# Patient Record
Sex: FEMALE | Race: BLACK OR AFRICAN AMERICAN | Employment: OTHER | RURAL
[De-identification: names, ages, dates, MRNs, and addresses within clinical notes are randomized per-mention and may not be internally consistent; named-entity substitution may affect disease eponyms.]

---

## 2017-02-02 ENCOUNTER — OFFICE VISIT (OUTPATIENT)
Dept: FAMILY MEDICINE CLINIC | Age: 67
End: 2017-02-02

## 2017-02-02 VITALS
OXYGEN SATURATION: 96 % | DIASTOLIC BLOOD PRESSURE: 68 MMHG | SYSTOLIC BLOOD PRESSURE: 151 MMHG | HEART RATE: 95 BPM | TEMPERATURE: 102.9 F | HEIGHT: 61 IN | RESPIRATION RATE: 16 BRPM | WEIGHT: 143 LBS | BODY MASS INDEX: 27 KG/M2

## 2017-02-02 DIAGNOSIS — R50.9 ACUTE FEBRILE ILLNESS: Primary | ICD-10-CM

## 2017-02-02 DIAGNOSIS — R52 BODY ACHES: ICD-10-CM

## 2017-02-02 DIAGNOSIS — I10 ESSENTIAL HYPERTENSION: ICD-10-CM

## 2017-02-02 DIAGNOSIS — G40.909 SEIZURE DISORDER (HCC): ICD-10-CM

## 2017-02-02 LAB
QUICKVUE INFLUENZA TEST: NEGATIVE
VALID INTERNAL CONTROL?: YES

## 2017-02-02 RX ORDER — OSELTAMIVIR PHOSPHATE 75 MG/1
75 CAPSULE ORAL 2 TIMES DAILY
Qty: 10 CAP | Refills: 0 | Status: SHIPPED | OUTPATIENT
Start: 2017-02-02 | End: 2017-02-07

## 2017-02-02 NOTE — PATIENT INSTRUCTIONS
Learning About Fever  What is a fever? A fever is a high body temperature. It's one way your body fights being sick. A fever shows that the body is responding to infection or other illnesses, both minor and severe. A fever is a symptom, not an illness by itself. A fever can be a sign that you are ill, but most fevers are not caused by a serious problem. You may have a fever with a minor illness, such as a cold. But sometimes a very serious infection may cause little or no fever. It is important to look at other symptoms, other conditions you have, and how you feel in general. In children, notice how they act and see what symptoms they complain of. What is a normal body temperature? A normal body temperature is about 98. 6ºF. Some people have a normal temperature that is a little higher or a little lower than this. Your temperature may be a little lower in the morning than it is later in the day. It may go up during hot weather or when you exercise, wear heavy clothes, or take a hot bath. Your temperature may also be different depending on how you take it. A temperature taken in the mouth (oral) or under the arm may be a little lower than your core temperature (rectal). What is a fever temperature? A core temperature of 100.4°F or above is considered a fever. What can cause a fever? A fever may be caused by:  · Infections. This is the most common cause of a fever. Examples of infections that can cause a fever include the flu, a kidney infection, or pneumonia. · Some medicines. · Severe trauma or injury, such as a heart attack, stroke, heatstroke, or burns. · Other medical conditions, such as arthritis and some cancers. How can you treat a fever at home? · Ask your doctor if you can take an over-the-counter pain medicine, such as acetaminophen (Tylenol), ibuprofen (Advil, Motrin), or naproxen (Aleve). Be safe with medicines. Read and follow all instructions on the label.   · To prevent dehydration, drink plenty of fluids. Choose water and other caffeine-free clear liquids until you feel better. If you have kidney, heart, or liver disease and have to limit fluids, talk with your doctor before you increase the amount of fluids you drink. Follow-up care is a key part of your treatment and safety. Be sure to make and go to all appointments, and call your doctor if you are having problems. It's also a good idea to know your test results and keep a list of the medicines you take. Where can you learn more? Go to http://caprice-carlos.info/. Enter P297 in the search box to learn more about \"Learning About Fever. \"  Current as of: May 27, 2016  Content Version: 11.1  © 7461-1704 [employer]. Care instructions adapted under license by [employer] (which disclaims liability or warranty for this information). If you have questions about a medical condition or this instruction, always ask your healthcare professional. [address] any warranty or liability for your use of this information.

## 2017-02-02 NOTE — PROGRESS NOTES
Patient: Nadia Gillette MRN: [id number]  SSN: [id number]    YOB: 1950  Age: 74 y.o. Sex: female      Chief Complaint   Patient presents with    Generalized Body Aches     started 1/31/2017, worse 2/1/2017    Fever     Nadia Gillette is a 74 y.o. female presents with complaints of myalgias, headache and fever for 2 days. There has been no nausea and no vomiting . she has not had  congestion and swollen glands. Symptoms are moderate. Patient is drinking plenty of fluids. There is not a hx of asthma. There is not a hx of allergic rhinitis. There is not a hx of tobacco use. There have not been contacts with similar infections. Patient with hx of HTN and seizure disorder    BP Readings from Last 3 Encounters:   02/02/17 151/68   11/02/16 174/80   05/26/16 139/63     Wt Readings from Last 3 Encounters:   02/02/17 143 lb (64.9 kg)   11/02/16 141 lb 12.8 oz (64.3 kg)   05/26/16 143 lb 9.6 oz (65.1 kg)     Body mass index is 27.02 kg/(m^2). Medications:     Current Outpatient Prescriptions   Medication Sig    oseltamivir (TAMIFLU) 75 mg capsule Take 1 Cap by mouth two (2) times a day for 5 days.  hydroCHLOROthiazide (HYDRODIURIL) 12.5 mg tablet TAKE ONE TABLET BY MOUTH ONCE DAILY    /HYPROMELLOSE/GLYCERIN (EYE DROP TEARS OP) Apply  to eye.  aspirin (ASPIRIN) 325 mg tablet Take 1 Tab by mouth daily.  phenytoin ER (DILANTIN ER) 100 mg ER capsule Take 100 mg by mouth daily. Take 3 capsules by mouth at bedtime     No current facility-administered medications for this visit. Problem List:     Patient Active Problem List    Diagnosis Date Noted    Essential hypertension 11/02/2016    Anemia 11/02/2016    Seizure disorder ([facility] [address] 06/21/2013       Medical History:     Past Medical History   Diagnosis Date    Essential hypertension 11/2/2016    Seizure ([facility] [address]     Seizures ([facility] [address]        Allergies:   No Known Allergies    Surgical History:   History reviewed.  No pertinent past surgical history. Social History:     Social History     Social History    Marital status:      Spouse name: N/A    Number of children: N/A    Years of education: N/A     Social History Main Topics    Smoking status: Never Smoker    Smokeless tobacco: Never Used    Alcohol use No    Drug use: No    Sexual activity: Yes     Partners: Male     Other Topics Concern    None     Social History Narrative       Review of Symptoms:  Constitutional: c/o malaise, fever   Skin: Negative for rash or lesion  Head: Negative for facial swelling or tenderness  Eyes: Negative for redness or discharge  Ears: Negative for otalgia or decreased hearing  Nose: c/o nasal congestion, sinus pressure  Neck: c/o sore throat, denies lymphadenopathy   Cardiovascular: Negative for chest pain or palpitations  Respiratory: c/o non-productive cough, denies wheezing or SOB  Gastrointestinal: Negative for nausea or abdominal pain  MS: c/o body aches  Neurologic: c/o headache, Negative for dizziness, paresthesia      Visit Vitals    /68    Pulse 95    Temp (!) 102.9 °F (39.4 °C) (Oral)    Resp 16    Ht 5' 1\" (1.549 m)    Wt 143 lb (64.9 kg)    SpO2 96%    BMI 27.02 kg/m2       Physical Examination:  General: Well developed, well nourished, appears fatigued  Skin: Warm and dry sans rash or lesion  Head: Normocephalic, atraumatic  Eyes: Sclera clear, EOMI  Nose: mucosal edema with rhinorrhea  Oropharynx: posterior erythema, no exudate   Neck: Normal range of motion, no lymphadenopathy  Cardiovascular: normal S1, S2, regular rate and rhythm  Respiratory: Clear to auscultation bilaterally with symmetrical, unlabored effort  Abdomen: Soft, Normal BS  Extremities: Full range of motion  Neurologic: Active, alert and oriented      Jonelle Webb was seen today for generalized body aches and fever.     Diagnoses and all orders for this visit:    Acute febrile illness  -     AMB POC RAPID INFLUENZA TEST  -     oseltamivir (TAMIFLU) 75 mg capsule; Take 1 Cap by mouth two (2) times a day for 5 days. -     CBC WITH AUTOMATED DIFF  -     INFLUENZA VIRUS A+B IGM AB    Body aches  -     AMB POC RAPID INFLUENZA TEST  -     CBC WITH AUTOMATED DIFF    Essential hypertension    Seizure disorder (HCC)      Alternate ibuprofen and acetaminophen to control fever  Symptomatic therapy suggested: rest, increase fluids, gargle prn for sore throat and call prn if symptoms persist or worsen. I have discussed the diagnosis with the patient and the intended plan as seen in the above orders. The patient expresses understanding and agreement with our plan of care. All of the patient's questions were answered to apparent satisfaction. The patient has received an after-visit summary. The patient knows to call our office if there are any questions or concerns regarding diagnosis and treatment plans. I have discussed medication side effects and warnings with the patient as well. Follow-up Disposition:  Return if symptoms worsen or fail to improve.

## 2017-02-02 NOTE — MR AVS SNAPSHOT
Visit Information Date & Time Provider Department Dept. Phone Encounter #  
 2/2/2017  1:40 PM Jorge Luis Haynes MD [address] [place] [address] [id number] Upcoming Health Maintenance Date Due DTaP/Tdap/Td series (1 - Tdap) 5/27/1969 GLAUCOMA SCREENING Q2Y 5/27/2013 INFLUENZA AGE 9 TO ADULT 8/1/2016 BREAST CANCER SCRN MAMMOGRAM 4/1/2017 Pneumococcal 65+ Low/Medium Risk (2 of 2 - PPSV23) 5/26/2017 MEDICARE YEARLY EXAM 5/27/2017 COLONOSCOPY 3/25/2025 Allergies as of 2/2/2017  Review Complete On: 2/2/2017 By: Kely Portillo LPN No Known Allergies Current Immunizations  Never Reviewed No immunizations on file. Not reviewed this visit You Were Diagnosed With   
  
 Codes Comments Acute febrile illness    -  Primary ICD-10-CM: R50.9 ICD-9-CM: 780.60 Body aches     ICD-10-CM: R52 ICD-9-CM: 780.96 Vitals BP Pulse Temp Resp Height(growth percentile) Weight(growth percentile) 151/68 95 (!) 102.9 °F (39.4 °C) (Oral) 16 5' 1\" (1.549 m) 143 lb (64.9 kg) SpO2 BMI OB Status Smoking Status 96% 27.02 kg/m2 Postmenopausal Never Smoker Vitals History BMI and BSA Data Body Mass Index Body Surface Area  
 27.02 kg/m 2 1.67 m 2 Preferred Pharmacy Pharmacy Name Phone [facility] [address] [address] [phone] Your Updated Medication List  
  
   
This list is accurate as of: 2/2/17  1:59 PM.  Always use your most recent med list.  
  
  
  
  
 aspirin 325 mg tablet Commonly known as:  ASPIRIN Take 1 Tab by mouth daily. EYE DROP TEARS OP Apply  to eye.  
  
 hydroCHLOROthiazide 12.5 mg tablet Commonly known as:  HYDRODIURIL  
TAKE ONE TABLET BY MOUTH ONCE DAILY  
  
 oseltamivir 75 mg capsule Commonly known as:  TAMIFLU Take 1 Cap by mouth two (2) times a day for 5 days. phenytoin  mg ER capsule Commonly known as:  DILANTIN ER Take 100 mg by mouth daily. Take 3 capsules by mouth at bedtime Prescriptions Sent to Pharmacy Refills  
 oseltamivir (TAMIFLU) 75 mg capsule 0 Sig: Take 1 Cap by mouth two (2) times a day for 5 days. Class: Normal  
 Pharmacy: [id number] #: [phone] Route: Oral  
  
We Performed the Following AMB POC RAPID INFLUENZA TEST [53956 CPT(R)] CBC WITH AUTOMATED DIFF [03068 CPT(R)] INFLUENZA VIRUS A+B IGM AB [51608 CPT(R)] Patient Instructions Learning About Fever What is a fever? A fever is a high body temperature. It's one way your body fights being sick. A fever shows that the body is responding to infection or other illnesses, both minor and severe. A fever is a symptom, not an illness by itself. A fever can be a sign that you are ill, but most fevers are not caused by a serious problem. You may have a fever with a minor illness, such as a cold. But sometimes a very serious infection may cause little or no fever. It is important to look at other symptoms, other conditions you have, and how you feel in general. In children, notice how they act and see what symptoms they complain of. What is a normal body temperature? A normal body temperature is about 98. 6ºF. Some people have a normal temperature that is a little higher or a little lower than this. Your temperature may be a little lower in the morning than it is later in the day. It may go up during hot weather or when you exercise, wear heavy clothes, or take a hot bath. Your temperature may also be different depending on how you take it. A temperature taken in the mouth (oral) or under the arm may be a little lower than your core temperature (rectal). What is a fever temperature? A core temperature of 100.4°F or above is considered a fever. What can cause a fever? A fever may be caused by: · Infections. This is the most common cause of a fever. Examples of infections that can cause a fever include the flu, a kidney infection, or pneumonia. · Some medicines. · Severe trauma or injury, such as a heart attack, stroke, heatstroke, or burns. · Other medical conditions, such as arthritis and some cancers. How can you treat a fever at home? · Ask your doctor if you can take an over-the-counter pain medicine, such as acetaminophen (Tylenol), ibuprofen (Advil, Motrin), or naproxen (Aleve). Be safe with medicines. Read and follow all instructions on the label. · To prevent dehydration, drink plenty of fluids. Choose water and other caffeine-free clear liquids until you feel better. If you have kidney, heart, or liver disease and have to limit fluids, talk with your doctor before you increase the amount of fluids you drink. Follow-up care is a key part of your treatment and safety. Be sure to make and go to all appointments, and call your doctor if you are having problems. It's also a good idea to know your test results and keep a list of the medicines you take. Where can you learn more? Go to http://caprice-carlos.info/. Enter R143 in the search box to learn more about \"Learning About Fever. \" Current as of: May 27, 2016 Content Version: 11.1 © 4997-9756 [employer]. Care instructions adapted under license by [employer] (which disclaims liability or warranty for this information). If you have questions about a medical condition or this instruction, always ask your healthcare professional. [place] [address] any warranty or liability for your use of this information. Introducing [facility]! Romayne Duster introduces [employer] patient portal. Now you can access parts of your medical record, email your doctor's office, and request medication refills online. 1. In your internet browser, go to [email or web address] 2. Click on the First Time User? Click Here link in the Sign In box. You will see the New Member Sign Up page. 3. Enter your Embrace+ Access Code exactly as it appears below. You will not need to use this code after youve completed the sign-up process. If you do not sign up before the expiration date, you must request a new code. · Embrace+ Access Code: [email or web address] Expires: 5/3/2017 12:32 PM 
 
4. Enter the last four digits of your Social Security Number (xxxx) and Date of Birth (mm/dd/yyyy) as indicated and click Submit. You will be taken to the next sign-up page. 5. Create a Embrace+ ID. This will be your Embrace+ login ID and cannot be changed, so think of one that is secure and easy to remember. 6. Create a Embrace+ password. You can change your password at any time. 7. Enter your Password Reset Question and Answer. This can be used at a later time if you forget your password. 8. Enter your e-mail address. You will receive e-mail notification when new information is available in [address] 9. Click Sign Up. You can now view and download portions of your medical record. 10. Click the Download Summary menu link to download a portable copy of your medical information. If you have questions, please visit the Frequently Asked Questions section of the Embrace+ website. Remember, Embrace+ is NOT to be used for urgent needs. For medical emergencies, dial 911. Now available from your iPhone and Android! Please provide this summary of care documentation to your next provider. Your primary care clinician is listed as Dai Montoya. If you have any questions after today's visit, please call [phone].

## 2017-02-02 NOTE — PROGRESS NOTES
Reviewed record in preparation for visit and have necessary documentation  Pt did not bring medication to office visit for review  Opportunity was given for questions  Goals that were addressed and/or need to be completed after this appointment include   Health Maintenance Due   Topic Date Due    DTaP/Tdap/Td series (1 - Tdap) 05/27/1969    GLAUCOMA SCREENING Q2Y  05/27/2013    INFLUENZA AGE 9 TO ADULT  08/01/2016    BREAST CANCER SCRN MAMMOGRAM  04/01/2017

## 2017-05-06 LAB
BASOPHILS # BLD AUTO: 0 X10E3/UL (ref 0–0.2)
BASOPHILS NFR BLD AUTO: 1 %
EOSINOPHIL # BLD AUTO: 0.1 X10E3/UL (ref 0–0.4)
EOSINOPHIL NFR BLD AUTO: 4 %
ERYTHROCYTE [DISTWIDTH] IN BLOOD BY AUTOMATED COUNT: 15.2 % (ref 12.3–15.4)
HCT VFR BLD AUTO: 35.7 % (ref 34–46.6)
HGB BLD-MCNC: 11.6 G/DL (ref 11.1–15.9)
IMM GRANULOCYTES # BLD: 0 X10E3/UL (ref 0–0.1)
IMM GRANULOCYTES NFR BLD: 0 %
LYMPHOCYTES # BLD AUTO: 1.1 X10E3/UL (ref 0.7–3.1)
LYMPHOCYTES NFR BLD AUTO: 33 %
MCH RBC QN AUTO: 29.4 PG (ref 26.6–33)
MCHC RBC AUTO-ENTMCNC: 32.5 G/DL (ref 31.5–35.7)
MCV RBC AUTO: 91 FL (ref 79–97)
MONOCYTES # BLD AUTO: 0.3 X10E3/UL (ref 0.1–0.9)
MONOCYTES NFR BLD AUTO: 10 %
NEUTROPHILS # BLD AUTO: 1.8 X10E3/UL (ref 1.4–7)
NEUTROPHILS NFR BLD AUTO: 52 %
PLATELET # BLD AUTO: 328 X10E3/UL (ref 150–379)
RBC # BLD AUTO: 3.94 X10E6/UL (ref 3.77–5.28)
WBC # BLD AUTO: 3.4 X10E3/UL (ref 3.4–10.8)

## 2017-06-09 DIAGNOSIS — I10 ESSENTIAL HYPERTENSION WITH GOAL BLOOD PRESSURE LESS THAN 130/80: ICD-10-CM

## 2017-06-11 RX ORDER — HYDROCHLOROTHIAZIDE 12.5 MG/1
TABLET ORAL
Qty: 90 TAB | Refills: 0 | Status: SHIPPED | OUTPATIENT
Start: 2017-06-11 | End: 2017-06-27 | Stop reason: SDUPTHER

## 2017-06-26 ENCOUNTER — TELEPHONE (OUTPATIENT)
Dept: FAMILY MEDICINE CLINIC | Age: 67
End: 2017-06-26

## 2017-06-26 NOTE — LETTER
NOTIFICATION RETURN TO WORK / SCHOOL 
 
6/26/2017 9:09 AM 
 
Ms. Ami Rdz [address] [id number] Here are your last lab results. Lab Results Component Value Date/Time WBC 3.4 05/05/2017 09:43 AM  
HGB 11.6 05/05/2017 09:43 AM  
HCT 35.7 05/05/2017 09:43 AM  
PLATELET 635 15/98/7760 09:43 AM  
MCV 91 05/05/2017 09:43 AM  
 
 
 
If there are questions or concerns please have the patient contact our office. Sincerely, Claudette Bellis, MD

## 2017-06-27 ENCOUNTER — OFFICE VISIT (OUTPATIENT)
Dept: FAMILY MEDICINE CLINIC | Age: 67
End: 2017-06-27

## 2017-06-27 VITALS
DIASTOLIC BLOOD PRESSURE: 67 MMHG | HEIGHT: 61 IN | OXYGEN SATURATION: 97 % | BODY MASS INDEX: 26.58 KG/M2 | SYSTOLIC BLOOD PRESSURE: 155 MMHG | HEART RATE: 69 BPM | RESPIRATION RATE: 20 BRPM | WEIGHT: 140.8 LBS | TEMPERATURE: 97.2 F

## 2017-06-27 DIAGNOSIS — I10 ESSENTIAL HYPERTENSION WITH GOAL BLOOD PRESSURE LESS THAN 130/80: Primary | ICD-10-CM

## 2017-06-27 DIAGNOSIS — Z00.00 ROUTINE GENERAL MEDICAL EXAMINATION AT A HEALTH CARE FACILITY: ICD-10-CM

## 2017-06-27 DIAGNOSIS — G40.909 SEIZURE DISORDER (HCC): ICD-10-CM

## 2017-06-27 DIAGNOSIS — Z13.39 SCREENING FOR ALCOHOLISM: ICD-10-CM

## 2017-06-27 DIAGNOSIS — Z13.31 SCREENING FOR DEPRESSION: ICD-10-CM

## 2017-06-27 DIAGNOSIS — Z12.31 ENCOUNTER FOR SCREENING MAMMOGRAM FOR BREAST CANCER: ICD-10-CM

## 2017-06-27 RX ORDER — PHENYTOIN SODIUM 100 MG/1
CAPSULE, EXTENDED RELEASE ORAL
Qty: 90 CAP | Refills: 5 | Status: SHIPPED | OUTPATIENT
Start: 2017-06-27 | End: 2018-04-23 | Stop reason: SDUPTHER

## 2017-06-27 RX ORDER — HYDROCHLOROTHIAZIDE 12.5 MG/1
TABLET ORAL
Qty: 90 TAB | Refills: 1 | Status: SHIPPED | OUTPATIENT
Start: 2017-06-27 | End: 2018-03-05 | Stop reason: SDUPTHER

## 2017-06-27 NOTE — MR AVS SNAPSHOT
Visit Information Date & Time Provider Department Dept. Phone Encounter #  
 6/27/2017  2:40 PM Bryan Kelsey MD Sonya Navas [id number] Upcoming Health Maintenance Date Due DTaP/Tdap/Td series (1 - Tdap) 5/27/1969 GLAUCOMA SCREENING Q2Y 5/27/2013 BREAST CANCER SCRN MAMMOGRAM 4/1/2017 Pneumococcal 65+ Low/Medium Risk (2 of 2 - PPSV23) 5/26/2017 MEDICARE YEARLY EXAM 5/27/2017 INFLUENZA AGE 9 TO ADULT 8/1/2017 COLONOSCOPY 3/25/2025 Allergies as of 6/27/2017  Review Complete On: 6/27/2017 By: Bryan Kelsey MD  
 No Known Allergies Current Immunizations  Never Reviewed No immunizations on file. Not reviewed this visit You Were Diagnosed With   
  
 Codes Comments Essential hypertension with goal blood pressure less than 130/80    -  Primary ICD-10-CM: I10 
ICD-9-CM: 401.9 [facility]     ICD-10-CM: G40.909 ICD-9-CM: 345.90 Encounter for screening mammogram for breast cancer     ICD-10-CM: Z12.31 
ICD-9-CM: V76.12 Vitals BP Pulse Temp Resp Height(growth percentile) Weight(growth percentile) 159/66 69 97.2 °F (36.2 °C) (Oral) 20 5' 1\" (1.549 m) 140 lb 12.8 oz (63.9 kg) SpO2 BMI OB Status Smoking Status 97% 26.6 kg/m2 Postmenopausal Never Smoker Vitals History BMI and BSA Data Body Mass Index Body Surface Area  
 26.6 kg/m 2 1.66 m 2 Preferred Pharmacy Pharmacy Name Phone [facility] [address] [address] [phone] Your Updated Medication List  
  
   
This list is accurate as of: 6/27/17  3:33 PM.  Always use your most recent med list.  
  
  
  
  
 aspirin 325 mg tablet Commonly known as:  ASPIRIN Take 1 Tab by mouth daily. EYE DROP TEARS OP Apply  to eye.  
  
 hydroCHLOROthiazide 12.5 mg tablet Commonly known as:  HYDRODIURIL  
TAKE ONE TABLET BY MOUTH ONCE DAILY phenytoin  mg ER capsule Commonly known as:  DILANTIN ER Take 3 capsules by mouth at bedtime Prescriptions Sent to Pharmacy Refills  
 hydroCHLOROthiazide (HYDRODIURIL) 12.5 mg tablet 1 Sig: TAKE ONE TABLET BY MOUTH ONCE DAILY Class: Normal  
 Pharmacy: [id number] [place] [address] Ph #: [phone]  
 phenytoin ER (DILANTIN ER) 100 mg ER capsule 5 Sig: Take 3 capsules by mouth at bedtime Class: Normal  
 Pharmacy: [id number] [place] [address] Ph #: [phone] To-Do List   
 06/27/2017 Imaging:  [facility] MAMMO BI SCREENING INCL CAD Patient Instructions DASH Diet: Care Instructions Your Care Instructions The DASH diet is an eating plan that can help lower your blood pressure. DASH stands for Dietary Approaches to Stop Hypertension. Hypertension is high blood pressure. The DASH diet focuses on eating foods that are high in calcium, potassium, and magnesium. These nutrients can lower blood pressure. The foods that are highest in these nutrients are fruits, vegetables, low-fat dairy products, nuts, seeds, and legumes. But taking calcium, potassium, and magnesium supplements instead of eating foods that are high in those nutrients does not have the same effect. The DASH diet also includes whole grains, fish, and poultry. The DASH diet is one of several lifestyle changes your doctor may recommend to lower your high blood pressure. Your doctor may also want you to decrease the amount of sodium in your diet. Lowering sodium while following the DASH diet can lower blood pressure even further than just the DASH diet alone. Follow-up care is a key part of your treatment and safety. Be sure to make and go to all appointments, and call your doctor if you are having problems. It's also a good idea to know your test results and keep a list of the medicines you take. How can you care for yourself at home? Following the DASH diet · Eat 4 to 5 servings of fruit each day. A serving is 1 medium-sized piece of fruit, ½ cup chopped or canned fruit, 1/4 cup dried fruit, or 4 ounces (½ cup) of fruit juice. Choose fruit more often than fruit juice. · Eat 4 to 5 servings of vegetables each day. A serving is 1 cup of lettuce or raw leafy vegetables, ½ cup of chopped or cooked vegetables, or 4 ounces (½ cup) of vegetable juice. Choose vegetables more often than vegetable juice. · Get 2 to 3 servings of low-fat and fat-free dairy each day. A serving is 8 ounces of milk, 1 cup of yogurt, or 1 ½ ounces of cheese. · Eat 6 to 8 servings of grains each day. A serving is 1 slice of bread, 1 ounce of dry cereal, or ½ cup of cooked rice, pasta, or cooked cereal. Try to choose whole-grain products as much as possible. · Limit lean meat, poultry, and fish to 2 servings each day. A serving is 3 ounces, about the size of a deck of cards. · Eat 4 to 5 servings of nuts, seeds, and legumes (cooked dried beans, lentils, and split peas) each week. A serving is 1/3 cup of nuts, 2 tablespoons of seeds, or ½ cup of cooked beans or peas. · Limit fats and oils to 2 to 3 servings each day. A serving is 1 teaspoon of vegetable oil or 2 tablespoons of salad dressing. · Limit sweets and added sugars to 5 servings or less a week. A serving is 1 tablespoon jelly or jam, ½ cup sorbet, or 1 cup of lemonade. · Eat less than 2,300 milligrams (mg) of sodium a day. If you limit your sodium to 1,500 mg a day, you can lower your blood pressure even more. Tips for success · Start small. Do not try to make dramatic changes to your diet all at once. You might feel that you are missing out on your favorite foods and then be more likely to not follow the plan. Make small changes, and stick with them. Once those changes become habit, add a few more changes. · Try some of the following: ¨ Make it a goal to eat a fruit or vegetable at every meal and at snacks. This will make it easy to get the recommended amount of fruits and vegetables each day. ¨ Try yogurt topped with fruit and nuts for a snack or healthy dessert. ¨ Add lettuce, tomato, cucumber, and onion to sandwiches. ¨ Combine a ready-made pizza crust with low-fat mozzarella cheese and lots of vegetable toppings. Try using tomatoes, squash, spinach, broccoli, carrots, cauliflower, and onions. ¨ Have a variety of cut-up vegetables with a low-fat dip as an appetizer instead of chips and dip. ¨ Sprinkle sunflower seeds or chopped almonds over salads. Or try adding chopped walnuts or almonds to cooked vegetables. ¨ Try some vegetarian meals using beans and peas. Add garbanzo or kidney beans to salads. Make burritos and tacos with mashed hatfield beans or black beans. Where can you learn more? Go to http://caprice-carlos.info/. Enter O467 in the search box to learn more about \"DASH Diet: Care Instructions. \" Current as of: April 3, 2017 Content Version: 11.3 © 0721-4576 [place], [place]. Care instructions adapted under license by [employer] (which disclaims liability or warranty for this information). If you have questions about a medical condition or this instruction, always ask your healthcare professional. [place] [address] any warranty or liability for your use of this information. Introducing [facility]! Kate Hernandez introduces [employer] patient portal. Now you can access parts of your medical record, email your doctor's office, and request medication refills online. 1. In your internet browser, go to [email or web address] 2. Click on the First Time User? Click Here link in the Sign In box. You will see the New Member Sign Up page. 3. Enter your [employer] Access Code exactly as it appears below.  You will not need to use this code after youve completed the sign-up process. If you do not sign up before the expiration date, you must request a new code. · [employer] Access Code: [email or web address] Expires: 9/25/2017  3:22 PM 
 
4. Enter the last four digits of your Social Security Number (xxxx) and Date of Birth (mm/dd/yyyy) as indicated and click Submit. You will be taken to the next sign-up page. 5. Create a [employer]ue[employer] ID. This will be your [employer] login ID and cannot be changed, so think of one that is secure and easy to remember. 6. Create a [employer] password. You can change your password at any time. 7. Enter your Password Reset Question and Answer. This can be used at a later time if you forget your password. 8. Enter your e-mail address. You will receive e-mail notification when new information is available in [address] 9. Click Sign Up. You can now view and download portions of your medical record. 10. Click the Download Summary menu link to download a portable copy of your medical information. If you have questions, please visit the Frequently Asked Questions section of the [employer] website. Remember, [employer] is NOT to be used for urgent needs. For medical emergencies, dial 911. Now available from your iPhone and Android! Please provide this summary of care documentation to your next provider. Your primary care clinician is listed as Tere Soto. If you have any questions after today's visit, please call [phone].

## 2017-06-27 NOTE — PROGRESS NOTES
Health Maintenance Due   Topic Date Due    DTaP/Tdap/Td series (1 - Tdap) 05/27/1969    GLAUCOMA SCREENING Q2Y  05/27/2013    BREAST CANCER SCRN MAMMOGRAM  04/01/2017    Pneumococcal 65+ Low/Medium Risk (2 of 2 - PPSV23) 05/26/2017    MEDICARE YEARLY EXAM  05/27/2017

## 2017-06-27 NOTE — PATIENT INSTRUCTIONS

## 2017-06-30 NOTE — PROGRESS NOTES
Chief Complaint   Patient presents with    Annual Wellness Visit    Results     she is a 69y.o. year old female who presents for follow up. Patient with hx of HTN, anemia and seizure disorder. Hypertension:  The patient reports:  taking medications as instructed, no medication side effects noted, no TIA's, no chest pain on exertion, no dyspnea on exertion, no swelling of ankles. BP Readings from Last 3 Encounters:   06/27/17 155/67   02/02/17 151/68   11/02/16 174/80     Patient advised to log blood pressures at home 3-5 times monthly and bring to next visit. Call office as soon as possible if BP's over 140/90 on multiple occasions or with symptoms of dizziness, chest pain, shortness of breath, headache or ankle swelling. Our goal is to normalize the blood pressure to decrease the risks of strokes and heart attacks. The patient is in agreement with the plan. Patient Active Problem List   Diagnosis Code    [facility] ([facility] G40.909    Essential hypertension I10    Anemia D64.9     History reviewed. No pertinent surgical history. Social History     Social History    Marital status:      Spouse name: N/A    Number of children: N/A    Years of education: N/A     Occupational History    Not on file. Social History Main Topics    Smoking status: Never Smoker    Smokeless tobacco: Never Used    Alcohol use No    Drug use: No    Sexual activity: Yes     Partners: Male     Other Topics Concern    Not on file     Social History Narrative     Family History   Problem Relation Age of Onset    Stroke Father     Heart Disease Sister      Current Outpatient Prescriptions   Medication Sig    hydroCHLOROthiazide (HYDRODIURIL) 12.5 mg tablet TAKE ONE TABLET BY MOUTH ONCE DAILY    phenytoin ER (DILANTIN ER) 100 mg ER capsule Take 3 capsules by mouth at bedtime    /HYPROMELLOSE/GLYCERIN (EYE DROP TEARS OP) Apply  to eye.     aspirin (ASPIRIN) 325 mg tablet Take 1 Tab by mouth daily. No current facility-administered medications for this visit. No Known Allergies    Review of Systems:  Constitutional: Negative for fatigue or malaise  Derm: Negative for rash or lesion  HEENT: Negative for acute hearing or vision changes  Cardiovascular: Negative for dizziness, chest pain or palpitations  Respiratory: Negative for cough, wheezing or SOB  Gastreintestinal: Negative for nausea or abdominal pain  Genital/urinary: Negative for dysuria or voiding dysfunction  Muscoloskeletal: Negative for acute myalgias or arthralgias   Neurological: Negative for headache, weakness or paresthesia  Psychological: Negative for depression or anxiety      Vitals:    06/27/17 1437 06/27/17 1536   BP: 159/66 155/67   Pulse: 69    Resp: 20    Temp: 97.2 °F (36.2 °C)    TempSrc: Oral    SpO2: 97%    Weight: 140 lb 12.8 oz (63.9 kg)    Height: 5' 1\" (1.549 m)      Physical Exam:  Constitutional: well developed, well nourished, in no acute distress  Skin: warm and dry, normal tone and turgor  Head: normocephalic, atraumatic  Eyes: sclera clear, EOMI, PERRL  Neck: normal range of motion  Cardiovascular: normal S1, S2, regular rate and rhythm  Respiratory: clear to auscultation bilaterally with symmetrical effort  Abdomen: soft, BS normal  Extremities: full range of motion  Neurology: normal strength and sensation  Psych: active, alert and oriented, affect appropriate     Kelvin Larios was seen today for annual wellness visit and results. Diagnoses and all orders for this visit:    Essential hypertension with goal blood pressure less than 130/80  -     hydroCHLOROthiazide (HYDRODIURIL) 12.5 mg tablet; TAKE ONE TABLET BY MOUTH ONCE DAILY    Seizure disorder (HCC)  -     phenytoin ER (DILANTIN ER) 100 mg ER capsule; Take 3 capsules by mouth at bedtime    Encounter for screening mammogram for breast cancer  -     [facility] MAMMO BI SCREENING INCL CAD;  Future      I have discussed the diagnosis with the patient and the intended plan as seen in the above orders. The patient expresses understanding and agreement with our plan of care. The patient has received an after-visit summary. The patient knows to call our office if there are any questions or concerns regarding diagnosis and treatment plans. I have discussed medication side effects and warnings with the patient as well. Follow-up Disposition:  Return in about 6 months (around 12/27/2017), or if symptoms worsen or fail to improve. The following Annual Medicare Wellness Exam is distinct and separate from the medical evaluation and decision making. This is a Subsequent Medicare Annual Wellness Visit providing Personalized Prevention Plan Services (PPPS) (Performed 12 months after initial AWV and PPPS )    I have reviewed the patient's medical history in detail and updated the computerized patient record. History     Past Medical History:   Diagnosis Date    Essential hypertension 11/2/2016    [facility]     Seizures ([facility]       History reviewed. No pertinent surgical history. Current Outpatient Prescriptions   Medication Sig Dispense Refill    hydroCHLOROthiazide (HYDRODIURIL) 12.5 mg tablet TAKE ONE TABLET BY MOUTH ONCE DAILY 90 Tab 1    phenytoin ER (DILANTIN ER) 100 mg ER capsule Take 3 capsules by mouth at bedtime 90 Cap 5    /HYPROMELLOSE/GLYCERIN (EYE DROP TEARS OP) Apply  to eye.  aspirin (ASPIRIN) 325 mg tablet Take 1 Tab by mouth daily.        No Known Allergies  Family History   Problem Relation Age of Onset    Stroke Father     Heart Disease Sister      Social History   Substance Use Topics    Smoking status: Never Smoker    Smokeless tobacco: Never Used    Alcohol use No     Patient Active Problem List   Diagnosis Code    [facility] G40.909    Essential hypertension I10    Anemia D64.9       Depression Risk Factor Screening:     PHQ over the last two weeks 5/26/2016   Little interest or pleasure in doing things Not at all   Feeling down, depressed or hopeless Not at all   Total Score PHQ 2 0     Alcohol Risk Factor Screening: On any occasion during the past 3 months, have you had more than 3 drinks containing alcohol? No    Do you average more than 7 drinks per week? No        Functional Ability and Level of Safety:     Hearing Loss   mild    Activities of Daily Living   Self-care. Requires assistance with: no ADLs    Fall Risk   Fall Risk Assessment, last 12 mths 5/26/2016   Able to walk? Yes   Fall in past 12 months? No     Abuse Screen   Patient is not abused      Evaluation of Cognitive Function:  Mood/affect:  neutral  Appearance: age appropriate and casually dressed  Family member/caregiver input: none    Patient Care Team:  Mamta Payne MD as PCP - General (Family Practice)  Isa Howell OD (Optometry)    Advice/Referrals/Counseling   Education and counseling provided:  Are appropriate based on today's review and evaluation      Assessment/Plan       ICD-10-CM ICD-9-CM    1. Routine general medical examination at a health care facility Z00.00 V70.0    2. Screening for alcoholism Z13.89 V79.1 [facility] ANNUAL ALCOHOL SCREEN 15 MIN   3. Screening for depression Z13.89 V79.0 [facility] DEPRESSION SCREEN ANNUAL   .

## 2017-07-25 ENCOUNTER — HOSPITAL ENCOUNTER (OUTPATIENT)
Dept: MAMMOGRAPHY | Age: 67
Discharge: HOME | End: 2017-07-25
Attending: FAMILY MEDICINE
Payer: MEDICARE

## 2017-07-25 DIAGNOSIS — Z12.31 ENCOUNTER FOR SCREENING MAMMOGRAM FOR BREAST CANCER: ICD-10-CM

## 2017-07-25 PROCEDURE — 77067 SCR MAMMO BI INCL CAD: CPT

## 2017-10-16 ENCOUNTER — OFFICE VISIT (OUTPATIENT)
Dept: FAMILY MEDICINE CLINIC | Age: 67
End: 2017-10-16

## 2017-10-16 VITALS
SYSTOLIC BLOOD PRESSURE: 178 MMHG | OXYGEN SATURATION: 97 % | TEMPERATURE: 97.9 F | RESPIRATION RATE: 18 BRPM | DIASTOLIC BLOOD PRESSURE: 70 MMHG | HEIGHT: 61 IN | HEART RATE: 80 BPM | BODY MASS INDEX: 25.68 KG/M2 | WEIGHT: 136 LBS

## 2017-10-16 DIAGNOSIS — G40.909 SEIZURE DISORDER (HCC): ICD-10-CM

## 2017-10-16 DIAGNOSIS — I10 ESSENTIAL HYPERTENSION: ICD-10-CM

## 2017-10-16 DIAGNOSIS — M25.552 LEFT HIP PAIN: Primary | ICD-10-CM

## 2017-10-16 RX ORDER — GABAPENTIN 100 MG/1
100 CAPSULE ORAL 3 TIMES DAILY
Qty: 90 CAP | Refills: 0 | Status: SHIPPED | OUTPATIENT
Start: 2017-10-16 | End: 2018-08-23

## 2017-10-16 RX ORDER — KETOROLAC TROMETHAMINE 30 MG/ML
30 INJECTION, SOLUTION INTRAMUSCULAR; INTRAVENOUS ONCE
Qty: 1 VIAL | Refills: 0
Start: 2017-10-16 | End: 2017-10-16

## 2017-10-16 NOTE — PATIENT INSTRUCTIONS

## 2017-10-16 NOTE — PROGRESS NOTES
Reviewed record in preparation for visit and have necessary documentation  Pt did not bring medication to office visit for review    Goals that were addressed and/or need to be completed during or after this appointment include   Health Maintenance Due   Topic Date Due    DTaP/Tdap/Td series (1 - Tdap) 05/27/1969    GLAUCOMA SCREENING Q2Y  05/27/2013    Pneumococcal 65+ Low/Medium Risk (2 of 2 - PPSV23) 05/26/2017    INFLUENZA AGE 9 TO ADULT  08/01/2017

## 2017-10-17 NOTE — PROGRESS NOTES
Patient: Erin Eldridge MRN: [id number]  SSN: [id number]    YOB: 1950  Age: 69 y.o. Sex: female      Chief Complaint   Patient presents with    Hip Pain     left    Back Pain     Erin Eldridge is a 69 y.o. female who presents with her sons with complains of left sided pain of hip/lower back for several  day(s), The pain is positional with movement, without radiation . Pain is improved with rest. Severity of pain is 8 out of 10.  numbness, tingling, weakness is not present  Precipitating factors: none recalled by the patient. Prior history of right sided hip/lower back problems several years ago. Patient with hx of HTN and HLD. She is due for lab work. BP Readings from Last 3 Encounters:   10/16/17 178/70   06/27/17 155/67   02/02/17 151/68     Wt Readings from Last 3 Encounters:   10/16/17 136 lb (61.7 kg)   06/27/17 140 lb 12.8 oz (63.9 kg)   02/02/17 143 lb (64.9 kg)     Body mass index is 25.7 kg/(m^2). Patient Active Problem List   Diagnosis Code    [facility] G40.909    Essential hypertension I10    Anemia D64.9     History reviewed. No pertinent surgical history. Social History     Social History    Marital status:      Spouse name: N/A    Number of children: N/A    Years of education: N/A     Occupational History    Not on file. Social History Main Topics    Smoking status: Never Smoker    Smokeless tobacco: Never Used    Alcohol use No    Drug use: No    Sexual activity: Yes     Partners: Male     Other Topics Concern    Not on file     Social History Narrative     Family History   Problem Relation Age of Onset    Stroke Father     Heart Disease Sister      Current Outpatient Prescriptions   Medication Sig    gabapentin (NEURONTIN) 100 mg capsule Take 1 Cap by mouth three (3) times daily.  ketorolac (TORADOL) 30 mg/mL (1 mL) injection 1 mL by IntraMUSCular route once for 1 dose.     hydroCHLOROthiazide (HYDRODIURIL) 12.5 mg tablet TAKE ONE TABLET BY MOUTH ONCE DAILY    phenytoin ER (DILANTIN ER) 100 mg ER capsule Take 3 capsules by mouth at bedtime    /HYPROMELLOSE/GLYCERIN (EYE DROP TEARS OP) Apply  to eye.  aspirin (ASPIRIN) 325 mg tablet Take 1 Tab by mouth daily. No current facility-administered medications for this visit. No Known Allergies    Review of Symptoms:  Constitutional: Negative for fever, chills, fatigue, malaise  Skin: Negative for rash or lesion  CV: Negative for chest pain or palpitations  Resp: Negative for cough, wheezing or SOB  Gastrointestinal: Negative for nausea or abdominal pain  Musculoskeletal: see HPI  Neurological: Negative for weakness or paresthesia         Vitals:    10/16/17 1505   BP: 178/70   Pulse: 80   Resp: 18   Temp: 97.9 °F (36.6 °C)   TempSrc: Oral   SpO2: 97%   Weight: 136 lb (61.7 kg)   Height: 5' 1\" (1.549 m)        Physical Examination:  General: Well developed, well nourished, appears uncomfortable  Skin: Warm and dry, no rash or lesion appreciated  Head: Normocephalic, atraumatic  Eyes: Sclera clear, EOMI  Neck: Normal range of motion  Respiratory: symmetrical, unlabored effort  Cardiovascular:  Regular rate and rhythm  Extremities: positive left straight leg raise  Neurological: Normal strength and sensation. No focal deficits  Psychological: Active, alert and oriented. Affect appropriate     X-Ray: deferred until follow up    Diagnoses and all orders for this visit:    1. Left hip pain  -     gabapentin (NEURONTIN) 100 mg capsule; Take 1 Cap by mouth three (3) times daily. -     ketorolac (TORADOL) 30 mg/mL (1 mL) injection; 1 mL by IntraMUSCular route once for 1 dose. -     KETOROLAC TROMETHAMINE INJ  -     WA THER/PROPH/DIAG INJECTION, SUBCUT/IM    2. Essential hypertension    3. Seizure disorder ([facility]         PLAN:  rest the injured area as much as practical, apply ice packs  Continue current prescribed medications for HTN and seizure disorder as written.    Discussed expected course, resolution and possible complications of diagnosis in detail with patient. Goals of treatment  were discussed. All of the patient's questions were addressed. The patient expresses understanding and agreement with our plan of care. The patient has received an after-visit summary and questions were answered concerning future plans. I have discussed medication side effects and warnings with the patient as well. Follow-up Disposition:  Return in about 2 weeks (around 10/30/2017), or if symptoms worsen or fail to improve.

## 2017-11-01 ENCOUNTER — OFFICE VISIT (OUTPATIENT)
Dept: FAMILY MEDICINE CLINIC | Age: 67
End: 2017-11-01

## 2017-11-01 VITALS
HEIGHT: 61 IN | HEART RATE: 82 BPM | WEIGHT: 138.6 LBS | RESPIRATION RATE: 20 BRPM | OXYGEN SATURATION: 98 % | DIASTOLIC BLOOD PRESSURE: 78 MMHG | SYSTOLIC BLOOD PRESSURE: 177 MMHG | TEMPERATURE: 99 F | BODY MASS INDEX: 26.17 KG/M2

## 2017-11-01 DIAGNOSIS — I10 ESSENTIAL HYPERTENSION: ICD-10-CM

## 2017-11-01 DIAGNOSIS — G89.29 CHRONIC LEFT-SIDED LOW BACK PAIN WITHOUT SCIATICA: Primary | ICD-10-CM

## 2017-11-01 DIAGNOSIS — M25.552 LEFT HIP PAIN: ICD-10-CM

## 2017-11-01 DIAGNOSIS — M54.50 CHRONIC LEFT-SIDED LOW BACK PAIN WITHOUT SCIATICA: Primary | ICD-10-CM

## 2017-11-01 DIAGNOSIS — E78.00 PURE HYPERCHOLESTEROLEMIA: ICD-10-CM

## 2017-11-01 PROBLEM — H25.13 AGE-RELATED NUCLEAR CATARACT OF BOTH EYES: Status: ACTIVE | Noted: 2017-06-26

## 2017-11-01 PROBLEM — H40.10X3: Status: ACTIVE | Noted: 2017-06-26

## 2017-11-01 RX ORDER — KETOROLAC TROMETHAMINE 30 MG/ML
30 INJECTION, SOLUTION INTRAMUSCULAR; INTRAVENOUS ONCE
Qty: 1 VIAL | Refills: 0
Start: 2017-11-01 | End: 2017-11-01

## 2017-11-01 NOTE — MR AVS SNAPSHOT
Visit Information Date & Time Provider Department Dept. Phone Encounter #  
 11/1/2017  9:20 AM Heather Coles MD [address] [place] [address] [id number] Upcoming Health Maintenance Date Due DTaP/Tdap/Td series (1 - Tdap) 5/27/1969 GLAUCOMA SCREENING Q2Y 5/27/2013 Pneumococcal 65+ Low/Medium Risk (2 of 2 - PPSV23) 5/26/2017 INFLUENZA AGE 9 TO ADULT 8/1/2017 MEDICARE YEARLY EXAM 6/28/2018 BREAST CANCER SCRN MAMMOGRAM 7/25/2019 COLONOSCOPY 3/25/2025 Allergies as of 11/1/2017  Review Complete On: 11/1/2017 By: Anitha Conde No Known Allergies Current Immunizations  Never Reviewed No immunizations on file. Not reviewed this visit You Were Diagnosed With   
  
 Codes Comments Left hip pain    -  Primary ICD-10-CM: X06.639 ICD-9-CM: 719.45 Acute bilateral low back pain without sciatica     ICD-10-CM: M54.5 ICD-9-CM: 724.2, 338.19 Essential hypertension     ICD-10-CM: I10 
ICD-9-CM: 401.9 Pure hypercholesterolemia     ICD-10-CM: E78.00 ICD-9-CM: 272.0 Vitals BP Pulse Temp Resp Height(growth percentile) Weight(growth percentile) 177/78 82 99 °F (37.2 °C) (Oral) 20 5' 1\" (1.549 m) 138 lb 9.6 oz (62.9 kg) SpO2 BMI OB Status Smoking Status 98% 26.19 kg/m2 Postmenopausal Never Smoker Vitals History BMI and BSA Data Body Mass Index Body Surface Area  
 26.19 kg/m 2 1.65 m 2 Preferred Pharmacy Pharmacy Name Phone [facility] [address] [facility] [address] [phone] Your Updated Medication List  
  
   
This list is accurate as of: 11/1/17 10:39 AM.  Always use your most recent med list.  
  
  
  
  
 aspirin 325 mg tablet Commonly known as:  ASPIRIN Take 1 Tab by mouth daily. Diphth, Pertus(Acell), Tetanus 2.5-8-5 Lf-mcg-Lf/0.5mL Syrg Commonly known as:  Tia Kin  
 0.5 mL by IntraMUSCular route once for 1 dose. EYE DROP TEARS OP Apply  to eye.  
  
 gabapentin 100 mg capsule Commonly known as:  NEURONTIN Take 1 Cap by mouth three (3) times daily. hydroCHLOROthiazide 12.5 mg tablet Commonly known as:  HYDRODIURIL  
TAKE ONE TABLET BY MOUTH ONCE DAILY  
  
 ketorolac 30 mg/mL (1 mL) injection Commonly known as:  TORADOL  
1 mL by IntraMUSCular route once for 1 dose. phenytoin  mg ER capsule Commonly known as:  DILANTIN ER Take 3 capsules by mouth at bedtime Prescriptions Printed Refills Agatha Velazquez,, Tetanus (BOOSTRIX TDAP) 2.5-8-5 Lf-mcg-Lf/0.5mL syrg 0 Si.5 mL by IntraMUSCular route once for 1 dose. Class: Print Route: IntraMUSCular We Performed the Following HGB & HCT [98597 CPT(R)] KETOROLAC TROMETHAMINE INJ [ [facility]] LIPID PANEL [91849 CPT(R)] METABOLIC PANEL, COMPREHENSIVE [62621 CPT(R)] KS THER/PROPH/DIAG INJECTION, SUBCUT/IM V864937 CPT(R)] TSH 3RD GENERATION [19172 CPT(R)] To-Do List   
 2017 Imaging:  XR HIP LT W OR WO PELV 2-3 VWS   
  
 2017 Imaging:  XR SPINE LUMB 2 OR 3 V Patient Instructions DASH Diet: Care Instructions Your Care Instructions The DASH diet is an eating plan that can help lower your blood pressure. DASH stands for Dietary Approaches to Stop Hypertension. Hypertension is high blood pressure. The DASH diet focuses on eating foods that are high in calcium, potassium, and magnesium. These nutrients can lower blood pressure. The foods that are highest in these nutrients are fruits, vegetables, low-fat dairy products, nuts, seeds, and legumes. But taking calcium, potassium, and magnesium supplements instead of eating foods that are high in those nutrients does not have the same effect. The DASH diet also includes whole grains, fish, and poultry. The DASH diet is one of several lifestyle changes your doctor may recommend to lower your high blood pressure. Your doctor may also want you to decrease the amount of sodium in your diet. Lowering sodium while following the DASH diet can lower blood pressure even further than just the DASH diet alone. Follow-up care is a key part of your treatment and safety. Be sure to make and go to all appointments, and call your doctor if you are having problems. It's also a good idea to know your test results and keep a list of the medicines you take. How can you care for yourself at home? Following the DASH diet · Eat 4 to 5 servings of fruit each day. A serving is 1 medium-sized piece of fruit, ½ cup chopped or canned fruit, 1/4 cup dried fruit, or 4 ounces (½ cup) of fruit juice. Choose fruit more often than fruit juice. · Eat 4 to 5 servings of vegetables each day. A serving is 1 cup of lettuce or raw leafy vegetables, ½ cup of chopped or cooked vegetables, or 4 ounces (½ cup) of vegetable juice. Choose vegetables more often than vegetable juice. · Get 2 to 3 servings of low-fat and fat-free dairy each day. A serving is 8 ounces of milk, 1 cup of yogurt, or 1 ½ ounces of cheese. · Eat 6 to 8 servings of grains each day. A serving is 1 slice of bread, 1 ounce of dry cereal, or ½ cup of cooked rice, pasta, or cooked cereal. Try to choose whole-grain products as much as possible. · Limit lean meat, poultry, and fish to 2 servings each day. A serving is 3 ounces, about the size of a deck of cards. · Eat 4 to 5 servings of nuts, seeds, and legumes (cooked dried beans, lentils, and split peas) each week. A serving is 1/3 cup of nuts, 2 tablespoons of seeds, or ½ cup of cooked beans or peas. · Limit fats and oils to 2 to 3 servings each day. A serving is 1 teaspoon of vegetable oil or 2 tablespoons of salad dressing. · Limit sweets and added sugars to 5 servings or less a week.  A serving is 1 tablespoon jelly or jam, ½ cup sorbet, or 1 cup of lemonade. · Eat less than 2,300 milligrams (mg) of sodium a day. If you limit your sodium to 1,500 mg a day, you can lower your blood pressure even more. Tips for success · Start small. Do not try to make dramatic changes to your diet all at once. You might feel that you are missing out on your favorite foods and then be more likely to not follow the plan. Make small changes, and stick with them. Once those changes become habit, add a few more changes. · Try some of the following: ¨ Make it a goal to eat a fruit or vegetable at every meal and at snacks. This will make it easy to get the recommended amount of fruits and vegetables each day. ¨ Try yogurt topped with fruit and nuts for a snack or healthy dessert. ¨ Add lettuce, tomato, cucumber, and onion to sandwiches. ¨ Combine a ready-made pizza crust with low-fat mozzarella cheese and lots of vegetable toppings. Try using tomatoes, squash, spinach, broccoli, carrots, cauliflower, and onions. ¨ Have a variety of cut-up vegetables with a low-fat dip as an appetizer instead of chips and dip. ¨ Sprinkle sunflower seeds or chopped almonds over salads. Or try adding chopped walnuts or almonds to cooked vegetables. ¨ Try some vegetarian meals using beans and peas. Add garbanzo or kidney beans to salads. Make burritos and tacos with mashed hatfield beans or black beans. Where can you learn more? Go to http://caprice-carlos.info/. Enter S256 in the search box to learn more about \"DASH Diet: Care Instructions. \" Current as of: September 21, 2016 Content Version: 11.4 © 7491-4266 [employer]. Care instructions adapted under license by [employer] (which disclaims liability or warranty for this information).  If you have questions about a medical condition or this instruction, always ask your healthcare professional. [employer] disclaims any warranty or liability for your use of this information. Introducing [facility] HEALTH SERVICES! Yeison Phipps introduces [facility] patient portal. Now you can access parts of your medical record, email your doctor's office, and request medication refills online. 1. In your internet browser, go to [email or web address] 2. Click on the First Time User? Click Here link in the Sign In box. You will see the New Member Sign Up page. 3. Enter your [facility] Access Code exactly as it appears below. You will not need to use this code after youve completed the sign-up process. If you do not sign up before the expiration date, you must request a new code. · [facility] Access Code: [email or web address] Expires: 1/14/2018  3:57 PM 
 
4. Enter the last four digits of your Social Security Number (xxxx) and Date of Birth (mm/dd/yyyy) as indicated and click Submit. You will be taken to the next sign-up page. 5. Create a [facility] ID. This will be your [facility] login ID and cannot be changed, so think of one that is secure and easy to remember. 6. Create a [facility] password. You can change your password at any time. 7. Enter your Password Reset Question and Answer. This can be used at a later time if you forget your password. 8. Enter your e-mail address. You will receive e-mail notification when new information is available in [address] 9. Click Sign Up. You can now view and download portions of your medical record. 10. Click the Download Summary menu link to download a portable copy of your medical information. If you have questions, please visit the Frequently Asked Questions section of the [facility] website. Remember, [facility] is NOT to be used for urgent needs. For medical emergencies, dial 911. Now available from your iPhone and Android! Please provide this summary of care documentation to your next provider. Your primary care clinician is listed as Leo Donaldson. If you have any questions after today's visit, please call [phone].

## 2017-11-01 NOTE — PROGRESS NOTES
Health Maintenance Due   Topic Date Due    DTaP/Tdap/Td series (1 - Tdap) 05/27/1969    GLAUCOMA SCREENING Q2Y  05/27/2013    Pneumococcal 65+ Low/Medium Risk (2 of 2 - PPSV23) 05/26/2017    INFLUENZA AGE 9 TO ADULT  08/01/2017

## 2017-11-01 NOTE — PATIENT INSTRUCTIONS

## 2017-11-01 NOTE — PROGRESS NOTES
Patient: Sindhu Santana MRN: [id number]  SSN: [id number]    YOB: 1950  Age: 69 y.o. Sex: female      Chief Complaint   Patient presents with    Hip Pain    Leg Pain     Sindhu Santana is a 69 y.o. female who presents with her  and daughter for follow up left sided pain of hip/lower back. Seen in office for same 2 weeks ago. She asks for another ketorolac injection. Says she had some short term relief from prior injection. The pain is positional with movement, without radiation . Pain is improved with rest. Severity of pain is 8 out of 10 at its worse. Numbness, tingling, weakness is not present  Precipitating factors: none recalled by the patient. Prior history of right sided hip/lower back problems several years ago which resolved with medical management. Lumbar xray shows significant degenerative changes. Informed patient of need for ortho consult. Patient with hx of HTN and HLD. BP uncontrolled since onset of LBP. Patient denies HA, dizziness, SOB, CP, abdominal pain, dysuria. BP Readings from Last 3 Encounters:   11/01/17 177/78   10/16/17 178/70   06/27/17 155/67     Wt Readings from Last 3 Encounters:   11/01/17 138 lb 9.6 oz (62.9 kg)   10/16/17 136 lb (61.7 kg)   06/27/17 140 lb 12.8 oz (63.9 kg)     Body mass index is 26.19 kg/(m^2). Patient Active Problem List   Diagnosis Code    [facility] G[facility]    Essential hypertension I10    Anemia D64.9    Primary open angle glaucoma H40.1190    Glaucoma, open angle, severe stage H40.10X3    Age-related nuclear cataract of both eyes H25.13     History reviewed. No pertinent surgical history. Social History     Social History    Marital status:      Spouse name: N/A    Number of children: N/A    Years of education: N/A     Occupational History    Not on file.      Social History Main Topics    Smoking status: Never Smoker    Smokeless tobacco: Never Used    Alcohol use No    Drug use: No    Sexual activity: Yes     Partners: Male     Other Topics Concern    Not on file     Social History Narrative     Family History   Problem Relation Age of Onset    Stroke Father     Heart Disease Sister      Current Outpatient Prescriptions   Medication Sig    Diphth, Pertus,Acell,, Tetanus (BOOSTRIX TDAP) 2.5-8-5 Lf-mcg-Lf/0.5mL syrg 0.5 mL by IntraMUSCular route once for 1 dose.  ketorolac (TORADOL) 30 mg/mL (1 mL) injection 1 mL by IntraMUSCular route once for 1 dose.  gabapentin (NEURONTIN) 100 mg capsule Take 1 Cap by mouth three (3) times daily.  hydroCHLOROthiazide (HYDRODIURIL) 12.5 mg tablet TAKE ONE TABLET BY MOUTH ONCE DAILY    phenytoin ER (DILANTIN ER) 100 mg ER capsule Take 3 capsules by mouth at bedtime    /HYPROMELLOSE/GLYCERIN (EYE DROP TEARS OP) Apply  to eye.  aspirin (ASPIRIN) 325 mg tablet Take 1 Tab by mouth daily. No current facility-administered medications for this visit. No Known Allergies    Review of Symptoms:  Constitutional: Negative for fever, chills, fatigue, malaise  Skin: Negative for rash or lesion  CV: Negative for chest pain or palpitations  Resp: Negative for cough, wheezing or SOB  Gastrointestinal: Negative for nausea or abdominal pain  Musculoskeletal: see HPI  Neurological: Negative for weakness or paresthesia         Vitals:    11/01/17 0943   BP: 177/78   Pulse: 82   Resp: 20   Temp: 99 °F (37.2 °C)   TempSrc: Oral   SpO2: 98%   Weight: 138 lb 9.6 oz (62.9 kg)   Height: 5' 1\" (1.549 m)        Physical Examination:  General: Well developed, well nourished, appears uncomfortable  Skin: Warm and dry, no rash or lesion appreciated  Head: Normocephalic, atraumatic  Eyes: Sclera clear, EOMI  Neck: Normal range of motion  Respiratory: symmetrical, unlabored effort  Cardiovascular:  Regular rate and rhythm  Extremities: positive left straight leg raise, left lumbar TTP  Neurological: Normal strength and sensation.  No focal deficits  Psychological: Active, alert and oriented. Affect appropriate     X-Ray: There is a significant dextroscoliosis of the thoracolumbar spine with  disc height loss and spurring at all lumbar disc levels, severe at L1-2 and  L2-3. Bone mineral density is decreased. Diagnoses and all orders for this visit:    1. Chronic left-sided low back pain without sciatica  -     XR SPINE LUMB 2 OR 3 V; Future  -     ketorolac (TORADOL) 30 mg/mL (1 mL) injection; 1 mL by IntraMUSCular route once for 1 dose. -     KETOROLAC TROMETHAMINE INJ  -     CA THER/PROPH/DIAG INJECTION, SUBCUT/IM  -     REFERRAL TO ORTHOPEDICS    2. Left hip pain  -     XR HIP LT W OR WO PELV 2-3 VWS; Future  -     ketorolac (TORADOL) 30 mg/mL (1 mL) injection; 1 mL by IntraMUSCular route once for 1 dose. -     KETOROLAC TROMETHAMINE INJ  -     CA THER/PROPH/DIAG INJECTION, SUBCUT/IM    3. Essential hypertension  -     METABOLIC PANEL, COMPREHENSIVE  -     TSH 3RD GENERATION  -     HGB & HCT    4. Pure hypercholesterolemia  -     METABOLIC PANEL, COMPREHENSIVE  -     LIPID PANEL    Other orders  -     Diphth, Pertus,Acell,, Tetanus (BOOSTRIX TDAP) 2.5-8-5 Lf-mcg-Lf/0.5mL syrg; 0.5 mL by IntraMUSCular route once for 1 dose. PLAN:  rest the injured area as much as practical, apply ice packs  Continue current prescribed medications for HTN and seizure disorder as written. Discussed expected course, resolution and possible complications of diagnosis in detail with patient. Goals of treatment  were discussed. All of the patient's questions were addressed. The patient expresses understanding and agreement with our plan of care. The patient has received an after-visit summary and questions were answered concerning future plans. I have discussed medication side effects and warnings with the patient as well. Follow-up Disposition:  Return in about 4 weeks (around 11/29/2017), or if symptoms worsen or fail to improve.

## 2017-11-02 LAB
ALBUMIN SERPL-MCNC: 3.9 G/DL (ref 3.6–4.8)
ALBUMIN/GLOB SERPL: 1.1 {RATIO} (ref 1.2–2.2)
ALP SERPL-CCNC: 80 IU/L (ref 39–117)
ALT SERPL-CCNC: 17 IU/L (ref 0–32)
AST SERPL-CCNC: 16 IU/L (ref 0–40)
BILIRUB SERPL-MCNC: 0.2 MG/DL (ref 0–1.2)
BUN SERPL-MCNC: 12 MG/DL (ref 8–27)
BUN/CREAT SERPL: 18 (ref 12–28)
CALCIUM SERPL-MCNC: 9.5 MG/DL (ref 8.7–10.3)
CHLORIDE SERPL-SCNC: 96 MMOL/L (ref 96–106)
CHOLEST SERPL-MCNC: 228 MG/DL (ref 100–199)
CO2 SERPL-SCNC: 29 MMOL/L (ref 18–29)
CREAT SERPL-MCNC: 0.66 MG/DL (ref 0.57–1)
GFR SERPLBLD CREATININE-BSD FMLA CKD-EPI: 104 ML/MIN/1.73
GFR SERPLBLD CREATININE-BSD FMLA CKD-EPI: 90 ML/MIN/1.73
GLOBULIN SER CALC-MCNC: 3.4 G/DL (ref 1.5–4.5)
GLUCOSE SERPL-MCNC: 87 MG/DL (ref 65–99)
HCT VFR BLD AUTO: 31.7 % (ref 34–46.6)
HDLC SERPL-MCNC: 124 MG/DL
HGB BLD-MCNC: 10.7 G/DL (ref 11.1–15.9)
LDLC SERPL CALC-MCNC: 93 MG/DL (ref 0–99)
POTASSIUM SERPL-SCNC: 4 MMOL/L (ref 3.5–5.2)
PROT SERPL-MCNC: 7.3 G/DL (ref 6–8.5)
SODIUM SERPL-SCNC: 140 MMOL/L (ref 134–144)
TRIGL SERPL-MCNC: 55 MG/DL (ref 0–149)
TSH SERPL DL<=0.005 MIU/L-ACNC: 0.95 UIU/ML (ref 0.45–4.5)
VLDLC SERPL CALC-MCNC: 11 MG/DL (ref 5–40)

## 2018-04-23 DIAGNOSIS — G40.909 SEIZURE DISORDER (HCC): ICD-10-CM

## 2018-04-25 RX ORDER — PHENYTOIN SODIUM 100 MG/1
CAPSULE, EXTENDED RELEASE ORAL
Qty: 90 CAP | Refills: 1 | Status: SHIPPED | OUTPATIENT
Start: 2018-04-25 | End: 2018-07-22 | Stop reason: SDUPTHER

## 2018-05-04 ENCOUNTER — OFFICE VISIT (OUTPATIENT)
Dept: FAMILY MEDICINE CLINIC | Age: 68
End: 2018-05-04

## 2018-05-04 VITALS
SYSTOLIC BLOOD PRESSURE: 145 MMHG | DIASTOLIC BLOOD PRESSURE: 71 MMHG | RESPIRATION RATE: 20 BRPM | HEART RATE: 63 BPM | TEMPERATURE: 97.4 F | BODY MASS INDEX: 26.24 KG/M2 | WEIGHT: 139 LBS | OXYGEN SATURATION: 97 % | HEIGHT: 61 IN

## 2018-05-04 DIAGNOSIS — D64.9 ANEMIA, UNSPECIFIED TYPE: ICD-10-CM

## 2018-05-04 DIAGNOSIS — G40.909 SEIZURE DISORDER (HCC): ICD-10-CM

## 2018-05-04 DIAGNOSIS — I10 ESSENTIAL HYPERTENSION: Primary | ICD-10-CM

## 2018-05-04 NOTE — MR AVS SNAPSHOT
[address] [address] 
[phone] Patient: Bijal Aly MRN: [id number] MQM:[id number]/[id number] Visit Information Date & Time Provider Department Dept. Phone Encounter #  
 5/4/2018  8:00 AM Pro Prajapatimileyliane Alas [address] [place] [address] [id number] Follow-up Instructions Return in about 6 months (around 11/4/2018). Upcoming Health Maintenance Date Due DTaP/Tdap/Td series (1 - Tdap) 5/27/1969 GLAUCOMA SCREENING Q2Y 5/27/2013 Pneumococcal 65+ Low/Medium Risk (2 of 2 - PPSV23) 5/26/2017 MEDICARE YEARLY EXAM 6/28/2018 Influenza Age 5 to Adult 8/1/2018 BREAST CANCER SCRN MAMMOGRAM 7/25/2019 COLONOSCOPY 3/25/2025 Allergies as of 5/4/2018  Review Complete On: 5/4/2018 By: José Duncan MD  
 No Known Allergies Current Immunizations  Never Reviewed No immunizations on file. Not reviewed this visit You Were Diagnosed With   
  
 Codes Comments Essential hypertension    -  Primary ICD-10-CM: I10 
ICD-9-CM: 401.9 Anemia, unspecified type     ICD-10-CM: D64.9 ICD-9-CM: 285.9 Seizure disorder ([facility]     ICD-10-CM: G40.909 ICD-9-CM: 345.90 Vitals BP Pulse Temp Resp Height(growth percentile) Weight(growth percentile) 145/71 63 97.4 °F (36.3 °C) (Oral) 20 5' 1\" (1.549 m) 139 lb (63 kg) SpO2 BMI OB Status Smoking Status 97% 26.26 kg/m2 Postmenopausal Never Smoker Vitals History BMI and BSA Data Body Mass Index Body Surface Area  
 26.26 kg/m 2 1.65 m 2 Preferred Pharmacy Pharmacy Name Phone [address] [address] [phone] Your Updated Medication List  
  
   
This list is accurate as of 5/4/18  8:43 AM.  Always use your most recent med list.  
  
  
  
  
 aspirin 325 mg tablet Commonly known as:  ASPIRIN Take 1 Tab by mouth daily. EYE DROP TEARS OP Apply  to eye.  
  
 gabapentin 100 mg capsule Commonly known as:  NEURONTIN Take 1 Cap by mouth three (3) times daily. hydroCHLOROthiazide 12.5 mg tablet Commonly known as:  HYDRODIURIL  
TAKE ONE TABLET BY MOUTH ONCE DAILY phenytoin  mg ER capsule Commonly known as:  DILANTIN ER  
TAKE THREE CAPSULES BY MOUTH AT BEDTIME We Performed the Following CBC W/O DIFF [50342 CPT(R)] LIPID PANEL [01625 CPT(R)] MAGNESIUM [id number] CPT(R)] METABOLIC PANEL, COMPREHENSIVE [43716 CPT(R)] PHENYTOIN [id number] CPT(R)] Follow-up Instructions Return in about 6 months (around 11/4/2018). Introducing [facility] & HE[facility]! [facility] introduces [employer] patient portal. Now you can access parts of your medical record, email your doctor's office, and request medication refills online. 1. In your internet browser, go to [email or web address] Access 2. Click on the First Time User? Click Here link in the Sign In box. You will see the New Member Sign Up page. 3. Enter your [employer] Access Code exactly as it appears below. You will not need to use this code after youve completed the sign-up process. If you do not sign up before the expiration date, you must request a new code. · [employer] Access Code: [email or web address] Expires: 8/2/2018  7:56 AM 
 
4. Enter the last four digits of your Social Security Number (xxxx) and Date of Birth (mm/dd/yyyy) as indicated and click Submit. You will be taken to the next sign-up page. 5. Create a [employer] ID. This will be your [employer] login ID and cannot be changed, so think of one that is secure and easy to remember. 6. Create a [employer] password. You can change your password at any time. 7. Enter your Password Reset Question and Answer. This can be used at a later time if you forget your password. 8. Enter your e-mail address.  You will receive e-mail notification when new information is available in [employer]. 9. Click Sign Up. You can now view and download portions of your medical record. 10. Click the Download Summary menu link to download a portable copy of your medical information. If you have questions, please visit the Frequently Asked Questions section of the [employer] website. Remember, [employer] is NOT to be used for urgent needs. For medical emergencies, dial 911. Now available from your iPhone and Android! Please provide this summary of care documentation to your next provider. Your primary care clinician is listed as Tristan Serna. If you have any questions after today's visit, please call [phone].

## 2018-05-04 NOTE — PROGRESS NOTES
Health Maintenance Due   Topic Date Due    DTaP/Tdap/Td series (1 - Tdap) 05/27/1969    GLAUCOMA SCREENING Q2Y  05/27/2013    Pneumococcal 65+ Low/Medium Risk (2 of 2 - PPSV23) 05/26/2017     Body mass index is 26.26 kg/(m^2). 1. Have you been to the ER, urgent care clinic since your last visit? Hospitalized since your last visit? No    2. Have you seen or consulted any other health care providers outside of the [address] [place] [address] since your last visit? Include any pap smears or colon screening.  No  Reviewed record in preparation for visit and have necessary documentation  Pt did not bring medication to office visit for review  Information was given to pt on Advanced Directives, Living Will  Information was given on Shingles Vaccine  opportunity was given for questions  Goals that were addressed and/or need to be completed during or after this appointment include

## 2018-05-14 NOTE — PROGRESS NOTES
Chief Complaint   Patient presents with    Hypertension    Labs     she is a 69y.o. year old female who presents for follow up. Patient with hx of HTN, anemia and seizure disorder. Hypertension:  The patient reports:  taking medications as instructed, no medication side effects noted, no TIA's, no chest pain on exertion, no dyspnea on exertion, no swelling of ankles. BP Readings from Last 3 Encounters:   05/04/18 145/71   11/01/17 177/78   10/16/17 178/70       Patient advised to log blood pressures at home 3-5 times monthly and bring to next visit. Call office as soon as possible if BP's over 140/90 on multiple occasions or with symptoms of dizziness, chest pain, shortness of breath, headache or ankle swelling. Our goal is to normalize the blood pressure to decrease the risks of strokes and heart attacks. The patient is in agreement with the plan. Patient Active Problem List   Diagnosis Code    [facility] G40.909    Essential hypertension I10    Anemia D64.9    Primary open angle glaucoma H40.1190    Glaucoma, open angle, severe stage H40.10X3    Age-related nuclear cataract of both eyes H25.13     History reviewed. No pertinent surgical history. Social History     Social History    Marital status:      Spouse name: N/A    Number of children: N/A    Years of education: N/A     Occupational History    Not on file.      Social History Main Topics    Smoking status: Never Smoker    Smokeless tobacco: Never Used    Alcohol use No    Drug use: No    Sexual activity: Yes     Partners: Male     Other Topics Concern    Not on file     Social History Narrative     Family History   Problem Relation Age of Onset    Stroke Father     Heart Disease Sister      Current Outpatient Prescriptions   Medication Sig    phenytoin ER (DILANTIN ER) 100 mg ER capsule TAKE THREE CAPSULES BY MOUTH AT BEDTIME    hydroCHLOROthiazide (HYDRODIURIL) 12.5 mg tablet TAKE ONE TABLET BY MOUTH ONCE DAILY    gabapentin (NEURONTIN) 100 mg capsule Take 1 Cap by mouth three (3) times daily.  /HYPROMELLOSE/GLYCERIN (EYE DROP TEARS OP) Apply  to eye.  aspirin (ASPIRIN) 325 mg tablet Take 1 Tab by mouth daily. No current facility-administered medications for this visit. No Known Allergies    Review of Systems:  Constitutional: Negative for fatigue or malaise  Derm: Negative for rash or lesion  HEENT: Negative for acute hearing or vision changes  Cardiovascular: Negative for dizziness, chest pain or palpitations  Respiratory: Negative for cough, wheezing or SOB  Gastreintestinal: Negative for nausea or abdominal pain  Genital/urinary: Negative for dysuria or voiding dysfunction  Muscoloskeletal: Negative for acute myalgias or arthralgias   Neurological: see HPI, Negative for headache, weakness or paresthesia  Psychological: Negative for depression or anxiety      Vitals:    05/04/18 0806 05/04/18 0818   BP: 167/70 145/71   Pulse: 66 63   Resp: 20    Temp: 97.4 °F (36.3 °C)    TempSrc: Oral    SpO2: 97%    Weight: 139 lb (63 kg)    Height: 5' 1\" (1.549 m)      Physical Exam:  Constitutional: well developed, well nourished, in no acute distress  Skin: warm and dry, normal tone and turgor  Head: normocephalic, atraumatic  Eyes: sclera clear, EOMI, PERRL  Neck: normal range of motion  Cardiovascular: normal S1, S2, regular rate and rhythm  Respiratory: clear to auscultation bilaterally with symmetrical effort  Abdomen: soft, BS normal  Extremities: full range of motion  Neurology: normal strength and sensation  Psych: active, alert and oriented, affect appropriate     Clarence Boudreaux was seen today for annual wellness visit and results. Diagnoses and all orders for this visit:    1. Essential hypertension  -     LIPID PANEL  -     METABOLIC PANEL, COMPREHENSIVE  -     MAGNESIUM    2.  Anemia, unspecified type  -     CBC W/O DIFF    3. Seizure disorder ([facility]  -     PHENYTOIN          I have discussed the diagnosis with the patient and the intended plan as seen in the above orders. The patient expresses understanding and agreement with our plan of care. The patient has received an after-visit summary. The patient knows to call our office if there are any questions or concerns regarding diagnosis and treatment plans. I have discussed medication side effects and warnings with the patient as well. Follow-up Disposition:  Return in about 6 months (around 11/4/2018).

## 2018-05-14 NOTE — PATIENT INSTRUCTIONS
Anemia: Care Instructions  Your Care Instructions    Anemia is a low level of red blood cells, which carry oxygen throughout your body. Many things can cause anemia. Lack of iron is one of the most common causes. Your body needs iron to make hemoglobin, a substance in red blood cells that carries oxygen from the lungs to your body's cells. Without enough iron, the body produces fewer and smaller red blood cells. As a result, your body's cells do not get enough oxygen, and you feel tired and weak. And you may have trouble concentrating. Bleeding is the most common cause of a lack of iron. You may have heavy menstrual bleeding or bleeding caused by conditions such as ulcers, hemorrhoids, or cancer. Regular use of aspirin or other anti-inflammatory medicines (such as ibuprofen) also can cause bleeding in some people. A lack of iron in your diet also can cause anemia, especially at times when the body needs more iron, such as during pregnancy, infancy, and the teen years. Your doctor may have prescribed iron pills. It may take several months of treatment for your iron levels to return to normal. Your doctor also may suggest that you eat foods that are rich in iron, such as meat and beans. There are many other causes of anemia. It is not always due to a lack of iron. Finding the specific cause of your anemia will help your doctor find the right treatment for you. Follow-up care is a key part of your treatment and safety. Be sure to make and go to all appointments, and call your doctor if you are having problems. It's also a good idea to know your test results and keep a list of the medicines you take. How can you care for yourself at home? · Take your medicines exactly as prescribed. Call your doctor if you think you are having a problem with your medicine. · If your doctor recommends iron pills, take them as directed:  ¨ Try to take the pills on an empty stomach about 1 hour before or 2 hours after meals. But you may need to take iron with food to avoid an upset stomach. ¨ Do not take antacids or drink milk or caffeine drinks (such as coffee, tea, or cola) at the same time or within 2 hours of the time that you take your iron. They can make it hard for your body to absorb the iron. ¨ Vitamin C (from food or supplements) helps your body absorb iron. Try taking iron pills with a glass of orange juice or some other food that is high in vitamin C, such as citrus fruits. ¨ Iron pills may cause stomach problems, such as heartburn, nausea, diarrhea, constipation, and cramps. Be sure to drink plenty of fluids, and include fruits, vegetables, and fiber in your diet each day. Iron pills often make your bowel movements dark or green. ¨ If you forget to take an iron pill, do not take a double dose of iron the next time you take a pill. ¨ Keep iron pills out of the reach of small children. An overdose of iron can be very dangerous. · Follow your doctor's advice about eating iron-rich foods. These include red meat, shellfish, poultry, eggs, beans, raisins, whole-grain bread, and leafy green vegetables. · Steam vegetables to help them keep their iron content. When should you call for help? Call 911 anytime you think you may need emergency care. For example, call if:  ? · You have symptoms of a heart attack. These may include:  ¨ Chest pain or pressure, or a strange feeling in the chest.  ¨ Sweating. ¨ Shortness of breath. ¨ Nausea or vomiting. ¨ Pain, pressure, or a strange feeling in the back, neck, jaw, or upper belly or in one or both shoulders or arms. ¨ Lightheadedness or sudden weakness. ¨ A fast or irregular heartbeat. After you call 911, the  may tell you to chew 1 adult-strength or 2 to 4 low-dose aspirin. Wait for an ambulance. Do not try to drive yourself. ? · You passed out (lost consciousness).    ?Call your doctor now or seek immediate medical care if:  ? · You have new or increased shortness of breath. ? · You are dizzy or lightheaded, or you feel like you may faint. ? · Your fatigue and weakness continue or get worse. ? · You have any abnormal bleeding, such as:  ¨ Nosebleeds. ¨ Vaginal bleeding that is different (heavier, more frequent, at a different time of the month) than what you are used to. ¨ Bloody or black stools, or rectal bleeding. ¨ Bloody or pink urine. ? Watch closely for changes in your health, and be sure to contact your doctor if:  ? · You do not get better as expected. Where can you learn more? Go to http://caprice-carlos.info/. Enter R301 in the search box to learn more about \"Anemia: Care Instructions. \"  Current as of: October 13, 2016  Content Version: 11.4  © 2757-3022 [employer]. Care instructions adapted under license by [employer] (which disclaims liability or warranty for this information). If you have questions about a medical condition or this instruction, always ask your healthcare professional. [place] [address] any warranty or liability for your use of this information.

## 2018-08-20 ENCOUNTER — PATIENT OUTREACH (OUTPATIENT)
Dept: FAMILY MEDICINE CLINIC | Age: 68
End: 2018-08-20

## 2018-08-20 RX ORDER — DICLOFENAC SODIUM 25 MG/1
TABLET, DELAYED RELEASE ORAL 2 TIMES DAILY
COMMUNITY
End: 2019-01-14

## 2018-08-20 RX ORDER — CYCLOBENZAPRINE HCL 10 MG
TABLET ORAL
COMMUNITY
End: 2019-01-14

## 2018-08-20 NOTE — PROGRESS NOTES
Hospital Discharge Follow-Up      Date/Time:  2018 4:35 PM    Patient was admitted to [facility] on 2018 and discharged on 2018 for TMJ pain. The physician discharge summary was available at the time of outreach. Patient was contacted within 1 business days of discharge. Top Challenges reviewed with the provider   none         Method of communication with provider :none    Inpatient RRAT score: ED visit only  Was this a readmission? no   Patient stated reason for the readmission: n/a    Nurse Navigator (NN) contacted the patient by telephone to perform post hospital discharge assessment. Verified name and  with patient as identifiers. Provided introduction to self, and explanation of the Nurse Navigator role. Reviewed discharge instructions and red flags with patient who verbalized understanding. Patient given an opportunity to ask questions and does not have any further questions or concerns at this time. The patient agrees to contact the PCP office for questions related to their healthcare. NN provided contact information for future reference. Disease Specific:   N/A    Summary of patient's top problems:  1. TMJ pain    Home Health orders at discharge: [address]: n/a  Date of initial visit: n/a    Durable Medical Equipment ordered/company: n/a  Durable Medical Equipment received: n/a    Barriers to care? none identified    Advance Care Planning:   Does patient have an Advance Directive:  reviewed and current     Medication(s):   New Medications at Discharge: Voltaren Flexeril  Changed Medications at Discharge: none  Discontinued Medications at Discharge: none    Medication reconciliation was performed with patient, who verbalizes understanding of administration of home medications. There were no barriers to obtaining medications identified at this time.     Referral to Pharm D needed: no     Current Outpatient Prescriptions   Medication Sig    diclofenac EC (VOLTAREN) 25 mg EC tablet Take  by mouth two (2) times a day.  cyclobenzaprine (FLEXERIL) 10 mg tablet Take  by mouth three (3) times daily as needed for Muscle Spasm(s).  phenytoin ER (DILANTIN ER) 100 mg ER capsule TAKE 3 CAPSULES BY MOUTH AT BEDTIME    hydroCHLOROthiazide (HYDRODIURIL) 12.5 mg tablet TAKE 1 TABLET BY MOUTH ONCE DAILY    /HYPROMELLOSE/GLYCERIN (EYE DROP TEARS OP) Apply  to eye.  aspirin (ASPIRIN) 325 mg tablet Take 1 Tab by mouth daily.  gabapentin (NEURONTIN) 100 mg capsule Take 1 Cap by mouth three (3) times daily. No current facility-administered medications for this visit. There are no discontinued medications. [facility] follow up appointment(s): Future Appointments  Date Time Provider Ryan Andrea   8/23/2018 8:00 AM [facility]      Non-[facility] follow up appointment(s): n/a  [facility]:  n/a       Goals      Attends follow-up appointments as directed.             8/20/2018:  PCP 8/23/2018

## 2018-08-23 ENCOUNTER — OFFICE VISIT (OUTPATIENT)
Dept: FAMILY MEDICINE CLINIC | Age: 68
End: 2018-08-23

## 2018-08-23 VITALS
TEMPERATURE: 98.7 F | WEIGHT: 134 LBS | OXYGEN SATURATION: 95 % | HEART RATE: 77 BPM | RESPIRATION RATE: 18 BRPM | HEIGHT: 61 IN | SYSTOLIC BLOOD PRESSURE: 139 MMHG | BODY MASS INDEX: 25.3 KG/M2 | DIASTOLIC BLOOD PRESSURE: 70 MMHG

## 2018-08-23 DIAGNOSIS — G40.909 SEIZURE DISORDER (HCC): ICD-10-CM

## 2018-08-23 DIAGNOSIS — I10 ESSENTIAL HYPERTENSION: Primary | ICD-10-CM

## 2018-08-23 DIAGNOSIS — Z00.00 MEDICARE ANNUAL WELLNESS VISIT, SUBSEQUENT: ICD-10-CM

## 2018-08-23 DIAGNOSIS — Z13.39 SCREENING FOR ALCOHOLISM: ICD-10-CM

## 2018-08-23 DIAGNOSIS — Z13.31 SCREENING FOR DEPRESSION: ICD-10-CM

## 2018-08-23 DIAGNOSIS — M26.609 TMJ (TEMPOROMANDIBULAR JOINT DISORDER): ICD-10-CM

## 2018-08-23 RX ORDER — ASPIRIN 81 MG/1
81 TABLET ORAL DAILY
Qty: 100 TAB | Refills: 3 | Status: SHIPPED | OUTPATIENT
Start: 2018-08-23

## 2018-08-23 NOTE — MR AVS SNAPSHOT
[facility] 
 
 
 [address] [address] 
[phone] Patient: Alexey Mckenzie MRN: [id number] RMM:0/50/3714 Visit Information Date & Time Provider Department Dept. Phone Encounter #  
 8/23/2018  8:00 AM [facility] [place] [id number] Follow-up Instructions Return in about 6 months (around 2/23/2019), or if symptoms worsen or fail to improve. Upcoming Health Maintenance Date Due DTaP/Tdap/Td series (1 - Tdap) 5/27/1969 GLAUCOMA SCREENING Q2Y 5/27/2013 Pneumococcal 65+ Low/Medium Risk (2 of 2 - PPSV23) 5/26/2017 MEDICARE YEARLY EXAM 6/28/2018 Influenza Age 5 to Adult 8/1/2018 BREAST CANCER SCRN MAMMOGRAM 7/25/2019 COLONOSCOPY 3/25/2025 Allergies as of 8/23/2018  Review Complete On: 5/4/2018 By: Dianna Washburn MD  
 No Known Allergies Current Immunizations  Never Reviewed No immunizations on file. Not reviewed this visit You Were Diagnosed With   
  
 Codes Comments Essential hypertension    -  Primary ICD-10-CM: I10 
ICD-9-CM: 401.9 Seizure disorder ([facility]     ICD-10-CM: G40.909 ICD-9-CM: 345.90 Vitals BP Pulse Temp Resp Height(growth percentile) Weight(growth percentile) 155/70 77 98.7 °F (37.1 °C) (Oral) 18 5' 1\" (1.549 m) 134 lb (60.8 kg) SpO2 BMI OB Status Smoking Status 95% 25.32 kg/m2 Postmenopausal Never Smoker Vitals History BMI and BSA Data Body Mass Index Body Surface Area  
 25.32 kg/m 2 1.62 m 2 Preferred Pharmacy Pharmacy Name Phone [address] [place] [address] [phone] Your Updated Medication List  
  
   
This list is accurate as of 8/23/18  8:43 AM.  Always use your most recent med list.  
  
  
  
  
 * aspirin 325 mg tablet Commonly known as:  ASPIRIN Take 1 Tab by mouth daily. * aspirin delayed-release 81 mg tablet Take 1 Tab by mouth daily. cyclobenzaprine 10 mg tablet Commonly known as:  FLEXERIL Take  by mouth three (3) times daily as needed for Muscle Spasm(s). diclofenac EC 25 mg EC tablet Commonly known as:  VOLTAREN Take  by mouth two (2) times a day. hydroCHLOROthiazide 12.5 mg tablet Commonly known as:  HYDRODIURIL  
TAKE 1 TABLET BY MOUTH ONCE DAILY phenytoin  mg ER capsule Commonly known as:  DILANTIN ER  
TAKE 3 CAPSULES BY MOUTH AT BEDTIME  
  
 * Notice: This list has 2 medication(s) that are the same as other medications prescribed for you. Read the directions carefully, and ask your doctor or other care provider to review them with you. Prescriptions Sent to Pharmacy Refills  
 aspirin delayed-release 81 mg tablet 3 Sig: Take 1 Tab by mouth daily. Class: Normal  
 Pharmacy: [facility] [address] SCOOTER DIALLO [address] [place] [address] #: [phone] Route: Oral  
  
We Performed the Following CBC W/O DIFF [79987 CPT(R)] LIPID PANEL [57186 CPT(R)] METABOLIC PANEL, COMPREHENSIVE [77557 CPT(R)] PHENYTOIN E253591 CPT(R)] TSH 3RD GENERATION [48734 CPT(R)] Follow-up Instructions Return in about 6 months (around 2/23/2019), or if symptoms worsen or fail to improve. Introducing [facility]! [facility] introduces [employer] patient portal. Now you can access parts of your medical record, email your doctor's office, and request medication refills online. 1. In your internet browser, go to [email or web address] 2. Click on the First Time User? Click Here link in the Sign In box. You will see the New Member Sign Up page. 3. Enter your [employer] Access Code exactly as it appears below. You will not need to use this code after youve completed the sign-up process. If you do not sign up before the expiration date, you must request a new code. · [employer] Access Code: [email or web address] Expires: 11/21/2018  8:43 AM 
 
4. Enter the last four digits of your Social Security Number (xxxx) and Date of Birth (mm/dd/yyyy) as indicated and click Submit. You will be taken to the next sign-up page. 5. Create a [employer] ID. This will be your [employer] login ID and cannot be changed, so think of one that is secure and easy to remember. 6. Create a [employer] password. You can change your password at any time. 7. Enter your Password Reset Question and Answer. This can be used at a later time if you forget your password. 8. Enter your e-mail address. You will receive e-mail notification when new information is available in [address] 9. Click Sign Up. You can now view and download portions of your medical record. 10. Click the Download Summary menu link to download a portable copy of your medical information. If you have questions, please visit the Frequently Asked Questions section of the [employer] website. Remember, [employer] is NOT to be used for urgent needs. For medical emergencies, dial 911. Now available from your iPhone and Android! Please provide this summary of care documentation to your next provider. Your primary care clinician is listed as Jorge Fuller. If you have any questions after today's visit, please call [phone].

## 2018-08-23 NOTE — PATIENT INSTRUCTIONS
Medicare Wellness Visit, Female     The best way to live healthy is to have a lifestyle where you eat a well-balanced diet, exercise regularly, limit alcohol use, and quit all forms of tobacco/nicotine, if applicable. Regular preventive services are another way to keep healthy. Preventive services (vaccines, screening tests, monitoring & exams) can help personalize your care plan, which helps you manage your own care. Screening tests can find health problems at the earliest stages, when they are easiest to treat. [facility] follows the current, evidence-based guidelines published by the [facility] ([facility]) when recommending preventive services for our patients. Because we follow these guidelines, sometimes recommendations change over time as research supports it. (For example, mammograms used to be recommended annually. Even though Medicare will still pay for an annual mammogram, the newer guidelines recommend a mammogram every two years for women of average risk.)  Of course, you and your doctor may decide to screen more often for some diseases, based on your risk and your health status. Preventive services for you include:  - Medicare offers their members a free annual wellness visit, which is time for you and your primary care provider to discuss and plan for your preventive service needs. Take advantage of this benefit every year!  -All adults over the age of 72 should receive the recommended pneumonia vaccines. Current USPSTF guidelines recommend a series of two vaccines for the best pneumonia protection.   -All adults should have a flu vaccine yearly and a tetanus vaccine every 10 years. All adults age 61 and older should receive a shingles vaccine once in their lifetime.    -A bone mass density test is recommended when a woman turns 65 to screen for osteoporosis. This test is only recommended one time, as a screening.  Some providers will use this same test as a disease monitoring tool if you already have osteoporosis. -All adults age 38-68 who are overweight should have a diabetes screening test once every three years.   -Other screening tests and preventive services for persons with diabetes include: an eye exam to screen for diabetic retinopathy, a kidney function test, a foot exam, and stricter control over your cholesterol.   -Cardiovascular screening for adults with routine risk involves an electrocardiogram (ECG) at intervals determined by your doctor.   -Colorectal cancer screenings should be done for adults age 54-65 with no increased risk factors for colorectal cancer. There are a number of acceptable methods of screening for this type of cancer. Each test has its own benefits and drawbacks. Discuss with your doctor what is most appropriate for you during your annual wellness visit. The different tests include: colonoscopy (considered the best screening method), a fecal occult blood test, a fecal DNA test, and sigmoidoscopy. -Breast cancer screenings are recommended every other year for women of normal risk, age 54-69.  -Cervical cancer screenings for women over age 72 are only recommended with certain risk factors.   -All adults born between [facility] should be screened once for Hepatitis C. Here is a list of your current Health Maintenance items (your personalized list of preventive services) with a due date:  Health Maintenance Due   Topic Date Due    DTaP/Tdap/Td  (1 - Tdap) 05/27/1969    Glaucoma Screening   05/27/2013    Pneumococcal Vaccine (2 of 2 - PPSV23) 05/26/2017    Annual Well Visit  06/28/2018    Flu Vaccine  08/01/2018            Advance Directives: Care Instructions  Your Care Instructions  An advance directive is a legal way to state your wishes at the end of your life. It tells your family and your doctor what to do if you can no longer say what you want. There are two main types of advance directives.  You can change them any time that your wishes change. · A living will tells your family and your doctor your wishes about life support and other treatment. · A durable power of  for health care lets you name a person to make treatment decisions for you when you can't speak for yourself. This person is called a health care agent. If you do not have an advance directive, decisions about your medical care may be made by a doctor or a  who doesn't know you. It may help to think of an advance directive as a gift to the people who care for you. If you have one, they won't have to make tough decisions by themselves. Follow-up care is a key part of your treatment and safety. Be sure to make and go to all appointments, and call your doctor if you are having problems. It's also a good idea to know your test results and keep a list of the medicines you take. How can you care for yourself at home? · Discuss your wishes with your loved ones and your doctor. This way, there are no surprises. · Many states have a unique form. Or you might use a universal form that has been approved by many states. This kind of form can sometimes be completed and stored online. Your electronic copy will then be available wherever you have a connection to the Internet. In most cases, doctors will respect your wishes even if you have a form from a different state. · You don't need a  to do an advance directive. But you may want to get legal advice. · Think about these questions when you prepare an advance directive:  ¨ Who do you want to make decisions about your medical care if you are not able to? Many people choose a family member or close friend. ¨ Do you know enough about life support methods that might be used? If not, talk to your doctor so you understand. ¨ What are you most afraid of that might happen? You might be afraid of having pain, losing your independence, or being kept alive by machines. ¨ Where would you prefer to die? Choices include your home, a hospital, or a nursing home. ¨ Would you like to have information about hospice care to support you and your family? ¨ Do you want to donate organs when you die? ¨ Do you want certain Worship practices performed before you die? If so, put your wishes in the advance directive. · Read your advance directive every year, and make changes as needed. When should you call for help? Be sure to contact your doctor if you have any questions. Where can you learn more? Go to http://caprice-carlos.info/. Enter R264 in the search box to learn more about \"Advance Directives: Care Instructions. \"  Current as of: October 6, 2017  Content Version: 11.7  © 2946-1419 [employer]. Care instructions adapted under license by [employer] (which disclaims liability or warranty for this information). If you have questions about a medical condition or this instruction, always ask your healthcare professional. [address] any warranty or liability for your use of this information.

## 2018-08-23 NOTE — PROGRESS NOTES
Chief Complaint   Patient presents with    TMJ     she is a 77y.o. year old female who presents post ED encounter for left jaw pain. Diagnosed with TMJ. Says symptoms significantly improved with treatment. Patient with hx of HTN, anemia and seizure disorder. She is due for lab work. Hypertension:  The patient reports:  taking medications as instructed, no medication side effects noted, no TIA's, no chest pain on exertion, no dyspnea on exertion, no swelling of ankles. BP Readings from Last 3 Encounters:   08/23/18 139/70   05/04/18 145/71   11/01/17 177/78       Patient advised to log blood pressures at home 3-5 times monthly and bring to next visit. Call office as soon as possible if BP's over 140/90 on multiple occasions or with symptoms of dizziness, chest pain, shortness of breath, headache or ankle swelling. Our goal is to normalize the blood pressure to decrease the risks of strokes and heart attacks. The patient is in agreement with the plan. Patient Active Problem List   Diagnosis Code    [facility] G40.909    Essential hypertension I10    Anemia D64.9    Primary open angle glaucoma H40.1190    Glaucoma, open angle, severe stage H40.10X3    Age-related nuclear cataract of both eyes H25.13     History reviewed. No pertinent surgical history. Social History     Social History    Marital status:      Spouse name: N/A    Number of children: N/A    Years of education: N/A     Occupational History    Not on file.      Social History Main Topics    Smoking status: Never Smoker    Smokeless tobacco: Never Used    Alcohol use No    Drug use: No    Sexual activity: Yes     Partners: Male     Other Topics Concern    Not on file     Social History Narrative     Family History   Problem Relation Age of Onset    Stroke Father     Heart Disease Sister      Current Outpatient Prescriptions   Medication Sig    aspirin delayed-release 81 mg tablet Take 1 Tab by mouth daily.    diclofenac EC (VOLTAREN) 25 mg EC tablet Take  by mouth two (2) times a day.  cyclobenzaprine (FLEXERIL) 10 mg tablet Take  by mouth three (3) times daily as needed for Muscle Spasm(s).  phenytoin ER (DILANTIN ER) 100 mg ER capsule TAKE 3 CAPSULES BY MOUTH AT BEDTIME    hydroCHLOROthiazide (HYDRODIURIL) 12.5 mg tablet TAKE 1 TABLET BY MOUTH ONCE DAILY    aspirin (ASPIRIN) 325 mg tablet Take 1 Tab by mouth daily. No current facility-administered medications for this visit. No Known Allergies    Review of Systems:  Constitutional: Negative for fatigue or malaise  Derm: Negative for rash or lesion  HEENT: Negative for acute hearing or vision changes  Cardiovascular: Negative for dizziness, chest pain or palpitations  Respiratory: Negative for cough, wheezing or SOB  Gastreintestinal: Negative for nausea or abdominal pain  Genital/urinary: Negative for dysuria or voiding dysfunction  Muscoloskeletal: Negative for acute myalgias or arthralgias   Neurological: see HPI, Negative for headache, weakness or paresthesia  Psychological: Negative for depression or anxiety      Vitals:    08/23/18 0813 08/23/18 0849   BP: 155/70 139/70   Pulse: 77    Resp: 18    Temp: 98.7 °F (37.1 °C)    TempSrc: Oral    SpO2: 95%    Weight: 134 lb (60.8 kg)    Height: 5' 1\" (1.549 m)      Physical Exam:  Constitutional: well developed, well nourished, in no acute distress  Skin: warm and dry, normal tone and turgor  Head: normocephalic, atraumatic. Mild left TMJ TTP  Eyes: sclera clear, EOMI  Neck: normal range of motion  Cardiovascular: normal S1, S2, regular rate and rhythm  Respiratory: clear to auscultation bilaterally with symmetrical effort  Extremities: full range of motion  Neurology: normal strength and sensation  Psych: active, alert and oriented, affect appropriate       Diagnoses and all orders for this visit:    1. Essential hypertension  -     aspirin delayed-release 81 mg tablet;  Take 1 Tab by mouth daily.  -     LIPID PANEL  -     METABOLIC PANEL, COMPREHENSIVE  -     TSH 3RD GENERATION  -     CBC W/O DIFF    2. Seizure disorder (HCC)  -     PHENYTOIN    3. TMJ (temporomandibular joint disorder)      I have discussed the diagnosis with the patient and the intended plan as seen in the above orders. The patient expresses understanding and agreement with our plan of care. The patient has received an after-visit summary. The patient knows to call our office if there are any questions or concerns regarding diagnosis and treatment plans. I have discussed medication side effects and warnings with the patient as well. Follow-up Disposition:  Return in about 6 months (around 2/23/2019), or if symptoms worsen or fail to improve. The following Annual Medicare Wellness Exam is distinct and separate from the medical evaluation and decision making. This is the Subsequent Medicare Annual Wellness Exam, performed 12 months or more after the Initial AWV or the last Subsequent AWV    I have reviewed the patient's medical history in detail and updated the computerized patient record. History     Past Medical History:   Diagnosis Date    Essential hypertension 11/2/2016    [facility] ([facility]     Seizures ([facility]       History reviewed. No pertinent surgical history. Current Outpatient Prescriptions   Medication Sig Dispense Refill    aspirin delayed-release 81 mg tablet Take 1 Tab by mouth daily. 100 Tab 3    diclofenac EC (VOLTAREN) 25 mg EC tablet Take  by mouth two (2) times a day.  cyclobenzaprine (FLEXERIL) 10 mg tablet Take  by mouth three (3) times daily as needed for Muscle Spasm(s).  phenytoin ER (DILANTIN ER) 100 mg ER capsule TAKE 3 CAPSULES BY MOUTH AT BEDTIME 90 Cap 1    hydroCHLOROthiazide (HYDRODIURIL) 12.5 mg tablet TAKE 1 TABLET BY MOUTH ONCE DAILY 90 Tab 0    aspirin (ASPIRIN) 325 mg tablet Take 1 Tab by mouth daily.        No Known Allergies  Family History   Problem Relation Age of Onset    Stroke Father     Heart Disease Sister      Social History   Substance Use Topics    Smoking status: Never Smoker    Smokeless tobacco: Never Used    Alcohol use No     Patient Active Problem List   Diagnosis Code    Seizure disorder ([facility] G40.909    Essential hypertension I10    Anemia D64.9    Primary open angle glaucoma H40.1190    Glaucoma, open angle, severe stage H40.10X3    Age-related nuclear cataract of both eyes H25.13       Depression Risk Factor Screening:     PHQ over the last two weeks 8/24/2018   Little interest or pleasure in doing things Not at all   Feeling down, depressed, irritable, or hopeless Not at all   Total Score PHQ 2 0     Alcohol Risk Factor Screening: You do not drink alcohol or very rarely. Functional Ability and Level of Safety:   Hearing Loss  Hearing is good. Activities of Daily Living  The home contains: no safety equipment. Patient does total self care    Fall Risk  Fall Risk Assessment, last 12 mths 8/24/2018   Able to walk? Yes   Fall in past 12 months? No       Abuse Screen  Patient is not abused    Cognitive Screening   Evaluation of Cognitive Function:  Has your family/caregiver stated any concerns about your memory: no  Normal    Patient Care Team   Patient Care Team:  Jorge Fuller MD as PCP - General (Family Practice)  Ignacio Arciniega OD (Optometry)    Assessment/Plan   Education and counseling provided:  Are appropriate based on today's review and evaluation  End-of-Life planning (with patient's consent)    Diagnoses and all orders for this visit:    1. Medicare annual wellness visit, subsequent    2. Screening for alcoholism  -     Annual  Alcohol Screen 15 min ()    3.  Screening for depression  -     Depression Screen Annual        Health Maintenance Due   Topic Date Due    DTaP/Tdap/Td series (1 - Tdap) 05/27/1969    GLAUCOMA SCREENING Q2Y  05/27/2013    Pneumococcal 65+ Low/Medium Risk (2 of 2 - PPSV23) 05/26/2017  MEDICARE YEARLY EXAM  06/28/2018    Influenza Age 5 to Adult  08/01/2018     Risk, benefits and potential costs of recommended health maintenance discussed. Patient expressed understanding and declined at this time except as seen in orders.

## 2018-08-23 NOTE — PROGRESS NOTES
1. Have you been to the ER, urgent care clinic, or been hospitalized since your last visit? No     2. Have you seen or consulted any other health care providers outside of the [address] [place] [address] since your last visit?    No     Reviewed record in preparation for visit and have necessary documentation  opportunity was given for questions  Goals that were addressed and/or need to be completed during or after this appointment include     Health Maintenance Due   Topic Date Due    DTaP/Tdap/Td series (1 - Tdap) 05/27/1969    GLAUCOMA SCREENING Q2Y  05/27/2013    Pneumococcal 65+ Low/Medium Risk (2 of 2 - PPSV23) 05/26/2017    MEDICARE YEARLY EXAM  06/28/2018    Influenza Age 9 to Adult  08/01/2018

## 2018-08-24 LAB
ALBUMIN SERPL-MCNC: 4.2 G/DL (ref 3.5–4.8)
ALBUMIN/GLOB SERPL: 1.4 {RATIO} (ref 1.2–2.2)
ALP SERPL-CCNC: 84 IU/L (ref 39–117)
ALT SERPL-CCNC: 16 IU/L (ref 0–32)
AST SERPL-CCNC: 25 IU/L (ref 0–40)
BILIRUB SERPL-MCNC: <0.2 MG/DL (ref 0–1.2)
BUN SERPL-MCNC: 14 MG/DL (ref 8–27)
BUN/CREAT SERPL: 19 (ref 12–28)
CALCIUM SERPL-MCNC: 9.5 MG/DL (ref 8.7–10.3)
CHLORIDE SERPL-SCNC: 101 MMOL/L (ref 96–106)
CHOLEST SERPL-MCNC: 255 MG/DL (ref 100–199)
CO2 SERPL-SCNC: 28 MMOL/L (ref 20–29)
CREAT SERPL-MCNC: 0.75 MG/DL (ref 0.57–1)
ERYTHROCYTE [DISTWIDTH] IN BLOOD BY AUTOMATED COUNT: 15.9 % (ref 12.3–15.4)
GLOBULIN SER CALC-MCNC: 3 G/DL (ref 1.5–4.5)
GLUCOSE SERPL-MCNC: 91 MG/DL (ref 65–99)
HCT VFR BLD AUTO: 33.9 % (ref 34–46.6)
HDLC SERPL-MCNC: 111 MG/DL
HGB BLD-MCNC: 11 G/DL (ref 11.1–15.9)
LDLC SERPL CALC-MCNC: 126 MG/DL (ref 0–99)
MCH RBC QN AUTO: 29.6 PG (ref 26.6–33)
MCHC RBC AUTO-ENTMCNC: 32.4 G/DL (ref 31.5–35.7)
MCV RBC AUTO: 91 FL (ref 79–97)
PHENYTOIN SERPL-MCNC: 4.3 UG/ML (ref 10–20)
PLATELET # BLD AUTO: 298 X10E3/UL (ref 150–379)
POTASSIUM SERPL-SCNC: 3.7 MMOL/L (ref 3.5–5.2)
PROT SERPL-MCNC: 7.2 G/DL (ref 6–8.5)
RBC # BLD AUTO: 3.72 X10E6/UL (ref 3.77–5.28)
SODIUM SERPL-SCNC: 144 MMOL/L (ref 134–144)
TRIGL SERPL-MCNC: 89 MG/DL (ref 0–149)
TSH SERPL DL<=0.005 MIU/L-ACNC: 1.03 UIU/ML (ref 0.45–4.5)
VLDLC SERPL CALC-MCNC: 18 MG/DL (ref 5–40)
WBC # BLD AUTO: 3.5 X10E3/UL (ref 3.4–10.8)

## 2018-08-24 NOTE — ACP (ADVANCE CARE PLANNING)
Advance directives discussed with patient who expressed understanding and declined taking any action today.

## 2018-11-05 ENCOUNTER — OFFICE VISIT (OUTPATIENT)
Dept: FAMILY MEDICINE CLINIC | Age: 68
End: 2018-11-05

## 2018-11-05 VITALS
BODY MASS INDEX: 26.58 KG/M2 | HEART RATE: 74 BPM | TEMPERATURE: 98.1 F | WEIGHT: 140.8 LBS | RESPIRATION RATE: 20 BRPM | SYSTOLIC BLOOD PRESSURE: 143 MMHG | HEIGHT: 61 IN | DIASTOLIC BLOOD PRESSURE: 71 MMHG | OXYGEN SATURATION: 100 %

## 2018-11-05 DIAGNOSIS — I10 ESSENTIAL HYPERTENSION: Primary | ICD-10-CM

## 2018-11-05 DIAGNOSIS — G40.909 SEIZURE DISORDER (HCC): ICD-10-CM

## 2018-11-05 DIAGNOSIS — R29.898 TEMPORARY WEAKNESS OF EXTREMITY: ICD-10-CM

## 2018-11-05 LAB
ALBUMIN UR QL STRIP: 80 MG/L
BILIRUB UR QL STRIP: NORMAL
CREATININE, URINE POC: 300 MG/DL
GLUCOSE UR-MCNC: NEGATIVE MG/DL
KETONES P FAST UR STRIP-MCNC: NORMAL MG/DL
MICROALBUMIN/CREAT RATIO POC: NORMAL MG/G
PH UR STRIP: 5.5 [PH] (ref 4.6–8)
PROT UR QL STRIP: NORMAL
SP GR UR STRIP: 1.02 (ref 1–1.03)
UA UROBILINOGEN AMB POC: NORMAL (ref 0.2–1)
URINALYSIS CLARITY POC: CLEAR
URINALYSIS COLOR POC: YELLOW
URINE BLOOD POC: NORMAL
URINE LEUKOCYTES POC: NEGATIVE
URINE NITRITES POC: NEGATIVE

## 2018-11-05 RX ORDER — TIMOLOL MALEATE 5 MG/ML
1 SOLUTION/ DROPS OPHTHALMIC 2 TIMES DAILY
COMMUNITY

## 2018-11-05 RX ORDER — CHLORTHALIDONE 25 MG/1
25 TABLET ORAL DAILY
Qty: 30 TAB | Refills: 0 | Status: SHIPPED | OUTPATIENT
Start: 2018-11-05 | End: 2018-12-20 | Stop reason: ALTCHOICE

## 2018-11-05 NOTE — PROGRESS NOTES
Chief Complaint Patient presents with  Elevated Blood Pressure  Extremity Weakness  
 
she is a 77y.o. year old female who presents for elevated BP. Patient with hx of HTN, anemia and seizure disorder. She complains of episodic b/l extremity weakness. Hypertension: The patient reports taking medications as instructed, no medication side effects noted, no TIA's, no chest pain on exertion, no dyspnea on exertion, no swelling of ankles. BP Readings from Last 3 Encounters:  
11/05/18 143/71  
08/23/18 139/70  
05/04/18 145/71 Patient advised to log blood pressures at home daily and bring to next visit. Call office as soon as possible if BP's over 140/90 on multiple occasions or with symptoms of dizziness, chest pain, shortness of breath, headache or ankle swelling. Our goal is to normalize the blood pressure to decrease the risks of strokes and heart attacks. The patient is in agreement with the plan. Patient Active Problem List  
Diagnosis Code  [facility] G[facility].909  Essential hypertension I10  
 Anemia D64.9  
 Primary open angle glaucoma H40.1190  Glaucoma, open angle, severe stage H40.10X3  Age-related nuclear cataract of both eyes H25.13 History reviewed. No pertinent surgical history. Social History Socioeconomic History  Marital status:  Spouse name: Not on file  Number of children: Not on file  Years of education: Not on file  Highest education level: Not on file Social Needs  Financial resource strain: Not on file  Food insecurity - worry: Not on file  Food insecurity - inability: Not on file  Transportation needs - medical: Not on file  Transportation needs - non-medical: Not on file Occupational History  Not on file Tobacco Use  Smoking status: Never Smoker  Smokeless tobacco: Never Used Substance and Sexual Activity  Alcohol use: No  
 Drug use: No  
 Sexual activity: Yes  
 Partners: Male Other Topics Concern  Not on file Social History Narrative  Not on file Family History Problem Relation Age of Onset  Stroke Father  Heart Disease Sister Current Outpatient Medications Medication Sig  timolol (TIMOPTIC) 0.5 % ophthalmic solution 1 Drop two (2) times a day.  chlorthalidone (HYGROTEN) 25 mg tablet Take 1 Tab by mouth daily.  phenytoin ER (DILANTIN ER) 100 mg ER capsule TAKE 3 CAPSULES BY MOUTH AT BEDTIME  aspirin delayed-release 81 mg tablet Take 1 Tab by mouth daily.  diclofenac EC (VOLTAREN) 25 mg EC tablet Take  by mouth two (2) times a day.  cyclobenzaprine (FLEXERIL) 10 mg tablet Take  by mouth three (3) times daily as needed for Muscle Spasm(s).  aspirin (ASPIRIN) 325 mg tablet Take 1 Tab by mouth daily. No current facility-administered medications for this visit. No Known Allergies Review of Systems: 
Constitutional: Negative for fatigue or malaise Derm: Negative for rash or lesion HEENT: Negative for acute hearing or vision changes Cardiovascular: Negative for dizziness, chest pain or palpitations Respiratory: Negative for cough, wheezing or SOB Gastreintestinal: Negative for nausea or abdominal pain Genital/urinary: Negative for dysuria or voiding dysfunction Muscoloskeletal: Negative for acute myalgias or arthralgias Neurological: see HPI, Negative for headache, weakness or paresthesia Psychological: Negative for depression or anxiety Vitals:  
 11/05/18 1310 11/05/18 1325 BP: 176/77 143/71 Pulse: 74 Resp: 20 Temp: 98.1 °F (36.7 °C) TempSrc: Oral   
SpO2: 100% Weight: 140 lb 12.8 oz (63.9 kg) Height: 5' 1\" (1.549 m) Physical Exam: 
Constitutional: well developed, well nourished, in no acute distress Skin: warm and dry, normal tone and turgor Head: normocephalic, atraumatic. Eyes: sclera clear, EOMI Neck: normal range of motion Cardiovascular: normal S1, S2, regular rate and rhythm Respiratory: clear to auscultation bilaterally with symmetrical effort Extremities: full range of motion, normal gait Neurology: normal strength and sensation Psych: active, alert and oriented, affect appropriate Diagnoses and all orders for this visit: 1. Essential hypertension 
-     chlorthalidone (HYGROTEN) 25 mg tablet; Take 1 Tab by mouth daily. 
-     AMB POC URINALYSIS DIP STICK AUTO W/O MICRO 
-     AMB POC URINE, MICROALBUMIN, SEMIQUANT (3 RESULTS) 2. Temporary weakness of extremity 3. Seizure disorder ([facility] [facility] I have discussed the diagnosis with the patient and the intended plan as seen in the above orders. The patient expresses understanding and agreement with our plan of care. The patient has received an after-visit summary. The patient knows to call our office if there are any questions or concerns regarding diagnosis and treatment plans. I have discussed medication side effects and warnings with the patient as well. Follow-up Disposition: 
Return in about 2 weeks (around 11/19/2018), or if symptoms worsen or fail to improve.

## 2018-11-05 NOTE — PATIENT INSTRUCTIONS
November 5, 2018 Shilpi Bartholomew [address] [address] Dear Claudeen Gaudy: 
 
Thank you for requesting access to Myntra. Please follow the instructions below to view your test results, access and download parts of your medical record, view details of your past and upcoming appointments, and view your medications online. How Do I Sign Up? 1. In your internet browser, go to [email or web address] 2. Click on the First Time User? Click Here link in the Sign In box. You will see the New Member Sign Up page. 3. Enter your Myntra Access Code exactly as it appears below. You will not need to use this code after youve completed the sign-up process. If you do not sign up before the expiration date, you must request a new code. · Myntra Access Code: [email or web address] · Expires: 11/21/2018  7:43 AM 
 
4. Enter the last four digits of your Social Security Number (xxxx) and Date of Birth (mm/dd/yyyy) as indicated and click Submit. You will be taken to the next sign-up page. 5. Create a Myntra ID. This will be your Myntra login ID and cannot be changed, so think of one that is secure and easy to remember. 6. Create a Myntra password. You can change your password at any time. 7. Enter your Password Reset Question and Answer. This can be used at a later time if you forget your password. 8. Enter your e-mail address. You will receive e-mail notification when new information is available in [address]. 9. Click Sign Up. You can now view and download portions of your medical record. 10. Click the Download Summary menu link to download a portable copy of your medical information. Additional Information: If you require any assistance or have any questions, please contact [address] at [phone], email at [email or web address] or check online in our Frequently Asked Questions. Remember, MyChart is NOT to be used for urgent needs. For medical emergencies, dial 911. Now available from your iPhone and Android! Sincerely, Len Bras Learning About High Blood Pressure What is high blood pressure? Blood pressure is a measure of how hard the blood pushes against the walls of your arteries. It's normal for blood pressure to go up and down throughout the day, but if it stays up, you have high blood pressure. Another name for high blood pressure is hypertension. Two numbers tell you your blood pressure. The first number is the systolic pressure. It shows how hard the blood pushes when your heart is pumping. The second number is the diastolic pressure. It shows how hard the blood pushes between heartbeats, when your heart is relaxed and filling with blood. A blood pressure of less than 120/80 (say \"120 over 80\") is ideal for an adult. High blood pressure is 130/80 or higher. You have high blood pressure if your top number is 130 or higher or your bottom number is 80 or higher, or both. What happens when you have high blood pressure? · Blood flows through your arteries with too much force. Over time, this damages the walls of your arteries. But you can't feel it. High blood pressure usually doesn't cause symptoms. · Fat and calcium start to build up in your arteries. This buildup is called plaque. Plaque makes your arteries narrower and stiffer. Blood can't flow through them as easily. · This lack of good blood flow starts to damage some of the organs in your body. This can lead to problems such as coronary artery disease and heart attack, heart failure, stroke, kidney failure, and eye damage. How can you prevent high blood pressure? · Stay at a healthy weight. · Try to limit how much sodium you eat to less than 2,300 milligrams (mg) a day. If you limit your sodium to 1,500 mg a day, you can lower your blood pressure even more. ? Buy foods that are labeled \"unsalted,\" \"sodium-free,\" or \"low-sodium. \" Foods labeled \"reduced-sodium\" and \"light sodium\" may still have too much sodium. ? Flavor your food with garlic, lemon juice, onion, vinegar, herbs, and spices instead of salt. Do not use soy sauce, steak sauce, onion salt, garlic salt, mustard, or ketchup on your food. ? Use less salt (or none) when recipes call for it. You can often use half the salt a recipe calls for without losing flavor. · Be physically active. Get at least 30 minutes of exercise on most days of the week. Walking is a good choice. You also may want to do other activities, such as running, swimming, cycling, or playing tennis or team sports. · Limit alcohol to 2 drinks a day for men and 1 drink a day for women. · Eat plenty of fruits, vegetables, and low-fat dairy products. Eat less saturated and total fats. How is high blood pressure treated? · Your doctor will suggest making lifestyle changes. For example, your doctor may ask you to eat healthy foods, quit smoking, lose extra weight, and be more active. · If lifestyle changes don't help enough, your doctor may recommend that you take medicine. · When blood pressure is very high, medicines are needed to lower it. Follow-up care is a key part of your treatment and safety. Be sure to make and go to all appointments, and call your doctor if you are having problems. It's also a good idea to know your test results and keep a list of the medicines you take. Where can you learn more? Go to http://caprice-carlos.info/. Enter P501 in the search box to learn more about \"Learning About High Blood Pressure. \" Current as of: December 6, 2017 Content Version: 11.8 © 9844-9916 Healthwise, Incorporated. Care instructions adapted under license by [employer] (which disclaims liability or warranty for this information).  If you have questions about a medical condition or this instruction, always ask your healthcare professional. [place] [address] any warranty or liability for your use of this information.

## 2018-11-05 NOTE — PROGRESS NOTES
1. Have you been to the ER, urgent care clinic since your last visit? Hospitalized since your last visit? No 
 
2. Have you seen or consulted any other health care providers outside of the [address] [place] [address] since your last visit? Include any pap smears or colon screening. No 
Reviewed record in preparation for visit and have necessary documentation Pt did not bring medication to office visit for review Information was given to pt on Advanced Directives, Living Will Information was given on Shingles Vaccine 
opportunity was given for questions Goals that were addressed and/or need to be completed during or after this appointment include Health Maintenance Due Topic Date Due  
 DTaP/Tdap/Td series (1 - Tdap) 05/27/1969  Shingrix Vaccine Age 50> (1 of 2) 05/27/1998  GLAUCOMA SCREENING Q2Y  05/27/2013  Pneumococcal 65+ Low/Medium Risk (2 of 2 - PPSV23) 05/26/2017  Influenza Age 5 to Adult  08/01/2018

## 2018-11-19 ENCOUNTER — OFFICE VISIT (OUTPATIENT)
Dept: FAMILY MEDICINE CLINIC | Age: 68
End: 2018-11-19

## 2018-11-19 VITALS
WEIGHT: 139.4 LBS | HEART RATE: 71 BPM | TEMPERATURE: 98.9 F | SYSTOLIC BLOOD PRESSURE: 160 MMHG | BODY MASS INDEX: 26.32 KG/M2 | RESPIRATION RATE: 20 BRPM | OXYGEN SATURATION: 100 % | HEIGHT: 61 IN | DIASTOLIC BLOOD PRESSURE: 74 MMHG

## 2018-11-19 DIAGNOSIS — D64.9 ANEMIA, UNSPECIFIED TYPE: ICD-10-CM

## 2018-11-19 DIAGNOSIS — I10 ESSENTIAL HYPERTENSION: Primary | ICD-10-CM

## 2018-11-19 DIAGNOSIS — G40.909 SEIZURE DISORDER (HCC): ICD-10-CM

## 2018-11-19 DIAGNOSIS — R29.898 COMPLAINTS OF WEAKNESS OF LOWER EXTREMITY: ICD-10-CM

## 2018-11-19 NOTE — PROGRESS NOTES
1. Have you been to the ER, urgent care clinic since your last visit? Hospitalized since your last visit? No    2. Have you seen or consulted any other health care providers outside of the [address] [place] [address] since your last visit? Include any pap smears or colon screening. No  Reviewed record in preparation for visit and have necessary documentation  Pt did not bring medication to office visit for review  Information was given to pt on Advanced Directives, Living Will  Information was given on Shingles Vaccine  opportunity was given for questions  Goals that were addressed and/or need to be completed during or after this appointment include     There are no preventive care reminders to display for this patient.

## 2018-11-22 NOTE — PATIENT INSTRUCTIONS
High Blood Pressure: Care Instructions  Your Care Instructions    If your blood pressure is usually above 130/80, you have high blood pressure, or hypertension. That means the top number is 130 or higher or the bottom number is 80 or higher, or both. Despite what a lot of people think, high blood pressure usually doesn't cause headaches or make you feel dizzy or lightheaded. It usually has no symptoms. But it does increase your risk for heart attack, stroke, and kidney or eye damage. The higher your blood pressure, the more your risk increases. Your doctor will give you a goal for your blood pressure. Your goal will be based on your health and your age. Lifestyle changes, such as eating healthy and being active, are always important to help lower blood pressure. You might also take medicine to reach your blood pressure goal.  Follow-up care is a key part of your treatment and safety. Be sure to make and go to all appointments, and call your doctor if you are having problems. It's also a good idea to know your test results and keep a list of the medicines you take. How can you care for yourself at home? Medical treatment  · If you stop taking your medicine, your blood pressure will go back up. You may take one or more types of medicine to lower your blood pressure. Be safe with medicines. Take your medicine exactly as prescribed. Call your doctor if you think you are having a problem with your medicine. · Talk to your doctor before you start taking aspirin every day. Aspirin can help certain people lower their risk of a heart attack or stroke. But taking aspirin isn't right for everyone, because it can cause serious bleeding. · See your doctor regularly. You may need to see the doctor more often at first or until your blood pressure comes down. · If you are taking blood pressure medicine, talk to your doctor before you take decongestants or anti-inflammatory medicine, such as ibuprofen.  Some of these medicines can raise blood pressure. · Learn how to check your blood pressure at home. Lifestyle changes  · Stay at a healthy weight. This is especially important if you put on weight around the waist. Losing even 10 pounds can help you lower your blood pressure. · If your doctor recommends it, get more exercise. Walking is a good choice. Bit by bit, increase the amount you walk every day. Try for at least 30 minutes on most days of the week. You also may want to swim, bike, or do other activities. · Avoid or limit alcohol. Talk to your doctor about whether you can drink any alcohol. · Try to limit how much sodium you eat to less than 2,300 milligrams (mg) a day. Your doctor may ask you to try to eat less than 1,500 mg a day. · Eat plenty of fruits (such as bananas and oranges), vegetables, legumes, whole grains, and low-fat dairy products. · Lower the amount of saturated fat in your diet. Saturated fat is found in animal products such as milk, cheese, and meat. Limiting these foods may help you lose weight and also lower your risk for heart disease. · Do not smoke. Smoking increases your risk for heart attack and stroke. If you need help quitting, talk to your doctor about stop-smoking programs and medicines. These can increase your chances of quitting for good. When should you call for help? Call 911 anytime you think you may need emergency care. This may mean having symptoms that suggest that your blood pressure is causing a serious heart or blood vessel problem. Your blood pressure may be over 180/120.   For example, call 911 if:    · You have symptoms of a heart attack. These may include:  ? Chest pain or pressure, or a strange feeling in the chest.  ? Sweating. ? Shortness of breath. ? Nausea or vomiting. ? Pain, pressure, or a strange feeling in the back, neck, jaw, or upper belly or in one or both shoulders or arms. ? Lightheadedness or sudden weakness.   ? A fast or irregular heartbeat.     · You have symptoms of a stroke. These may include:  ? Sudden numbness, tingling, weakness, or loss of movement in your face, arm, or leg, especially on only one side of your body. ? Sudden vision changes. ? Sudden trouble speaking. ? Sudden confusion or trouble understanding simple statements. ? Sudden problems with walking or balance. ? A sudden, severe headache that is different from past headaches.     · You have severe back or belly pain.    Do not wait until your blood pressure comes down on its own. Get help right away.   Call your doctor now or seek immediate care if:    · Your blood pressure is much higher than normal (such as 180/120 or higher), but you don't have symptoms.     · You think high blood pressure is causing symptoms, such as:  ? Severe headache.  ? Blurry vision.    Watch closely for changes in your health, and be sure to contact your doctor if:    · Your blood pressure measures higher than your doctor recommends at least 2 times. That means the top number is higher or the bottom number is higher, or both.     · You think you may be having side effects from your blood pressure medicine. Where can you learn more? Go to http://caprice-carlos.info/. Enter G144 in the search box to learn more about \"High Blood Pressure: Care Instructions. \"  Current as of: December 6, 2017  Content Version: 11.8  © 7092-2901 Healthwise, Incorporated. Care instructions adapted under license by [employer] (which disclaims liability or warranty for this information). If you have questions about a medical condition or this instruction, always ask your healthcare professional. [place] [address] any warranty or liability for your use of this information.

## 2018-11-22 NOTE — PROGRESS NOTES
Chief Complaint   Patient presents with    Hypertension     follow up      she is a 77y.o. year old female who presents for elevated BP. Patient with hx of HTN, anemia and seizure disorder. She complains of episodic b/l extremity weakness. She has a pending MRI. Patient says she did not take BP medication today. She is unwilling to increase or add medication to control her BP. Hypertension:  The patient reports no TIA's, no chest pain on exertion, no dyspnea on exertion, no swelling of ankles. BP Readings from Last 3 Encounters:   11/19/18 160/74   11/05/18 143/71   08/23/18 139/70       Patient advised to log blood pressures at home daily and bring to next visit. Call office as soon as possible if BP's over 140/90 on multiple occasions or with symptoms of dizziness, chest pain, shortness of breath, headache or ankle swelling. Our goal is to normalize the blood pressure to decrease the risks of strokes and heart attacks. The patient is in agreement with the plan. Patient Active Problem List   Diagnosis Code    [facility] G40.909    Essential hypertension I10    Anemia D64.9    Primary open angle glaucoma H40.1190    Glaucoma, open angle, severe stage H40.10X3    Age-related nuclear cataract of both eyes H25.13     History reviewed. No pertinent surgical history. Social History     Socioeconomic History    Marital status:      Spouse name: Not on file    Number of children: Not on file    Years of education: Not on file    Highest education level: Not on file   Social Needs    Financial resource strain: Not on file    Food insecurity - worry: Not on file    Food insecurity - inability: Not on file    Transportation needs - medical: Not on file   [employer] needs - non-medical: Not on file   Occupational History    Not on file   Tobacco Use    Smoking status: Never Smoker    Smokeless tobacco: Never Used   Substance and Sexual Activity    Alcohol use:  No  Drug use: No    Sexual activity: Yes     Partners: Male   Other Topics Concern    Not on file   Social History Narrative    Not on file     Family History   Problem Relation Age of Onset    Stroke Father     Heart Disease Sister      Current Outpatient Medications   Medication Sig    timolol (TIMOPTIC) 0.5 % ophthalmic solution 1 Drop two (2) times a day.  chlorthalidone (HYGROTEN) 25 mg tablet Take 1 Tab by mouth daily.  phenytoin ER (DILANTIN ER) 100 mg ER capsule TAKE 3 CAPSULES BY MOUTH AT BEDTIME    aspirin delayed-release 81 mg tablet Take 1 Tab by mouth daily.  diclofenac EC (VOLTAREN) 25 mg EC tablet Take  by mouth two (2) times a day.  cyclobenzaprine (FLEXERIL) 10 mg tablet Take  by mouth three (3) times daily as needed for Muscle Spasm(s).  aspirin (ASPIRIN) 325 mg tablet Take 1 Tab by mouth daily. No current facility-administered medications for this visit. No Known Allergies    Review of Systems:  Constitutional: Negative for fatigue or malaise  Derm: Negative for rash or lesion  HEENT: Negative for acute hearing or vision changes  Cardiovascular: Negative for dizziness, chest pain or palpitations  Respiratory: Negative for cough, wheezing or SOB  Gastreintestinal: Negative for nausea or abdominal pain  Genital/urinary: Negative for dysuria or voiding dysfunction  Muscoloskeletal: Negative for acute myalgias or arthralgias   Neurological: see HPI, Negative for headache, weakness or paresthesia  Psychological: Negative for depression or anxiety      Vitals:    11/19/18 1521 11/19/18 1618   BP: 172/71 160/74   Pulse: 71    Resp: 20    Temp: 98.9 °F (37.2 °C)    TempSrc: Oral    SpO2: 100%    Weight: 139 lb 6.4 oz (63.2 kg)    Height: 5' 1\" (1.549 m)      Physical Exam:  Constitutional: well developed, well nourished, in no acute distress  Skin: warm and dry, normal tone and turgor  Head: normocephalic, atraumatic.    Eyes: sclera clear, EOMI  Neck: normal range of motion  Cardiovascular: normal S1, S2, regular rate and rhythm  Respiratory: clear to auscultation bilaterally with symmetrical effort  Extremities: full range of motion, normal gait  Neurology: normal strength and sensation  Psych: active, alert and oriented, affect appropriate       Diagnoses and all orders for this visit:    1. Essential hypertension    2. Seizure disorder ([facility]    3. Anemia, unspecified type    4. Complaints of weakness of lower extremity        I have discussed the diagnosis with the patient and the intended plan as seen in the above orders. Risks of uncontrolled HTN discussed at length with patient. These include, but are not limited to heart attack, stroke and sudden death. Patient expressed understanding. The patient has received an after-visit summary. The patient knows to call our office if there are any questions or concerns regarding diagnosis and treatment plans. I have discussed medication side effects and warnings with the patient as well. Over half of the 25 minutes face to face with Eden Garcia consisted of counseling and discussing treatment plans in reference to her uncontrolled HTN. Follow-up Disposition:  Return in about 4 months (around 3/19/2019), or if symptoms worsen or fail to improve.

## 2018-11-24 ENCOUNTER — HOSPITAL ENCOUNTER (EMERGENCY)
Age: 68
Discharge: HOME | End: 2018-11-24
Attending: EMERGENCY MEDICINE
Payer: MEDICARE

## 2018-11-24 VITALS
HEART RATE: 70 BPM | BODY MASS INDEX: 23.04 KG/M2 | DIASTOLIC BLOOD PRESSURE: 66 MMHG | OXYGEN SATURATION: 97 % | TEMPERATURE: 98.6 F | RESPIRATION RATE: 8 BRPM | SYSTOLIC BLOOD PRESSURE: 151 MMHG | HEIGHT: 63 IN | WEIGHT: 130 LBS

## 2018-11-24 DIAGNOSIS — F41.9 ANXIETY: ICD-10-CM

## 2018-11-24 DIAGNOSIS — R53.1 WEAKNESS: Primary | ICD-10-CM

## 2018-11-24 DIAGNOSIS — R53.83 FATIGUE, UNSPECIFIED TYPE: ICD-10-CM

## 2018-11-24 LAB
ALBUMIN SERPL-MCNC: 3.9 G/DL (ref 3.5–5)
ALBUMIN/GLOB SERPL: 0.9 {RATIO} (ref 1.1–2.2)
ALP SERPL-CCNC: 100 U/L (ref 45–117)
ALT SERPL-CCNC: 27 U/L (ref 12–78)
ANION GAP SERPL CALC-SCNC: 10 MMOL/L (ref 5–15)
APPEARANCE UR: CLEAR
AST SERPL-CCNC: 24 U/L (ref 15–37)
BACTERIA URNS QL MICRO: ABNORMAL /HPF
BASOPHILS # BLD: 0 K/UL (ref 0–0.1)
BASOPHILS NFR BLD: 1 % (ref 0–1)
BILIRUB SERPL-MCNC: 0.2 MG/DL (ref 0.2–1)
BILIRUB UR QL: NEGATIVE
BUN SERPL-MCNC: 13 MG/DL (ref 6–20)
BUN/CREAT SERPL: 13 (ref 12–20)
CALCIUM SERPL-MCNC: 9.7 MG/DL (ref 8.5–10.1)
CHLORIDE SERPL-SCNC: 101 MMOL/L (ref 97–108)
CO2 SERPL-SCNC: 28 MMOL/L (ref 21–32)
COLOR UR: ABNORMAL
COMMENT, HOLDF: NORMAL
CREAT SERPL-MCNC: 0.97 MG/DL (ref 0.55–1.02)
DIFFERENTIAL METHOD BLD: ABNORMAL
EOSINOPHIL # BLD: 0.1 K/UL (ref 0–0.4)
EOSINOPHIL NFR BLD: 2 % (ref 0–7)
EPITH CASTS URNS QL MICRO: ABNORMAL /LPF
ERYTHROCYTE [DISTWIDTH] IN BLOOD BY AUTOMATED COUNT: 15 % (ref 11.5–14.5)
GLOBULIN SER CALC-MCNC: 4.2 G/DL (ref 2–4)
GLUCOSE SERPL-MCNC: 112 MG/DL (ref 65–100)
GLUCOSE UR STRIP.AUTO-MCNC: NEGATIVE MG/DL
HCT VFR BLD AUTO: 38.1 % (ref 35–47)
HGB BLD-MCNC: 12.4 G/DL (ref 11.5–16)
HGB UR QL STRIP: NEGATIVE
HYALINE CASTS URNS QL MICRO: ABNORMAL /LPF (ref 0–5)
IMM GRANULOCYTES # BLD: 0 K/UL (ref 0–0.04)
IMM GRANULOCYTES NFR BLD AUTO: 0 % (ref 0–0.5)
KETONES UR QL STRIP.AUTO: NEGATIVE MG/DL
LEUKOCYTE ESTERASE UR QL STRIP.AUTO: ABNORMAL
LYMPHOCYTES # BLD: 1.3 K/UL (ref 0.8–3.5)
LYMPHOCYTES NFR BLD: 22 % (ref 12–49)
MCH RBC QN AUTO: 30.2 PG (ref 26–34)
MCHC RBC AUTO-ENTMCNC: 32.5 G/DL (ref 30–36.5)
MCV RBC AUTO: 92.7 FL (ref 80–99)
MONOCYTES # BLD: 0.6 K/UL (ref 0–1)
MONOCYTES NFR BLD: 10 % (ref 5–13)
NEUTS SEG # BLD: 3.6 K/UL (ref 1.8–8)
NEUTS SEG NFR BLD: 65 % (ref 32–75)
NITRITE UR QL STRIP.AUTO: NEGATIVE
NRBC # BLD: 0 K/UL (ref 0–0.01)
NRBC BLD-RTO: 0 PER 100 WBC
PH UR STRIP: 7.5 [PH] (ref 5–8)
PLATELET # BLD AUTO: 323 K/UL (ref 150–400)
PMV BLD AUTO: 9.9 FL (ref 8.9–12.9)
POTASSIUM SERPL-SCNC: 3.3 MMOL/L (ref 3.5–5.1)
PROT SERPL-MCNC: 8.1 G/DL (ref 6.4–8.2)
PROT UR STRIP-MCNC: NEGATIVE MG/DL
RBC # BLD AUTO: 4.11 M/UL (ref 3.8–5.2)
RBC #/AREA URNS HPF: ABNORMAL /HPF (ref 0–5)
SAMPLES BEING HELD,HOLD: NORMAL
SODIUM SERPL-SCNC: 139 MMOL/L (ref 136–145)
SP GR UR REFRACTOMETRY: 1.01 (ref 1–1.03)
TROPONIN I SERPL-MCNC: <0.05 NG/ML
UR CULT HOLD, URHOLD: NORMAL
UROBILINOGEN UR QL STRIP.AUTO: 0.2 EU/DL (ref 0.2–1)
WBC # BLD AUTO: 5.6 K/UL (ref 3.6–11)
WBC URNS QL MICRO: ABNORMAL /HPF (ref 0–4)

## 2018-11-24 PROCEDURE — 81001 URINALYSIS AUTO W/SCOPE: CPT

## 2018-11-24 PROCEDURE — 84484 ASSAY OF TROPONIN QUANT: CPT

## 2018-11-24 PROCEDURE — 99285 EMERGENCY DEPT VISIT HI MDM: CPT

## 2018-11-24 PROCEDURE — 74011250637 HC RX REV CODE- 250/637: Performed by: EMERGENCY MEDICINE

## 2018-11-24 PROCEDURE — 80053 COMPREHEN METABOLIC PANEL: CPT

## 2018-11-24 PROCEDURE — 85025 COMPLETE CBC W/AUTO DIFF WBC: CPT

## 2018-11-24 PROCEDURE — 36415 COLL VENOUS BLD VENIPUNCTURE: CPT

## 2018-11-24 RX ORDER — SODIUM CHLORIDE 0.9 % (FLUSH) 0.9 %
5-10 SYRINGE (ML) INJECTION AS NEEDED
Status: DISCONTINUED | OUTPATIENT
Start: 2018-11-24 | End: 2018-11-24 | Stop reason: HOSPADM

## 2018-11-24 RX ORDER — LORAZEPAM 0.5 MG/1
0.5 TABLET ORAL
Status: COMPLETED | OUTPATIENT
Start: 2018-11-24 | End: 2018-11-24

## 2018-11-24 RX ORDER — SODIUM CHLORIDE 0.9 % (FLUSH) 0.9 %
5-10 SYRINGE (ML) INJECTION EVERY 8 HOURS
Status: DISCONTINUED | OUTPATIENT
Start: 2018-11-24 | End: 2018-11-24 | Stop reason: HOSPADM

## 2018-11-24 RX ORDER — LORAZEPAM 0.5 MG/1
0.5 TABLET ORAL
Qty: 5 TAB | Refills: 0 | Status: SHIPPED | OUTPATIENT
Start: 2018-11-24 | End: 2019-01-28

## 2018-11-24 RX ADMIN — LORAZEPAM 0.5 MG: 0.5 TABLET ORAL at 12:18

## 2018-11-24 NOTE — ED NOTES
The patient was given complete discharge instructions by Dr. Contreras Toth, the IV lock ws removed and the patient was discharged home with family.

## 2018-11-24 NOTE — DISCHARGE INSTRUCTIONS
Fatigue: Care Instructions  Your Care Instructions    Fatigue is a feeling of tiredness, exhaustion, or lack of energy. You may feel fatigue because of too much or not enough activity. It can also come from stress, lack of sleep, boredom, and poor diet. Many medical problems, such as viral infections, can cause fatigue. Emotional problems, especially depression, are often the cause of fatigue. Fatigue is most often a symptom of another problem. Treatment for fatigue depends on the cause. For example, if you have fatigue because you have a certain health problem, treating this problem also treats your fatigue. If depression or anxiety is the cause, treatment may help. Follow-up care is a key part of your treatment and safety. Be sure to make and go to all appointments, and call your doctor if you are having problems. It's also a good idea to know your test results and keep a list of the medicines you take. How can you care for yourself at home? · Get regular exercise. But don't overdo it. Go back and forth between rest and exercise. · Get plenty of rest.  · Eat a healthy diet. Do not skip meals, especially breakfast.  · Reduce your use of caffeine, tobacco, and alcohol. Caffeine is most often found in coffee, tea, cola drinks, and chocolate. · Limit medicines that can cause fatigue. This includes tranquilizers and cold and allergy medicines. When should you call for help? Watch closely for changes in your health, and be sure to contact your doctor if:    · You have new symptoms such as fever or a rash.     · Your fatigue gets worse.     · You have been feeling down, depressed, or hopeless. Or you may have lost interest in things that you usually enjoy.     · You are not getting better as expected. Where can you learn more? Go to http://caprice-carlos.info/. Enter H896 in the search box to learn more about \"Fatigue: Care Instructions. \"  Current as of: November 20, 2017  Content Version: 11.8  © 9358-0668 Healthwise, Incorporated. Care instructions adapted under license by [employer] (which disclaims liability or warranty for this information). If you have questions about a medical condition or this instruction, always ask your healthcare professional. [address] any warranty or liability for your use of this information. Weakness: Care Instructions  Your Care Instructions    Weakness is a lack of physical or muscle strength. You may feel that you need to make extra effort to move your arms, legs, or other muscles. Generalized weakness means that you feel weak in most areas of your body. Another type of weakness may affect just one muscle or group of muscles. You may feel weak and tired after you have done too much activity, such as taking an extra-long hike. This is not a serious problem. It often goes away on its own. Feeling weak can also be caused by medical conditions like thyroid problems, depression, or a virus. Sometimes the cause can be serious. Your doctor may want to do more tests to try to find the cause of the weakness. The doctor has checked you carefully, but problems can develop later. If you notice any problems or new symptoms, get medical treatment right away. Follow-up care is a key part of your treatment and safety. Be sure to make and go to all appointments, and call your doctor if you are having problems. It's also a good idea to know your test results and keep a list of the medicines you take. How can you care for yourself at home? · Rest when you feel tired. · Be safe with medicines. If your doctor prescribed medicine, take it exactly as prescribed. Call your doctor if you think you are having a problem with your medicine. You will get more details on the specific medicines your doctor prescribes. · Do not skip meals. Eating a balanced diet may increase your energy level.   · Get some physical activity every day, but do not get too tired. When should you call for help? Call your doctor now or seek immediate medical care if:    · You have new or worse weakness.     · You are dizzy or lightheaded, or you feel like you may faint.    Watch closely for changes in your health, and be sure to contact your doctor if:    · You do not get better as expected. Where can you learn more? Go to http://caprice-carlos.info/. Enter 473 [id number] [id number] in the search box to learn more about \"Weakness: Care Instructions. \"  Current as of: November 20, 2017  Content Version: 11.8  © 4707-5793 [employer]. Care instructions adapted under license by [employer] (which disclaims liability or warranty for this information). If you have questions about a medical condition or this instruction, always ask your healthcare professional. [address] any warranty or liability for your use of this information.

## 2018-11-24 NOTE — ED NOTES
Complains of leg and arm weakness since November 5th. States weakness is short in duration. Also states she has experienced some numbness in her hands.

## 2018-11-24 NOTE — ED TRIAGE NOTES
Patient presents with 3-week history of weakness in all 4 extremities. \"Feels like there's no strength. \" Patient also reports numbness in both hands that has been ongoing -- MRI recommended by PCP.

## 2018-11-24 NOTE — ED PROVIDER NOTES
77 y.o. female with past medical history significant for seizure and essential HTN who presents from home with chief complaint of fatigue. Pt reports at times she has been becoming fatigued and SOB w/ minimal exertion for ~3 weeks. She also c/o urinary frequency with occasional weakness in her bilateral legs and \"sometimes in (her) arms\" which lasts for 1 hour per episode. Last episode of weakness was shortly PTA. Per Pt's son, Pt usually walks 3/4 of a mile daily, so her fatigue w/ exertion is unusual. She has still been able to do this at times since weakness began. Pt denies known exacerbating factors. Per Pt's son, Pt has a neurologist d/t prior spinal problems; her neurologist recently ordered an MRI d/t concern about possible pinched nerve. Pt denies fever, cough, and N/V/D. There are no other acute medical concerns at this time. PCP: Shira Delgado MD 
 
Note written by Lorena Rutherford, as dictated by Parish Cadet MD 10:05 AM 
 
 
 
  
 
Past Medical History:  
Diagnosis Date  Essential hypertension 11/2/2016  Seizure ([facility]  Seizures ([facility] No past surgical history on file. Family History:  
Problem Relation Age of Onset  Stroke Father  Heart Disease Sister Social History Socioeconomic History  Marital status:  Spouse name: Not on file  Number of children: Not on file  Years of education: Not on file  Highest education level: Not on file Social Needs  Financial resource strain: Not on file  Food insecurity - worry: Not on file  Food insecurity - inability: Not on file  Transportation needs - medical: Not on file  Transportation needs - non-medical: Not on file Occupational History  Not on file Tobacco Use  Smoking status: Never Smoker  Smokeless tobacco: Never Used Substance and Sexual Activity  Alcohol use: No  
 Drug use: No  
 Sexual activity: Yes  
  Partners: Male Other Topics Concern  Not on file Social History Narrative  Not on file ALLERGIES: Patient has no known allergies. Review of Systems Constitutional: Positive for fatigue. Negative for fever. Respiratory: Positive for shortness of breath. Negative for cough. Gastrointestinal: Negative for diarrhea, nausea and vomiting. Genitourinary: Positive for frequency. Neurological: Positive for weakness. All other systems reviewed and are negative. Vitals:  
 11/24/18 0951 11/24/18 1015 11/24/18 1100 BP: (!) 131/92 182/86 145/63 Pulse: 96 92 69 Resp: 16 24 14 Temp: 98.6 °F (37 °C) SpO2: 98% 100% 100% Weight: 59 kg (130 lb) Height: 5' 3\" (1.6 m) Physical Exam  
Constitutional: She is oriented to person, place, and time. She appears well-developed and well-nourished. No distress. HENT:  
Head: Normocephalic and atraumatic. Eyes: Conjunctivae are normal. No scleral icterus. Neck: Neck supple. No tracheal deviation present. Cardiovascular: Normal rate, regular rhythm, normal heart sounds and intact distal pulses. Exam reveals no gallop and no friction rub. No murmur heard. Pulmonary/Chest: Effort normal and breath sounds normal. She has no wheezes. She has no rales. Abdominal: Soft. She exhibits no distension. There is no tenderness. There is no rebound and no guarding. Musculoskeletal: She exhibits no edema. Neurological: She is alert and oriented to person, place, and time. Strength and sensation normal in all extremities. Skin: Skin is warm and dry. No rash noted. Psychiatric: She has a normal mood and affect. Nursing note and vitals reviewed. Note written by Lorena Nelson, as dictated by Mary Izquierdo MD 10:05 AM 
 
[facility] Procedures Progress Note: 
Results, treatment, and follow up plan have been discussed with patient/family. Questions were answered.   
Macy Jacobs MD 
 
 A/P: intermittent bilateral extremity weakness for the past few weeks; normal strength in between episodes; has seen neuro and is scheduled for an MRI; family questions whether anxiety could be cause; I think it could; reassuring appearance and exam; VSS; CBC, CMP ok; home with limited Ativan and PCP,neuro f/u.   Shoshana Mcfadden MD

## 2018-11-27 ENCOUNTER — PATIENT OUTREACH (OUTPATIENT)
Dept: FAMILY MEDICINE CLINIC | Age: 68
End: 2018-11-27

## 2018-11-27 NOTE — PROGRESS NOTES
NN unable to reach patient by phone to complete ED follow up. Voicemail left requesting return call.

## 2018-11-30 ENCOUNTER — PATIENT OUTREACH (OUTPATIENT)
Dept: FAMILY MEDICINE CLINIC | Age: 68
End: 2018-11-30

## 2018-11-30 RX ORDER — HYDROCHLOROTHIAZIDE 12.5 MG/1
12.5 TABLET ORAL DAILY
COMMUNITY
End: 2018-12-20 | Stop reason: SDUPTHER

## 2018-11-30 NOTE — PROGRESS NOTES
2nd attempt to complete ED follow up. Will mail letter today. Patient returned call: 
ED Discharge Follow-Up Date/Time:     2018 10:50 AM 
 
Patient presented to [place] [address]  ED on 2018 and was diagnosed with weakness, fatigue, anxiety. Top Challenges reviewed with the provider  
none Method of communication with provider :none Nurse Navigator(NN) contacted the  patient  by telephone to perform post ED discharge assessment. Verified name and  with patient as identifiers. Provided introduction to self, and explanation of the Nurse Navigator role. Patient reported assessment: \"I'm doing okay. It just comes on all of a sudden\". Medication(s):  
New Medications at Discharge: Ativan Changed Medications at Discharge: none Discontinued Medications at Discharge: none There were no barriers to obtaining medications identified at this time. Reviewed discharge instructions and red flags with  patient who voiced understanding. Patient given an opportunity to ask questions and does not have any further questions or concerns at this time. The patient agrees to contact the PCP office for questions related to their healthcare. Patient reminded that there are physicians on call 24 hours a day / 7 days a week (M-F 5pm to 8am and from Friday 5pm until Monday 8am for the weekend) should the patient have questions or concerns. NN provided contact information for future reference. Offered follow up appointment with PCP: yes, patient will call and schedule after MRI on 18 [facility] follow up appointment(s):  
Future Appointments Date Time Provider Ryan Andrea 2018  9:30 AM [facility] follow up appointment(s): Happy Inspector:  n/a  
 www. [facility] 9 AM- 9 PM.   Phone [phone] Goals  Attends follow-up appointments as directed. 2018: PCP 2018

## 2018-11-30 NOTE — LETTER
11/30/2018 10:44 AM 
 
Ms. Loida Corrigan [address] [address] I have been unable to reach you by telephone. I am the  Nurse Navigator working with your physician's office. Part of my job is to follow up with patients who have been in the emergency department  to see how they are feeling, answer any questions they may have about their visit and also make sure they have a follow-up appointment to see their primary care doctor or specialist.   
 
Please call me so we may discuss your emergency room visit and review your medical status. I can be reached Monday thru Friday at the telephone number listed above. Thank you, Tabatha Ponce RN

## 2018-12-13 ENCOUNTER — HOSPITAL ENCOUNTER (OUTPATIENT)
Dept: MRI IMAGING | Age: 68
Discharge: HOME | End: 2018-12-13
Attending: PHYSICAL MEDICINE & REHABILITATION
Payer: MEDICARE

## 2018-12-13 DIAGNOSIS — M47.816 LUMBAR SPONDYLOSIS: ICD-10-CM

## 2018-12-13 DIAGNOSIS — M48.062 PSEUDOCLAUDICATION SYNDROME: ICD-10-CM

## 2018-12-13 PROCEDURE — 72148 MRI LUMBAR SPINE W/O DYE: CPT

## 2018-12-18 ENCOUNTER — PATIENT OUTREACH (OUTPATIENT)
Dept: FAMILY MEDICINE CLINIC | Age: 68
End: 2018-12-18

## 2018-12-18 NOTE — PROGRESS NOTES
NN received return call from patient. Patient states she followed up with ortho today on MRI results. Would now like to schedule PCP appt. Made for 12/20/2018.

## 2018-12-20 ENCOUNTER — OFFICE VISIT (OUTPATIENT)
Dept: FAMILY MEDICINE CLINIC | Age: 68
End: 2018-12-20

## 2018-12-20 VITALS
HEART RATE: 64 BPM | BODY MASS INDEX: 24.27 KG/M2 | SYSTOLIC BLOOD PRESSURE: 160 MMHG | DIASTOLIC BLOOD PRESSURE: 81 MMHG | TEMPERATURE: 98.2 F | OXYGEN SATURATION: 98 % | WEIGHT: 137 LBS | RESPIRATION RATE: 16 BRPM | HEIGHT: 63 IN

## 2018-12-20 DIAGNOSIS — R60.0 PEDAL EDEMA: ICD-10-CM

## 2018-12-20 DIAGNOSIS — R00.2 PALPITATIONS: Primary | ICD-10-CM

## 2018-12-20 DIAGNOSIS — R07.89 CHEST TIGHTNESS OR PRESSURE: ICD-10-CM

## 2018-12-20 DIAGNOSIS — R29.898 COMPLAINTS OF WEAKNESS OF LOWER EXTREMITY: ICD-10-CM

## 2018-12-20 DIAGNOSIS — I51.7 LEFT VENTRICULAR HYPERTROPHY: ICD-10-CM

## 2018-12-20 DIAGNOSIS — I10 ESSENTIAL HYPERTENSION: ICD-10-CM

## 2018-12-20 DIAGNOSIS — E78.00 PURE HYPERCHOLESTEROLEMIA: ICD-10-CM

## 2018-12-20 RX ORDER — METOPROLOL SUCCINATE 25 MG/1
25 TABLET, EXTENDED RELEASE ORAL DAILY
Qty: 30 TAB | Refills: 0 | Status: SHIPPED | OUTPATIENT
Start: 2018-12-20 | End: 2019-01-28

## 2018-12-20 RX ORDER — ATORVASTATIN CALCIUM 20 MG/1
20 TABLET, FILM COATED ORAL DAILY
Qty: 30 TAB | Refills: 2 | Status: SHIPPED | OUTPATIENT
Start: 2018-12-20 | End: 2019-08-19

## 2018-12-20 RX ORDER — HYDROCHLOROTHIAZIDE 12.5 MG/1
25 TABLET ORAL DAILY
Qty: 30 TAB | Refills: 2 | Status: SHIPPED | OUTPATIENT
Start: 2018-12-20 | End: 2019-02-11 | Stop reason: SDUPTHER

## 2018-12-20 NOTE — PROGRESS NOTES
1. Have you been to the ER, urgent care clinic since your last visit? Hospitalized since your last visit? No    2. Have you seen or consulted any other health care providers outside of the [address] [place] [address] since your last visit? Include any pap smears or colon screening. No  Reviewed record in preparation for visit and have necessary documentation  Pt did not bring medication to office visit for review  opportunity was given for questions  Goals that were addressed and/or need to be completed during or after this appointment include  There are no preventive care reminders to display for this patient.

## 2018-12-20 NOTE — PROGRESS NOTES
Chief Complaint   Patient presents with   [address] [facility] Follow Up     weakness     she is a 77y.o. year old female who presents for follow up of episodes of chest tightness, palpitations and LE weakness. Patient with recent ED encounter at [address]. She has had several of these episodes. EKG has shown left ventricular hypertrophy. Patient with hx of uncontrolled HTN, HLD, anemia and seizure disorder. She has been unwilling to increase or add medication to control her BP. After lengthy discussion of her CV risks and need for cardiology evaluation she has agreed to start new medications today. BP Readings from Last 3 Encounters:   02/14/19 175/67   01/28/19 177/73   01/14/19 183/64     Wt Readings from Last 3 Encounters:   02/14/19 133 lb (60.3 kg)   01/28/19 131 lb (59.4 kg)   01/14/19 132 lb (59.9 kg)     Body mass index is 24.27 kg/m². Patient advised to log blood pressures at home daily and bring to next visit. Call office as soon as possible if BP's over 140/90 on multiple occasions or with symptoms of dizziness, chest pain, shortness of breath, headache or ankle swelling. Our goal is to normalize the blood pressure to decrease the risks of strokes and heart attacks. The patient is in agreement with the plan. Patient Active Problem List   Diagnosis Code    [facility] G[facility]    Essential hypertension I10    Primary open angle glaucoma H40.1190    Glaucoma, open angle, severe stage H40.10X3    Age-related nuclear cataract of both eyes H25.13     No past surgical history on file.   Social History     Socioeconomic History    Marital status:      Spouse name: Not on file    Number of children: Not on file    Years of education: Not on file    Highest education level: Not on file   Social Needs    Financial resource strain: Not on file    Food insecurity - worry: Not on file    Food insecurity - inability: Not on file    Transportation needs - medical: Not on file   Rolando Spivey Transportation needs - non-medical: Not on file   Occupational History    Not on file   Tobacco Use    Smoking status: Never Smoker    Smokeless tobacco: Never Used   Substance and Sexual Activity    Alcohol use: Yes    Drug use: No    Sexual activity: Yes     Partners: Male   Other Topics Concern    Not on file   Social History Narrative    Not on file     Family History   Problem Relation Age of Onset    Stroke Father     Heart Disease Sister      Current Outpatient Medications   Medication Sig    atorvastatin (LIPITOR) 20 mg tablet Take 1 Tab by mouth daily.  timolol (TIMOPTIC) 0.5 % ophthalmic solution 1 Drop two (2) times a day.  phenytoin ER (DILANTIN ER) 100 mg ER capsule TAKE 3 CAPSULES BY MOUTH AT BEDTIME    aspirin delayed-release 81 mg tablet Take 1 Tab by mouth daily.  hydroCHLOROthiazide (HYDRODIURIL) 25 mg tablet Take 1 Tab by mouth daily.  lisinopril (PRINIVIL, ZESTRIL) 10 mg tablet Take 1 Tab by mouth daily. No current facility-administered medications for this visit.       No Known Allergies    Review of Systems:  Constitutional: Negative for fatigue or malaise  Derm: Negative for rash or lesion  HEENT: Negative for acute hearing or vision changes  Cardiovascular: Positive for dizziness, chest tightness and palpitations  Respiratory: Negative for cough, wheezing or SOB  Gastreintestinal: Negative for nausea or abdominal pain  Genital/urinary: Negative for dysuria or voiding dysfunction  Muscoloskeletal: Negative for acute myalgias or arthralgias   Neurological: see HPI, Negative for headache or paresthesia  Psychological: Negative for hx of depression or anxiety      Vitals:    12/20/18 0917   BP: 160/81   Pulse: 64   Resp: 16   Temp: 98.2 °F (36.8 °C)   TempSrc: Oral   SpO2: 98%   Weight: 137 lb (62.1 kg)   Height: 5' 3\" (1.6 m)     Physical Exam:  Constitutional: well developed, well nourished, in no acute distress  Skin: warm and dry, normal tone and turgor  Head: normocephalic, atraumatic. Eyes: sclera clear, EOMI  Neck: normal range of motion  Cardiovascular: normal S1, S2, regular rate and rhythm, no murmur, trace pedal edema  Respiratory: clear to auscultation bilaterally with symmetrical effort  Extremities: full range of motion, normal gait  Neurology: normal strength and sensation  Psych: active, alert and oriented, affect appropriate       Diagnoses and all orders for this visit:    1. Palpitations  -     REFERRAL TO CARDIOLOGY  -     MAGNESIUM  -     METABOLIC PANEL, COMPREHENSIVE  -     TSH 3RD GENERATION  -     BNP    2. Chest tightness or pressure  -     BNP    3. Essential hypertension  -     REFERRAL TO CARDIOLOGY  -     MAGNESIUM  -     METABOLIC PANEL, COMPREHENSIVE  -     BNP    4. Pedal edema  -     BNP    5. Complaints of weakness of lower extremity  -     BNP    6. Pure hypercholesterolemia  -     atorvastatin (LIPITOR) 20 mg tablet; Take 1 Tab by mouth daily.  -     REFERRAL TO CARDIOLOGY  -     LIPID PANEL  -     METABOLIC PANEL, COMPREHENSIVE  -     BNP    7. Left ventricular hypertrophy  -     REFERRAL TO CARDIOLOGY  -     BNP        I have discussed the diagnosis with the patient and the intended plan as seen in the above orders. Risks of uncontrolled HTN discussed at length with patient. These include, but are not limited to heart attack, stroke and sudden death. Signs and symptoms of AMI, TIA or CVA discussed with patient with instructions to call 911 if these occur. Patient expressed understanding. The patient has received an after-visit summary. The patient knows to call our office if there are any questions or concerns regarding diagnosis and treatment plans. I have discussed medication side effects and warnings with the patient as well.   Over half of the 40 minutes face to face with Jalyn Most consisted of counseling and discussing treatment plans in reference to her episodes of palpitation and weakness, uncontrolled HTN, HLD, CV risk factors and need for cariology evaluation. .    Follow-up Disposition:  Return in about 2 weeks (around 1/3/2019).

## 2018-12-20 NOTE — PATIENT INSTRUCTIONS
Learning About High Blood Pressure  What is high blood pressure? Blood pressure is a measure of how hard the blood pushes against the walls of your arteries. It's normal for blood pressure to go up and down throughout the day, but if it stays up, you have high blood pressure. Another name for high blood pressure is hypertension. Two numbers tell you your blood pressure. The first number is the systolic pressure. It shows how hard the blood pushes when your heart is pumping. The second number is the diastolic pressure. It shows how hard the blood pushes between heartbeats, when your heart is relaxed and filling with blood. A blood pressure of less than 120/80 (say \"120 over 80\") is ideal for an adult. High blood pressure is 130/80 or higher. You have high blood pressure if your top number is 130 or higher or your bottom number is 80 or higher, or both. What happens when you have high blood pressure? · Blood flows through your arteries with too much force. Over time, this damages the walls of your arteries. But you can't feel it. High blood pressure usually doesn't cause symptoms. · Fat and calcium start to build up in your arteries. This buildup is called plaque. Plaque makes your arteries narrower and stiffer. Blood can't flow through them as easily. · This lack of good blood flow starts to damage some of the organs in your body. This can lead to problems such as coronary artery disease and heart attack, heart failure, stroke, kidney failure, and eye damage. How can you prevent high blood pressure? · Stay at a healthy weight. · Try to limit how much sodium you eat to less than 2,300 milligrams (mg) a day. If you limit your sodium to 1,500 mg a day, you can lower your blood pressure even more. ? Buy foods that are labeled \"unsalted,\" \"sodium-free,\" or \"low-sodium. \" Foods labeled \"reduced-sodium\" and \"light sodium\" may still have too much sodium. ?  Flavor your food with garlic, lemon juice, onion, vinegar, herbs, and spices instead of salt. Do not use soy sauce, steak sauce, onion salt, garlic salt, mustard, or ketchup on your food. ? Use less salt (or none) when recipes call for it. You can often use half the salt a recipe calls for without losing flavor. · Be physically active. Get at least 30 minutes of exercise on most days of the week. Walking is a good choice. You also may want to do other activities, such as running, swimming, cycling, or playing tennis or team sports. · Limit alcohol to 2 drinks a day for men and 1 drink a day for women. · Eat plenty of fruits, vegetables, and low-fat dairy products. Eat less saturated and total fats. How is high blood pressure treated? · Your doctor will suggest making lifestyle changes. For example, your doctor may ask you to eat healthy foods, quit smoking, lose extra weight, and be more active. · If lifestyle changes don't help enough, your doctor may recommend that you take medicine. · When blood pressure is very high, medicines are needed to lower it. Follow-up care is a key part of your treatment and safety. Be sure to make and go to all appointments, and call your doctor if you are having problems. It's also a good idea to know your test results and keep a list of the medicines you take. Where can you learn more? Go to http://caprice-carlos.info/. Enter P501 in the search box to learn more about \"Learning About High Blood Pressure. \"  Current as of: December 6, 2017  Content Version: 11.8  © 5148-9186 [employer]. Care instructions adapted under license by [employer] (which disclaims liability or warranty for this information). If you have questions about a medical condition or this instruction, always ask your healthcare professional. [address] any warranty or liability for your use of this information.

## 2018-12-21 LAB
ALBUMIN SERPL-MCNC: 4.5 G/DL (ref 3.5–4.8)
ALBUMIN/GLOB SERPL: 1.4 {RATIO} (ref 1.2–2.2)
ALP SERPL-CCNC: 90 IU/L (ref 39–117)
ALT SERPL-CCNC: 26 IU/L (ref 0–32)
AST SERPL-CCNC: 26 IU/L (ref 0–40)
BILIRUB SERPL-MCNC: 0.2 MG/DL (ref 0–1.2)
BNP SERPL-MCNC: 78.5 PG/ML (ref 0–100)
BUN SERPL-MCNC: 15 MG/DL (ref 8–27)
BUN/CREAT SERPL: 17 (ref 12–28)
CALCIUM SERPL-MCNC: 10 MG/DL (ref 8.7–10.3)
CHLORIDE SERPL-SCNC: 97 MMOL/L (ref 96–106)
CHOLEST SERPL-MCNC: 298 MG/DL (ref 100–199)
CO2 SERPL-SCNC: 27 MMOL/L (ref 20–29)
CREAT SERPL-MCNC: 0.9 MG/DL (ref 0.57–1)
GLOBULIN SER CALC-MCNC: 3.2 G/DL (ref 1.5–4.5)
GLUCOSE SERPL-MCNC: 85 MG/DL (ref 65–99)
HDLC SERPL-MCNC: 111 MG/DL
LDLC SERPL CALC-MCNC: 162 MG/DL (ref 0–99)
MAGNESIUM SERPL-MCNC: 2.3 MG/DL (ref 1.6–2.3)
POTASSIUM SERPL-SCNC: 3.8 MMOL/L (ref 3.5–5.2)
PROT SERPL-MCNC: 7.7 G/DL (ref 6–8.5)
SODIUM SERPL-SCNC: 142 MMOL/L (ref 134–144)
TRIGL SERPL-MCNC: 124 MG/DL (ref 0–149)
TSH SERPL DL<=0.005 MIU/L-ACNC: 0.94 UIU/ML (ref 0.45–4.5)
VLDLC SERPL CALC-MCNC: 25 MG/DL (ref 5–40)

## 2018-12-26 ENCOUNTER — OFFICE VISIT (OUTPATIENT)
Dept: CARDIOLOGY CLINIC | Age: 68
End: 2018-12-26

## 2018-12-26 ENCOUNTER — CLINICAL SUPPORT (OUTPATIENT)
Dept: CARDIOLOGY CLINIC | Age: 68
End: 2018-12-26

## 2018-12-26 VITALS
DIASTOLIC BLOOD PRESSURE: 80 MMHG | BODY MASS INDEX: 23.85 KG/M2 | SYSTOLIC BLOOD PRESSURE: 152 MMHG | RESPIRATION RATE: 18 BRPM | HEIGHT: 63 IN | HEART RATE: 57 BPM | WEIGHT: 134.6 LBS

## 2018-12-26 DIAGNOSIS — R07.89 CHEST TIGHTNESS: ICD-10-CM

## 2018-12-26 DIAGNOSIS — I10 HTN (HYPERTENSION), MALIGNANT: ICD-10-CM

## 2018-12-26 DIAGNOSIS — I49.9 IRREGULAR HEART RATE: ICD-10-CM

## 2018-12-26 DIAGNOSIS — R06.02 SOB (SHORTNESS OF BREATH): ICD-10-CM

## 2018-12-26 DIAGNOSIS — I10 HTN (HYPERTENSION), MALIGNANT: Primary | ICD-10-CM

## 2018-12-26 DIAGNOSIS — I10 ESSENTIAL HYPERTENSION: ICD-10-CM

## 2018-12-26 NOTE — PROGRESS NOTES
Tesha March     1950       Junior Titus MD, [facility]  Date of Visit-12/26/2018   PCP is Guerline Devi MD   [facility] [place]  [facility]  HPI:  Tesha March is a 77 y.o. female   New pt referred by Dr. Mariely Najera in [place] for Palpitations and HTN. No known cardiac hx but there was an EKG in 2012 that showed LVH. She's had difficulty to control HTN and has been hesitant to change medications. She's currently on thiazide and BB. Went to the ED 11/24/18 in with weakness. Her BP was elevated. Lab work was unrevealing. I see her  as a patient    Overall the pt states she is doing well. She is accompanied by 4 other people ( and 3 children) during today's appointment. Pt experiences SOB when she's walking short-distance but this gets better when she walks longer distance. Pt does not experience any dizziness. Pt is complaining of a chest squeezing and tightness sensation when sitting down and at rest.  Pt adds that the sensation is light pounding-like. Pt also experiences some fatigue that she feels it's a new sx. Pt denies having any headache. Pt is also not sleeping well at night along with some stress. Pt's family also asked about what to do with pt's anxiety. Denies edema, syncope or shortness of breath at rest, has no tachycardia, or sense of arrhythmia. EKG: Sinus bradycardia with occasional PACs and ST depression and LV with strain. Assessment/Plan:     1. Malignant HTN. Many components could be contributing including her back spine issues anxiety difficulty sleeping and baseline HTN. We've educated her the need to treat HTN to avoid stroke, heart attack and kidney failure. She's aware that she has LVH means she has thickening of the heart already. I suspect we will add amlodipine and later ACE. Her family is present and very helpful and they're aware that her BP goal is to be below 135/85.       2. Chest tightness with exertion some typical and other atypical features of CAD. Plan exercise nuclear. 3. Palpitations. PAC's likely based on EKG. 4. ADORNO. Plan echo. 5. Keep BP diary get both tests and follow up 1/9 in [facility]. 6. She has anxiety and difficulty maintaining sleep I'll have her f/u with Dr. Aracelis Elder if no cardiac source is found. Future Appointments   Date Time Provider Ryan Andrea   1/3/2019  9:00 AM Victor Hugo Mendez MD [facility] K[facility] SCHED   1/9/2019 11:00 AM Flory Hobbs MD [facility] SCH[facility]        [place] [facility] CHF Meds             hydroCHLOROthiazide (HYDRODIURIL) 12.5 mg tablet  (Taking) Take 2 Tabs by mouth daily. atorvastatin (LIPITOR) 20 mg tablet  (Taking) Take 1 Tab by mouth daily. metoprolol succinate (TOPROL-XL) 25 mg XL tablet  (Taking) Take 1 Tab by mouth daily. aspirin delayed-release 81 mg tablet  (Taking) Take 1 Tab by mouth daily. aspirin (ASPIRIN) 325 mg tablet Take 1 Tab by mouth daily. Impression:   1. HTN (hypertension), malignant    2. Chest tightness    3. Irregular heart rate    4. SOB (shortness of breath)         No early CAD in family  Non smoker    Review of Systems   Constitutional: Negative for diaphoresis, fever and malaise/fatigue. HENT: Negative for ear pain, hearing loss, nosebleeds and tinnitus. Eyes: Negative for blurred vision, double vision and pain. Respiratory: Positive for shortness of breath. Negative for cough, hemoptysis, sputum production, wheezing and stridor. Cardiovascular: Positive for chest pain and palpitations. Negative for orthopnea, claudication and leg swelling. Gastrointestinal: Positive for constipation and heartburn. Negative for abdominal pain, blood in stool, diarrhea, melena, nausea and vomiting. Genitourinary: Negative for dysuria, frequency and urgency. Musculoskeletal: Positive for joint pain. Negative for back pain, falls, myalgias and neck pain. Skin: Negative for rash. Neurological: Positive for tremors and sensory change. Negative for dizziness, seizures, loss of consciousness, weakness and headaches. Endo/Heme/Allergies: Does not bruise/bleed easily. Psychiatric/Behavioral: Negative for depression, hallucinations and memory loss. The patient is nervous/anxious and has insomnia. see supplement sheet, initialed and to be scanned by staff  Past Medical History:   Diagnosis Date    Essential hypertension 11/2/2016    Seizure ([address]     Seizures ([address]       Social Hx= reports that  has never smoked. she has never used smokeless tobacco. She reports that she drinks alcohol. She reports that she does not use drugs. Exam and Labs:  /80 (BP 1 Location: Left arm)   Pulse (!) 57   Resp 18   Ht 5' 3\" (1.6 m)   Wt 134 lb 9.6 oz (61.1 kg)   BMI 23.84 kg/m² Constitutional:  NAD, comfortable  Head: NC,AT. Eyes: No scleral icterus. Neck:  Neck supple. No JVD present. Throat: moist mucous membranes. Chest: Effort normal & normal respiratory excursion . Neurological: alert, conversant and oriented . Skin: Skin is not cold. No obvious systemic rash noted. Not diaphoretic. No erythema. Psychiatric:  Grossly normal mood and affect. Behavior appears normal. Extremities:  no clubbing or cyanosis. Abdomen: non distended    Lungs:breath sounds normal. No stridor. distress, wheezes or  Rales. Heart: occasional rare ectopy normal rate, regular rhythm, normal S1, S2, no murmurs, rubs, clicks or gallops , PMI non displaced. Edema: Edema is none.   Lab Results   Component Value Date/Time    Cholesterol, total 298 (H) 12/20/2018 03:39 PM    HDL Cholesterol 111 12/20/2018 03:39 PM    LDL, calculated 162 (H) 12/20/2018 03:39 PM    Triglyceride 124 12/20/2018 03:39 PM     Lab Results   Component Value Date/Time    Sodium 142 12/20/2018 03:39 PM    Potassium 3.8 12/20/2018 03:39 PM    Chloride 97 12/20/2018 03:39 PM    CO2 27 12/20/2018 03:39 PM    Anion gap 10 11/24/2018 10:17 AM Glucose 85 12/20/2018 03:39 PM    BUN 15 12/20/2018 03:39 PM    Creatinine 0.90 12/20/2018 03:39 PM    BUN/Creatinine ratio 17 12/20/2018 03:39 PM    GFR est AA 75 12/20/2018 03:39 PM    GFR est non-AA 65 12/20/2018 03:39 PM    Calcium 10.0 12/20/2018 03:39 PM      Wt Readings from Last 3 Encounters:   12/26/18 134 lb 9.6 oz (61.1 kg)   12/20/18 137 lb (62.1 kg)   11/24/18 130 lb (59 kg)      BP Readings from Last 3 Encounters:   12/26/18 152/80   12/20/18 160/81   11/24/18 151/66      Current Outpatient Medications   Medication Sig    hydroCHLOROthiazide (HYDRODIURIL) 12.5 mg tablet Take 2 Tabs by mouth daily.  atorvastatin (LIPITOR) 20 mg tablet Take 1 Tab by mouth daily.  metoprolol succinate (TOPROL-XL) 25 mg XL tablet Take 1 Tab by mouth daily.  timolol (TIMOPTIC) 0.5 % ophthalmic solution 1 Drop two (2) times a day.  phenytoin ER (DILANTIN ER) 100 mg ER capsule TAKE 3 CAPSULES BY MOUTH AT BEDTIME    aspirin delayed-release 81 mg tablet Take 1 Tab by mouth daily.  LORazepam (ATIVAN) 0.5 mg tablet Take 1 Tab by mouth every eight (8) hours as needed for Anxiety. Max Daily Amount: 1.5 mg.    diclofenac EC (VOLTAREN) 25 mg EC tablet Take  by mouth two (2) times a day.  cyclobenzaprine (FLEXERIL) 10 mg tablet Take  by mouth three (3) times daily as needed for Muscle Spasm(s).  aspirin (ASPIRIN) 325 mg tablet Take 1 Tab by mouth daily. No current facility-administered medications for this visit. Impression see above.       Written by Anna Jama, as dictated by Lidia Chan MD.

## 2018-12-26 NOTE — PROGRESS NOTES
Visit Vitals  /80 (BP 1 Location: Left arm)   Pulse (!) 57   Resp 18   Ht 5' 3\" (1.6 m)   Wt 134 lb 9.6 oz (61.1 kg)   BMI 23.84 kg/m²       New patient. Referred for chest tightness, SOB, weakness and palpitations. Recently in [facility] ER for same symptoms.

## 2018-12-28 NOTE — PROGRESS NOTES
Let her know echo with mild thickening of LV due to HTN as suspected  Overall pump fx ok, no valve problem  Keep fu tests and appt  Future Appointments  1/3/2019   9:00 AM    Thomas Chen MD         [facility]         KIAN SCHED  1/9/2019   11:00 AM   Sonia Tavares MD       [address]

## 2018-12-31 ENCOUNTER — TELEPHONE (OUTPATIENT)
Dept: CARDIOLOGY CLINIC | Age: 68
End: 2018-12-31

## 2019-01-02 ENCOUNTER — PATIENT OUTREACH (OUTPATIENT)
Dept: FAMILY MEDICINE CLINIC | Age: 69
End: 2019-01-02

## 2019-01-02 NOTE — TELEPHONE ENCOUNTER
Verified patient with two types of identifiers. Patient called and scheduled for nuclear stress test on 1-3-19. Patient given instructions and verbalized understanding.

## 2019-01-03 ENCOUNTER — CLINICAL SUPPORT (OUTPATIENT)
Dept: CARDIOLOGY CLINIC | Age: 69
End: 2019-01-03

## 2019-01-03 DIAGNOSIS — I49.9 IRREGULAR HEART RATE: ICD-10-CM

## 2019-01-03 DIAGNOSIS — R06.02 SOB (SHORTNESS OF BREATH): ICD-10-CM

## 2019-01-03 DIAGNOSIS — R07.89 CHEST TIGHTNESS: Primary | ICD-10-CM

## 2019-01-03 DIAGNOSIS — I10 HTN (HYPERTENSION), MALIGNANT: ICD-10-CM

## 2019-01-03 NOTE — PROGRESS NOTES
See scanned report. Dr. Ival Nissen ordered study and Dr. Ival Nissen read study. ID verified per protocol. Explained procedure and risks to patient. All concerns and questions ansewered prior to obtaining consent test. Patient developed dyspnea and leg discomfort during test. At 3 minutes in recovery, patient without symptoms or complaints voiced.

## 2019-01-09 ENCOUNTER — TELEPHONE (OUTPATIENT)
Dept: CARDIOLOGY CLINIC | Age: 69
End: 2019-01-09

## 2019-01-09 ENCOUNTER — OFFICE VISIT (OUTPATIENT)
Dept: CARDIOLOGY CLINIC | Age: 69
End: 2019-01-09

## 2019-01-09 VITALS
DIASTOLIC BLOOD PRESSURE: 69 MMHG | OXYGEN SATURATION: 96 % | HEIGHT: 63 IN | SYSTOLIC BLOOD PRESSURE: 145 MMHG | WEIGHT: 132 LBS | TEMPERATURE: 98.4 F | BODY MASS INDEX: 23.39 KG/M2 | HEART RATE: 70 BPM | RESPIRATION RATE: 16 BRPM

## 2019-01-09 DIAGNOSIS — I10 HTN (HYPERTENSION), MALIGNANT: Primary | ICD-10-CM

## 2019-01-09 DIAGNOSIS — R07.89 CHEST TIGHTNESS: ICD-10-CM

## 2019-01-09 DIAGNOSIS — I49.9 IRREGULAR HEART RATE: ICD-10-CM

## 2019-01-09 NOTE — PROGRESS NOTES
1. Have you been to the ER, urgent care clinic since your last visit? Hospitalized since your last visit? No    2. Have you seen or consulted any other health care providers outside of the [address] [place] [address] since your last visit? Include any pap smears or colon screening. No  Reviewed record in preparation for visit and have necessary documentation  Pt did not bring medication to office visit for review  Information was given to pt on Advanced Directives, Living Will  Information was given on Shingles Vaccine  opportunity was given for questions  Goals that were addressed and/or need to be completed during or after this appointment include   There are no preventive care reminders to display for this patient.

## 2019-01-09 NOTE — PATIENT INSTRUCTIONS
BRING ALL MEDICATION TO [facility] APPOINTMENT FOR DOCTOR TO REVIEW      January 9, 2019  Evette Boston   [address] [facility] [address]      Dear Javier Che:    Thank you for requesting access to QuicklyChat. Please follow the instructions below to view your test results, access and download parts of your medical record, view details of your past and upcoming appointments, and view your medications online. How Do I Sign Up? 1. In your internet browser, go to [email or web address]  2. Click on the First Time User? Click Here link in the Sign In box. You will see the New Member Sign Up page. 3. Enter your QuicklyChat Access Code exactly as it appears below. You will not need to use this code after youve completed the sign-up process. If you do not sign up before the expiration date, you must request a new code. · QuicklyChat Access Code: [email or web address]  · Expires: 2/22/2019 11:28 AM    4. Enter the last four digits of your Social Security Number (xxxx) and Date of Birth (mm/dd/yyyy) as indicated and click Submit. You will be taken to the next sign-up page. 5. Create a QuicklyChat ID. This will be your QuicklyChat login ID and cannot be changed, so think of one that is secure and easy to remember. 6. Create a QuicklyChat password. You can change your password at any time. 7. Enter your Password Reset Question and Answer. This can be used at a later time if you forget your password. 8. Enter your e-mail address. You will receive e-mail notification when new information is available in [address] 9. Click Sign Up. You can now view and download portions of your medical record. 10. Click the Download Summary menu link to download a portable copy of your medical information. Additional Information:              If you require any assistance or have any questions, please contact [employer] [address] at [phone], email at [email or web address] or check online in our Frequently Asked Questions. Remember, MyChart is NOT to be used for urgent needs. For medical emergencies, dial 911. Now available from your iPhone and Android! Sincerely,   [address] [facility]  Affiliated with [facility]  [address] [place] [address], [facility] Dayana Hutchinson [address] [facility]  [phone]    Monitor blood pressure outside the office several times weekly at different times during the day and evening. Bring the record to me in 3 weeks for review. Blood Pressure Record     Patient Name:  ______________________ :  ______________________    Date/Time BP Reading Pulse                                                                                                                                                                                                Home Blood Pressure Test: About This Test  What is it? A home blood pressure test allows you to keep track of your blood pressure at home. Blood pressure is a measure of the force of blood against the walls of your arteries. Blood pressure readings include two numbers, such as 130/80 (say \"130 over 80\"). The first number is the systolic pressure. The second number is the diastolic pressure. Why is this test done? You may do this test at home to:  · Find out if you have high blood pressure. · Track your blood pressure if you have high blood pressure. · Track how well medicine is working to reduce high blood pressure. · Check how lifestyle changes, such as weight loss and exercise, are affecting blood pressure. How can you prepare for the test?  · Do not use caffeine, tobacco, or medicines known to raise blood pressure (such as nasal decongestant sprays) for at least 30 minutes before taking your blood pressure. · Do not exercise for at least 30 minutes before taking your blood pressure. What happens before the test?  Take your blood pressure while you feel comfortable and relaxed.  Sit quietly with both feet on the floor for at least 5 minutes before the test.  What happens during the test?  · Sit with your arm slightly bent and resting on a table so that your upper arm is at the same level as your heart. · Roll up your sleeve or take off your shirt to expose your upper arm. · Wrap the blood pressure cuff around your upper arm so that the lower edge of the cuff is about 1 inch above the bend of your elbow. Proceed with the following steps depending on if you are using an automatic or manual pressure monitor. Automatic blood pressure monitors  · Press the on/off button on the automatic monitor and wait until the ready-to-measure \"heart\" symbol appears next to zero in the display window. · Press the start button. The cuff will inflate and deflate by itself. · Your blood pressure numbers will appear on the screen. · Write your numbers in your log book, along with the date and time. Manual blood pressure monitors  · Place the earpieces of a stethoscope in your ears, and place the bell of the stethoscope over the artery, just below the cuff. · Close the valve on the rubber inflating bulb. · Squeeze the bulb rapidly with your opposite hand to inflate the cuff until the dial or column of mercury reads about 30 mm Hg higher than your usual systolic pressure. If you do not know your usual pressure, inflate the cuff to 210 mm Hg or until the pulse at your wrist disappears. · Open the pressure valve just slightly by twisting or pressing the valve on the bulb. · As you watch the pressure slowly fall, note the level on the dial at which you first start to hear a pulsing or tapping sound through the stethoscope. This is your systolic blood pressure. · Continue letting the air out slowly. The sounds will become muffled and will finally disappear. Note the pressure when the sounds completely disappear. This is your diastolic blood pressure. Let out all the remaining air.   · Write your numbers in your log book, along with the date and time. What else should you know about the test?  Here are the categories of blood pressure for adults:  Ideal blood pressure. Systolic is less than 519, and diastolic is less than 80. Elevated blood pressure. Systolic is 686 to 235, and diastolic is less than 80. High blood pressure (hypertension). Systolic is 036 or above. Diastolic is 80 or above. One or both numbers may be high. It is more accurate to take the average of several readings made throughout the day than to rely on a single reading. Follow-up care is a key part of your treatment and safety. Be sure to make and go to all appointments, and call your doctor if you are having problems. It's also a good idea to keep a list of the medicines you take. Where can you learn more? Go to http://caprice-carlos.info/. Enter C427 in the search box to learn more about \"Home Blood Pressure Test: About This Test.\"  Current as of: December 6, 2017  Content Version: 11.8  © 1596-2834 Healthwise, Incorporated. Care instructions adapted under license by [employer] (which disclaims liability or warranty for this information). If you have questions about a medical condition or this instruction, always ask your healthcare professional. [place] [address] any warranty or liability for your use of this information.

## 2019-01-10 NOTE — PROGRESS NOTES
Papo Dario      1950   Clarke Lobo MD  Date of Visit-1/9/2019     [facility],  PCP=Nat West MD     [facility] [place]. HPI:   Mikayla Cancino is a 77 y.o. female   Palpitations and HTN. No known cardiac hx but there was an EKG in 2012 that showed LVH. She's had difficulty to control HTN and has been hesitant to change medications  Went to the ED 11/24/18 in with weakness. Her BP was elevated. Lab work was unrevealing. Notes no changes in symptoms  With mod ADORNO, pounding like sensation  Under stress and anxiety  HTN bp very variable  Assessment/Plans:  No diagnosis found. Malignant HTN-adjust meds goal <130 but avoid falls due to low bp   Chest tightness/palps/ADORNO-testing for echo, nuclear  Has PACs  Needs sleep study  her nuclear is normal for perfusion, less likely she has CAD and similar to echo her EF is low normal around 50   Her EKG shows changes of HTN and we want her BP at 120-130   Therefore she should increase the Toprol to 50 mg daily and let us know after 2 weeks how she is feeling  beta blocker dosing    Cardiac History:   Echo 12/2018 sclerotic AV, EF 52%, mild LVH     ROS:Cardiac complete  as above. Respiratory as above with no wheezing or hemoptysis. She denies  symptoms of unusual weight loss , fevers,  BRBPR, hematuria,  or recent stroke    Past Medical History:   Diagnosis Date    Essential hypertension 11/2/2016    Seizure ([address]     Seizures ([facility])       Exam and Labs:  Visit Vitals  /69 (BP 1 Location: Left arm, BP Patient Position: Sitting)   Pulse 70   Temp 98.4 °F (36.9 °C) (Oral)   Resp 16   Ht 5' 3\" (1.6 m)   Wt 132 lb (59.9 kg)   SpO2 96%   BMI 23.38 kg/m²     Constitutional:  NAD, comfortable , moist mucous membranesHENT: Head: NC,ATEyes: No scleral icterus. Neck:  Neck supple. No JVD present.  No tracheal deviation,mass  Chest: Effort normal & normal respiratory excursion Lungs:breath sounds normal. No stridor. distress, wheezes or  Rales. Heart:normal rate, regular rhythm, normal S1, S2, no murmurs, rubs, clicks or gallops , PMI non displaced. Edema: Edema is none. Extremities:  no clubbing or cyanosis. Abdominal:  no abnormal distension. Neurological: alert, conversant and oriented . Skin: Skin is not cold. No obvious systemic rash noted. Not diaphoretic. No erythema. Psychiatric:  Grossly normal mood and affect. Behavior appears normal.     Lab Results   Component Value Date/Time    Cholesterol, total 298 (H) 12/20/2018 03:39 PM    HDL Cholesterol 111 12/20/2018 03:39 PM    LDL, calculated 162 (H) 12/20/2018 03:39 PM    Triglyceride 124 12/20/2018 03:39 PM     Lab Results   Component Value Date/Time    Sodium 142 12/20/2018 03:39 PM    Potassium 3.8 12/20/2018 03:39 PM    Chloride 97 12/20/2018 03:39 PM    CO2 27 12/20/2018 03:39 PM    Anion gap 10 11/24/2018 10:17 AM    Glucose 85 12/20/2018 03:39 PM    BUN 15 12/20/2018 03:39 PM    Creatinine 0.90 12/20/2018 03:39 PM    BUN/Creatinine ratio 17 12/20/2018 03:39 PM    GFR est AA 75 12/20/2018 03:39 PM    GFR est non-AA 65 12/20/2018 03:39 PM    Calcium 10.0 12/20/2018 03:39 PM      Wt Readings from Last 3 Encounters:   01/09/19 132 lb (59.9 kg)   12/26/18 134 lb 9.6 oz (61.1 kg)   12/20/18 137 lb (62.1 kg)      BP Readings from Last 3 Encounters:   01/09/19 145/69   12/26/18 152/80   12/20/18 160/81        Current Outpatient Medications   Medication Sig    hydroCHLOROthiazide (HYDRODIURIL) 12.5 mg tablet Take 2 Tabs by mouth daily.  atorvastatin (LIPITOR) 20 mg tablet Take 1 Tab by mouth daily.  metoprolol succinate (TOPROL-XL) 25 mg XL tablet Take 1 Tab by mouth daily.  LORazepam (ATIVAN) 0.5 mg tablet Take 1 Tab by mouth every eight (8) hours as needed for Anxiety. Max Daily Amount: 1.5 mg.    timolol (TIMOPTIC) 0.5 % ophthalmic solution 1 Drop two (2) times a day.     phenytoin ER (DILANTIN ER) 100 mg ER capsule TAKE 3 CAPSULES BY MOUTH AT BEDTIME    aspirin delayed-release 81 mg tablet Take 1 Tab by mouth daily.  diclofenac EC (VOLTAREN) 25 mg EC tablet Take  by mouth two (2) times a day.  cyclobenzaprine (FLEXERIL) 10 mg tablet Take  by mouth three (3) times daily as needed for Muscle Spasm(s).  aspirin (ASPIRIN) 325 mg tablet Take 1 Tab by mouth daily. No current facility-administered medications for this visit. History reviewed. No pertinent surgical history. Social Hx=  reports that  has never smoked. she has never used smokeless tobacco. She reports that she drinks alcohol. She reports that she does not use drugs. Family Hx= family history includes Heart Disease in her sister; Stroke in her father. Impression see above.

## 2019-01-11 NOTE — TELEPHONE ENCOUNTER
Attempted to reach patient by telephone. A message was left for return call. Attempted to reach patient by telephone.  A message was left for return call.     ----- Message from Narcisa Latham MD sent at 1/9/2019  7:56 PM EST -----  Let her know her nuclear is normal for perfusion, less likely she has CAD and similar to echo her EF is low normal around 50   Her EKG shows changes of HTN and we want her BP at 120-130   Therefore she should increase the Toprol to 50 mg daily and let us know after 2 weeks how she is feeling  If we get tele health going she could be one of the patients on 1-23 or 1-30 in [facility] for fu

## 2019-01-14 ENCOUNTER — OFFICE VISIT (OUTPATIENT)
Dept: FAMILY MEDICINE CLINIC | Age: 69
End: 2019-01-14

## 2019-01-14 VITALS
BODY MASS INDEX: 23.39 KG/M2 | TEMPERATURE: 98.7 F | SYSTOLIC BLOOD PRESSURE: 183 MMHG | HEART RATE: 67 BPM | HEIGHT: 63 IN | WEIGHT: 132 LBS | DIASTOLIC BLOOD PRESSURE: 64 MMHG | RESPIRATION RATE: 16 BRPM | OXYGEN SATURATION: 98 %

## 2019-01-14 DIAGNOSIS — I10 ESSENTIAL HYPERTENSION: Primary | ICD-10-CM

## 2019-01-14 DIAGNOSIS — G40.909 SEIZURE DISORDER (HCC): ICD-10-CM

## 2019-01-14 NOTE — PROGRESS NOTES
1. Have you been to the ER, urgent care clinic since your last visit? Hospitalized since your last visit? no 
 
2. Have you seen or consulted any other health care providers outside of the [address] [place] [address] since your last visit? Include any pap smears or colon screening. no 
Reviewed record in preparation for visit and have obtained necessary documentation. Patient did not bring medications to visit for review. Information provided on Advanced Directive, Living Will. Body mass index is 23.38 kg/m². There are no preventive care reminders to display for this patient.

## 2019-01-14 NOTE — PATIENT INSTRUCTIONS
A Healthy Lifestyle: Care Instructions Your Care Instructions A healthy lifestyle can help you feel good, stay at a healthy weight, and have plenty of energy for both work and play. A healthy lifestyle is something you can share with your whole family. A healthy lifestyle also can lower your risk for serious health problems, such as high blood pressure, heart disease, and diabetes. You can follow a few steps listed below to improve your health and the health of your family. Follow-up care is a key part of your treatment and safety. Be sure to make and go to all appointments, and call your doctor if you are having problems. It's also a good idea to know your test results and keep a list of the medicines you take. How can you care for yourself at home? · Do not eat too much sugar, fat, or fast foods. You can still have dessert and treats now and then. The goal is moderation. · Start small to improve your eating habits. Pay attention to portion sizes, drink less juice and soda pop, and eat more fruits and vegetables. ? Eat a healthy amount of food. A 3-ounce serving of meat, for example, is about the size of a deck of cards. Fill the rest of your plate with vegetables and whole grains. ? Limit the amount of soda and sports drinks you have every day. Drink more water when you are thirsty. ? Eat at least 5 servings of fruits and vegetables every day. It may seem like a lot, but it is not hard to reach this goal. A serving or helping is 1 piece of fruit, 1 cup of vegetables, or 2 cups of leafy, raw vegetables. Have an apple or some carrot sticks as an afternoon snack instead of a candy bar. Try to have fruits and/or vegetables at every meal. 
· Make exercise part of your daily routine. You may want to start with simple activities, such as walking, bicycling, or slow swimming. Try to be active 30 to 60 minutes every day.  You do not need to do all 30 to 60 minutes all at once. For example, you can exercise 3 times a day for 10 or 20 minutes. Moderate exercise is safe for most people, but it is always a good idea to talk to your doctor before starting an exercise program. 
· Keep moving. Jessica Hunger the lawn, work in the garden, or [employer]. Take the stairs instead of the elevator at work. · If you smoke, quit. People who smoke have an increased risk for heart attack, stroke, cancer, and other lung illnesses. Quitting is hard, but there are ways to boost your chance of quitting tobacco for good. ? Use nicotine gum, patches, or lozenges. ? Ask your doctor about stop-smoking programs and medicines. ? Keep trying. In addition to reducing your risk of diseases in the future, you will notice some benefits soon after you stop using tobacco. If you have shortness of breath or asthma symptoms, they will likely get better within a few weeks after you quit. · Limit how much alcohol you drink. Moderate amounts of alcohol (up to 2 drinks a day for men, 1 drink a day for women) are okay. But drinking too much can lead to liver problems, high blood pressure, and other health problems. Family health If you have a family, there are many things you can do together to improve your health. · Eat meals together as a family as often as possible. · Eat healthy foods. This includes fruits, vegetables, lean meats and dairy, and whole grains. · Include your family in your fitness plan. Most people think of activities such as jogging or tennis as the way to fitness, but there are many ways you and your family can be more active. Anything that makes you breathe hard and gets your heart pumping is exercise. Here are some tips: 
? Walk to do errands or to take your child to school or the bus. 
? Go for a family bike ride after dinner instead of watching TV. Where can you learn more? Go to http://caprice-carlos.info/. Enter P863 in the search box to learn more about \"A Healthy Lifestyle: Care Instructions. \" Current as of: December 7, 2017 Content Version: 11.8 © 0602-4884 [employer], [employer]. Care instructions adapted under license by [employer] (which disclaims liability or warranty for this information). If you have questions about a medical condition or this instruction, always ask your healthcare professional. [place] [address] any warranty or liability for your use of this information.

## 2019-01-15 ENCOUNTER — TELEPHONE (OUTPATIENT)
Dept: FAMILY MEDICINE CLINIC | Age: 69
End: 2019-01-15

## 2019-01-15 NOTE — TELEPHONE ENCOUNTER
Returned pt's call. Left message to call back. I sent a message to her Cardiologist yesterday and have not heard back quite yet. I will let her know as soon as I do. I just need to know if he definitely wants her on a beta blocker or if it would be ok to switch to a different class.

## 2019-01-15 NOTE — TELEPHONE ENCOUNTER
Spoke with patient and she states she heard from her neurologist today and they stated her MRI was ok and they want her to stay on her regular dose of dilantin. She does feel that the metoprolol is not agreeing with her and would like to try another medication as discussed. Please advise.

## 2019-01-15 NOTE — TELEPHONE ENCOUNTER
She has a question about some medicine that was discussed yesterday. Please call back to discuss at [phone].

## 2019-01-24 ENCOUNTER — TELEPHONE (OUTPATIENT)
Dept: CARDIOLOGY CLINIC | Age: 69
End: 2019-01-24

## 2019-01-25 NOTE — TELEPHONE ENCOUNTER
Two patient identifiers verified. Per MD patient called and given result of nuclear stress test. Patient verbalized understanding. Patient reports she is not taking metoprolol, that she only took it for 30 days and did not like the side effects. She reports she has a new PCP, Dr. Elizabeth Moses, and that she is going to trial her new a new HTN medication after speaking with Dr. Frannie Alex. Let her know I will call her back on Monday after speaking with Dr. Frannie Alex. She told me to leave a detailed VM on her home number if she does not answer.

## 2019-01-28 ENCOUNTER — OFFICE VISIT (OUTPATIENT)
Dept: FAMILY MEDICINE CLINIC | Age: 69
End: 2019-01-28

## 2019-01-28 VITALS
TEMPERATURE: 98.2 F | WEIGHT: 131 LBS | BODY MASS INDEX: 23.21 KG/M2 | SYSTOLIC BLOOD PRESSURE: 177 MMHG | HEIGHT: 63 IN | HEART RATE: 67 BPM | RESPIRATION RATE: 16 BRPM | DIASTOLIC BLOOD PRESSURE: 73 MMHG | OXYGEN SATURATION: 98 %

## 2019-01-28 DIAGNOSIS — I10 ESSENTIAL HYPERTENSION: Primary | ICD-10-CM

## 2019-01-28 DIAGNOSIS — R53.83 FATIGUE, UNSPECIFIED TYPE: ICD-10-CM

## 2019-01-28 RX ORDER — LISINOPRIL 10 MG/1
10 TABLET ORAL DAILY
Qty: 30 TAB | Refills: 1 | Status: SHIPPED | OUTPATIENT
Start: 2019-01-28 | End: 2019-03-30 | Stop reason: SDUPTHER

## 2019-01-28 NOTE — PROGRESS NOTES
1. Have you been to the ER, urgent care clinic since your last visit? Hospitalized since your last visit? no    2. Have you seen or consulted any other health care providers outside of the [address] [place] [address] since your last visit? Include any pap smears or colon screening. No  Reviewed record in preparation for visit and have obtained necessary documentation. Patient did not bring medications to visit for review. Information provided on Advanced Directive, Living Will. Body mass index is 23.21 kg/m². There are no preventive care reminders to display for this patient.

## 2019-01-28 NOTE — PATIENT INSTRUCTIONS
A Healthy Lifestyle: Care Instructions  Your Care Instructions    A healthy lifestyle can help you feel good, stay at a healthy weight, and have plenty of energy for both work and play. A healthy lifestyle is something you can share with your whole family. A healthy lifestyle also can lower your risk for serious health problems, such as high blood pressure, heart disease, and diabetes. You can follow a few steps listed below to improve your health and the health of your family. Follow-up care is a key part of your treatment and safety. Be sure to make and go to all appointments, and call your doctor if you are having problems. It's also a good idea to know your test results and keep a list of the medicines you take. How can you care for yourself at home? · Do not eat too much sugar, fat, or fast foods. You can still have dessert and treats now and then. The goal is moderation. · Start small to improve your eating habits. Pay attention to portion sizes, drink less juice and soda pop, and eat more fruits and vegetables. ? Eat a healthy amount of food. A 3-ounce serving of meat, for example, is about the size of a deck of cards. Fill the rest of your plate with vegetables and whole grains. ? Limit the amount of soda and sports drinks you have every day. Drink more water when you are thirsty. ? Eat at least 5 servings of fruits and vegetables every day. It may seem like a lot, but it is not hard to reach this goal. A serving or helping is 1 piece of fruit, 1 cup of vegetables, or 2 cups of leafy, raw vegetables. Have an apple or some carrot sticks as an afternoon snack instead of a candy bar. Try to have fruits and/or vegetables at every meal.  · Make exercise part of your daily routine. You may want to start with simple activities, such as walking, bicycling, or slow swimming. Try to be active 30 to 60 minutes every day. You do not need to do all 30 to 60 minutes all at once.  For example, you can exercise 3 times a day for 10 or 20 minutes. Moderate exercise is safe for most people, but it is always a good idea to talk to your doctor before starting an exercise program.  · Keep moving. [place] the lawn, work in the garden, or [place]. Take the stairs instead of the elevator at work. · If you smoke, quit. People who smoke have an increased risk for heart attack, stroke, cancer, and other lung illnesses. Quitting is hard, but there are ways to boost your chance of quitting tobacco for good. ? Use nicotine gum, patches, or lozenges. ? Ask your doctor about stop-smoking programs and medicines. ? Keep trying. In addition to reducing your risk of diseases in the future, you will notice some benefits soon after you stop using tobacco. If you have shortness of breath or asthma symptoms, they will likely get better within a few weeks after you quit. · Limit how much alcohol you drink. Moderate amounts of alcohol (up to 2 drinks a day for men, 1 drink a day for women) are okay. But drinking too much can lead to liver problems, high blood pressure, and other health problems. Family health  If you have a family, there are many things you can do together to improve your health. · Eat meals together as a family as often as possible. · Eat healthy foods. This includes fruits, vegetables, lean meats and dairy, and whole grains. · Include your family in your fitness plan. Most people think of activities such as jogging or tennis as the way to fitness, but there are many ways you and your family can be more active. Anything that makes you breathe hard and gets your heart pumping is exercise. Here are some tips:  ? Walk to do errands or to take your child to school or the bus.  ? Go for a family bike ride after dinner instead of watching TV. Where can you learn more? Go to http://caprice-carlos.info/. Enter H563 in the search box to learn more about \"A Healthy Lifestyle: Care Instructions. \"  Current as of: September 11, 2018  Content Version: 11.9  © 7244-9920 [employer]. Care instructions adapted under license by [employer] (which disclaims liability or warranty for this information). If you have questions about a medical condition or this instruction, always ask your healthcare professional. [address] any warranty or liability for your use of this information.

## 2019-01-28 NOTE — PROGRESS NOTES
CC: Fatigue    HPI: Pt is a 77 y.o. female who presents for fatigue. States that she has been feeling more tired for quite a while and has a hard time describing time course and exact symptoms. She does not feel limited in her activities due to her breathing or any cardiac symptoms. She just seems to have less energy during the day than she used to. 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 She denies symptoms of depression and still enjoys her normal activities. Concentration is normal. She reports good sleep on 6/7 nights of the week. She had a normal stress test earlier this month. She has also had a normal CMP, CBC and TSH within the last 3 months. Since her last visit she has not been taking her metoprolol. She has been taking her HCTZ 12.5mg daily and sometimes she takes two but it is unclear how she decides when to take one versus two and she does not like the idea of taking two every day. She brought her home machine here today with BP range of 125-155/60-80's today (no previous values stored for comparison). Her machine when checked against ours produces a systolic reading about 20 points higher than our machine. She is back on her normal dose of phenytoin and had a recent visit with her Neurologist where she was told everything was fine. She had a brain MRI as part of that which showed no acute changes. I personally reviewed the results of this as well as her stress test.       Past Medical History:   Diagnosis Date    Essential hypertension 11/2/2016    [facility] [address]     Seizures ([facility] [address]        Family History   Problem Relation Age of Onset    Stroke Father     Heart Disease Sister        Social History     Tobacco Use    Smoking status: Never Smoker    Smokeless tobacco: Never Used   Substance Use Topics    Alcohol use:  Yes    Drug use: No       ROS:  Positive only when bolded  Constitutional: HA, F/C, changes in weight, fatigue  Respiratory: SOB, wheezing, cough  Cardiovascular: CP, palpitations  Musculoskeletal: Myalgias, arthralgias  Neurological: Changes in gait    PE:  Visit Vitals  /73 (BP 1 Location: Left arm, BP Patient Position: Sitting)   Pulse 67   Temp 98.2 °F (36.8 °C) (Oral)   Resp 16   Ht 5' 3\" (1.6 m)   Wt 131 lb (59.4 kg)   SpO2 98%   BMI 23.21 kg/m²     Gen: Pt sitting in chair, in NAD  Head: Normocephalic, atraumatic  Eyes: Sclera anicteric, EOM grossly intact, PERRL  Throat: MMM, normal lips, tongue, teeth and gums  Neck: Supple  CVS: Normal S1, S2, no m/r/g  Resp: CTAB, no wheezes or rales  Extrem: Atraumatic, no cyanosis  Pulses: 2+   Skin: Warm, dry  Neuro: Alert, appropriate      A/P: Pt is a 77 y.o. female who presents for fatigue. Symptoms vague and of unclear etiology. Discussed with pt that the most worrisome causes of fatigue have been ruled out, which is reassuring but frustrating that she has no answer. It is possible this is to be expected with her aging. Unfortunately this is only the second time I am meeting her so it is hard to gauge a baseline from prior. It is possible that her uncontrolled HTN is contributing to her symptoms, although her values at home appear to be better. Advised that primarily we should control her BP to decrease her risk of stroke, and we can also discuss options for further work-up of her fatigue but at this time I am at a loss for a test that would be high yield. Will discuss options for nonspecific inflammatory marker testing and tick studies at next visit if she is not feeling better once her BP is under better control  - Add lisinopril 10mg daily  - Pt advised that usual dose of HCTZ is 25mg and it is perfectly safe to take it at this dose and I would encourage that. She seems unsure still.   - RTC in 2-4 weeks for f/u BP. Pt given log to record values and bring to the visit. Discussed diagnoses in detail with patient.    Medication risks/benefits/side effects discussed with patient. All of the patient's questions were addressed. The patient understands and agrees with our plan of care. The patient knows to call back if they are unsure of or forget any changes we discussed today or if the symptoms change. The patient received an After-Visit Summary which contains VS, orders, medication list and allergy list. This can be used as a \"mini-medical record\" should they have to seek medical care while out of town. Current Outpatient Medications on File Prior to Visit   Medication Sig Dispense Refill    hydroCHLOROthiazide (HYDRODIURIL) 12.5 mg tablet Take 2 Tabs by mouth daily. 30 Tab 2    atorvastatin (LIPITOR) 20 mg tablet Take 1 Tab by mouth daily. 30 Tab 2    timolol (TIMOPTIC) 0.5 % ophthalmic solution 1 Drop two (2) times a day.  phenytoin ER (DILANTIN ER) 100 mg ER capsule TAKE 3 CAPSULES BY MOUTH AT BEDTIME 90 Cap 3    aspirin delayed-release 81 mg tablet Take 1 Tab by mouth daily. 100 Tab 3    metoprolol succinate (TOPROL-XL) 25 mg XL tablet Take 1 Tab by mouth daily. 30 Tab 0    LORazepam (ATIVAN) 0.5 mg tablet Take 1 Tab by mouth every eight (8) hours as needed for Anxiety. Max Daily Amount: 1.5 mg. 5 Tab 0     No current facility-administered medications on file prior to visit.

## 2019-01-30 NOTE — TELEPHONE ENCOUNTER
I have communciated with Doris Cisse MD and agree with her plans for BP meds  Let pt know I had already discussed   BP Readings from Last 3 Encounters:   01/28/19 177/73   01/14/19 183/64   01/09/19 145/69    I would like to see her next time I am at [facility] in Feb, keep bp diary

## 2019-02-14 ENCOUNTER — OFFICE VISIT (OUTPATIENT)
Dept: FAMILY MEDICINE CLINIC | Age: 69
End: 2019-02-14

## 2019-02-14 VITALS
OXYGEN SATURATION: 98 % | DIASTOLIC BLOOD PRESSURE: 67 MMHG | BODY MASS INDEX: 23.57 KG/M2 | HEART RATE: 64 BPM | RESPIRATION RATE: 16 BRPM | WEIGHT: 133 LBS | SYSTOLIC BLOOD PRESSURE: 175 MMHG | TEMPERATURE: 98.6 F | HEIGHT: 63 IN

## 2019-02-14 DIAGNOSIS — I10 ESSENTIAL HYPERTENSION: Primary | ICD-10-CM

## 2019-02-14 RX ORDER — HYDROCHLOROTHIAZIDE 25 MG/1
25 TABLET ORAL DAILY
Qty: 30 TAB | Refills: 3
Start: 2019-02-14 | End: 2019-06-14 | Stop reason: SDUPTHER

## 2019-02-14 NOTE — PROGRESS NOTES
1. Have you been to the ER, urgent care clinic since your last visit? Hospitalized since your last visit? no 
 
2. Have you seen or consulted any other health care providers outside of the [address] [place] [address] since your last visit? Include any pap smears or colon screening. no 
Reviewed record in preparation for visit and have obtained necessary documentation. Patient did not bring medications to visit for review. Information provided on Advanced Directive, Living Will. Body mass index is 23.56 kg/m². There are no preventive care reminders to display for this patient.

## 2019-02-14 NOTE — PROGRESS NOTES
CC: BP follow-up HPI: Pt is a 77 y.o. female who presents for BP follow-up. She has been taking her BP at home with values in the 130-140's/60-70's. She has her logs today and brought her machine to the visit last time and it was reading higher than ours. She does report that she had been taking 25mg HCTZ (two of the 12.5mg tabs) daily until running out a few days ago. Past Medical History:  
Diagnosis Date  Essential hypertension 11/2/2016  [facility] [address]  Seizures ([facility] [address] Family History Problem Relation Age of Onset  Stroke Father  Heart Disease Sister Social History Tobacco Use  Smoking status: Never Smoker  Smokeless tobacco: Never Used Substance Use Topics  Alcohol use: Yes  Drug use: No  
 
 
ROS: 
Positive only when bolded Constitutional: HA, F/C, changes in weight Eyes: Changes in vision Cardiovascular: CP, palpitations Hematologic/lymphatic: XENIA 
 
PE: 
Visit Vitals /67 (BP 1 Location: Left arm, BP Patient Position: Sitting) Pulse 64 Temp 98.6 °F (37 °C) (Oral) Resp 16 Ht 5' 3\" (1.6 m) Wt 133 lb (60.3 kg) SpO2 98% BMI 23.56 kg/m² Gen: Pt sitting in chair, in NAD Head: Normocephalic, atraumatic Eyes: Sclera anicteric, EOM grossly intact, PERRL Throat: MMM Neck: Supple CVS: Normal S1, S2, no m/r/g Resp: CTAB, no wheezes or rales Extrem: Atraumatic, no cyanosis or edema Pulses: 2+ Skin: Warm, dry Neuro: Alert, appropriate A/P: Pt is a 77 y.o. female who presents for f/u BP. Still reading high here but pt with logs today showing normal readings at home and her machine was reading higher than ours at her last visit. - Continue 25mg HCTZ and 10mg lisinopril.  
- RTC in 3 months for f/u chronic conditions, or sooner prn if BP >150/90 at home Discussed diagnoses in detail with patient. Medication risks/benefits/side effects discussed with patient. All of the patient's questions were addressed. The patient understands and agrees with our plan of care. The patient knows to call back if they are unsure of or forget any changes we discussed today or if the symptoms change. The patient received an After-Visit Summary which contains VS, orders, medication list and allergy list. This can be used as a \"mini-medical record\" should they have to seek medical care while out of town. Current Outpatient Medications on File Prior to Visit Medication Sig Dispense Refill  hydroCHLOROthiazide (HYDRODIURIL) 12.5 mg tablet Take 1 Tab by mouth daily. 30 Tab 3  
 lisinopril (PRINIVIL, ZESTRIL) 10 mg tablet Take 1 Tab by mouth daily. 30 Tab 1  
 atorvastatin (LIPITOR) 20 mg tablet Take 1 Tab by mouth daily. 30 Tab 2  
 timolol (TIMOPTIC) 0.5 % ophthalmic solution 1 Drop two (2) times a day.  phenytoin ER (DILANTIN ER) 100 mg ER capsule TAKE 3 CAPSULES BY MOUTH AT BEDTIME 90 Cap 3  
 aspirin delayed-release 81 mg tablet Take 1 Tab by mouth daily. 100 Tab 3 No current facility-administered medications on file prior to visit.

## 2019-02-14 NOTE — PATIENT INSTRUCTIONS
Please bring your blood pressure cuff to your next visit! DASH Diet: Care Instructions Your Care Instructions The DASH diet is an eating plan that can help lower your blood pressure. DASH stands for Dietary Approaches to Stop Hypertension. Hypertension is high blood pressure. The DASH diet focuses on eating foods that are high in calcium, potassium, and magnesium. These nutrients can lower blood pressure. The foods that are highest in these nutrients are fruits, vegetables, low-fat dairy products, nuts, seeds, and legumes. But taking calcium, potassium, and magnesium supplements instead of eating foods that are high in those nutrients does not have the same effect. The DASH diet also includes whole grains, fish, and poultry. The DASH diet is one of several lifestyle changes your doctor may recommend to lower your high blood pressure. Your doctor may also want you to decrease the amount of sodium in your diet. Lowering sodium while following the DASH diet can lower blood pressure even further than just the DASH diet alone. Follow-up care is a key part of your treatment and safety. Be sure to make and go to all appointments, and call your doctor if you are having problems. It's also a good idea to know your test results and keep a list of the medicines you take. How can you care for yourself at home? Following the DASH diet · Eat 4 to 5 servings of fruit each day. A serving is 1 medium-sized piece of fruit, ½ cup chopped or canned fruit, 1/4 cup dried fruit, or 4 ounces (½ cup) of fruit juice. Choose fruit more often than fruit juice. · Eat 4 to 5 servings of vegetables each day. A serving is 1 cup of lettuce or raw leafy vegetables, ½ cup of chopped or cooked vegetables, or 4 ounces (½ cup) of vegetable juice. Choose vegetables more often than vegetable juice. · Get 2 to 3 servings of low-fat and fat-free dairy each day. A serving is 8 ounces of milk, 1 cup of yogurt, or 1 ½ ounces of cheese. · Eat 6 to 8 servings of grains each day. A serving is 1 slice of bread, 1 ounce of dry cereal, or ½ cup of cooked rice, pasta, or cooked cereal. Try to choose whole-grain products as much as possible. · Limit lean meat, poultry, and fish to 2 servings each day. A serving is 3 ounces, about the size of a deck of cards. · Eat 4 to 5 servings of nuts, seeds, and legumes (cooked dried beans, lentils, and split peas) each week. A serving is 1/3 cup of nuts, 2 tablespoons of seeds, or ½ cup of cooked beans or peas. · Limit fats and oils to 2 to 3 servings each day. A serving is 1 teaspoon of vegetable oil or 2 tablespoons of salad dressing. · Limit sweets and added sugars to 5 servings or less a week. A serving is 1 tablespoon jelly or jam, ½ cup sorbet, or 1 cup of lemonade. · Eat less than 2,300 milligrams (mg) of sodium a day. If you limit your sodium to 1,500 mg a day, you can lower your blood pressure even more. Tips for success · Start small. Do not try to make dramatic changes to your diet all at once. You might feel that you are missing out on your favorite foods and then be more likely to not follow the plan. Make small changes, and stick with them. Once those changes become habit, add a few more changes. · Try some of the following: ? Make it a goal to eat a fruit or vegetable at every meal and at snacks. This will make it easy to get the recommended amount of fruits and vegetables each day. ? Try yogurt topped with fruit and nuts for a snack or healthy dessert. ? Add lettuce, tomato, cucumber, and onion to sandwiches. ? Combine a ready-made pizza crust with low-fat mozzarella cheese and lots of vegetable toppings. Try using tomatoes, squash, spinach, broccoli, carrots, cauliflower, and onions. ? Have a variety of cut-up vegetables with a low-fat dip as an appetizer instead of chips and dip. ? Sprinkle sunflower seeds or chopped almonds over salads.  Or try adding chopped walnuts or almonds to cooked vegetables. ? Try some vegetarian meals using beans and peas. Add garbanzo or kidney beans to salads. Make burritos and tacos with mashed hatfield beans or black beans. Where can you learn more? Go to http://caprice-carlos.info/. Enter K158 in the search box to learn more about \"DASH Diet: Care Instructions. \" Current as of: July 22, 2018 Content Version: 11.9 © 3604-6613 [employer]. Care instructions adapted under license by [employer] (which disclaims liability or warranty for this information). If you have questions about a medical condition or this instruction, always ask your healthcare professional. [address] any warranty or liability for your use of this information.

## 2019-03-14 DIAGNOSIS — G40.909 SEIZURE DISORDER (HCC): ICD-10-CM

## 2019-03-15 RX ORDER — PHENYTOIN SODIUM 100 MG/1
CAPSULE, EXTENDED RELEASE ORAL
Qty: 90 CAP | Refills: 0 | Status: SHIPPED | OUTPATIENT
Start: 2019-03-15

## 2019-03-15 NOTE — TELEPHONE ENCOUNTER
Rx request for dilantin. Pt is followed by Neurology and I believe had told me the Neurologist changed something with the dose of this medication recently, that is why I requested that Neurology fill it. We do not monitor her dilantin here and I don't have all the Neurology records. Will fill for 30 day supply so she doesn't run out, but this needs to come from Neurology in the future. Discussed with pt, she is in agreement with this and did not realize the medication was being sent here for refill. She reports a normal dilantin level at her last check with Neurology. Correct dose confirmed. All questions answered and pt feels comfortable with the plan of care.

## 2019-03-30 DIAGNOSIS — I10 ESSENTIAL HYPERTENSION: ICD-10-CM

## 2019-04-01 RX ORDER — LISINOPRIL 10 MG/1
TABLET ORAL
Qty: 30 TAB | Refills: 1 | Status: SHIPPED | OUTPATIENT
Start: 2019-04-01 | End: 2019-06-06 | Stop reason: SDUPTHER

## 2019-07-11 DIAGNOSIS — I10 ESSENTIAL HYPERTENSION: ICD-10-CM

## 2019-07-12 RX ORDER — HYDROCHLOROTHIAZIDE 25 MG/1
TABLET ORAL
Qty: 30 TAB | Refills: 0 | Status: SHIPPED | OUTPATIENT
Start: 2019-07-12 | End: 2019-08-19 | Stop reason: SDUPTHER

## 2019-07-12 RX ORDER — LISINOPRIL 10 MG/1
TABLET ORAL
Qty: 30 TAB | Refills: 0 | Status: SHIPPED | OUTPATIENT
Start: 2019-07-12 | End: 2019-08-19 | Stop reason: SDUPTHER

## 2019-08-19 ENCOUNTER — OFFICE VISIT (OUTPATIENT)
Dept: FAMILY MEDICINE CLINIC | Age: 69
End: 2019-08-19

## 2019-08-19 VITALS
TEMPERATURE: 99.3 F | BODY MASS INDEX: 22.86 KG/M2 | OXYGEN SATURATION: 98 % | HEART RATE: 68 BPM | HEIGHT: 63 IN | WEIGHT: 129 LBS | SYSTOLIC BLOOD PRESSURE: 155 MMHG | RESPIRATION RATE: 16 BRPM | DIASTOLIC BLOOD PRESSURE: 72 MMHG

## 2019-08-19 DIAGNOSIS — I10 ESSENTIAL HYPERTENSION: ICD-10-CM

## 2019-08-19 DIAGNOSIS — E78.2 MIXED HYPERLIPIDEMIA: Primary | ICD-10-CM

## 2019-08-19 RX ORDER — HYDROCHLOROTHIAZIDE 25 MG/1
TABLET ORAL
Qty: 30 TAB | Refills: 5 | Status: SHIPPED | OUTPATIENT
Start: 2019-08-19 | End: 2020-02-14

## 2019-08-19 RX ORDER — LISINOPRIL 10 MG/1
TABLET ORAL
Qty: 30 TAB | Refills: 5 | Status: SHIPPED | OUTPATIENT
Start: 2019-08-19 | End: 2020-11-23

## 2019-08-19 RX ORDER — LATANOPROST 50 UG/ML
SOLUTION/ DROPS OPHTHALMIC
Refills: 3 | COMMUNITY
Start: 2019-07-03

## 2019-08-19 NOTE — PROGRESS NOTES
1. Have you been to the ER, urgent care clinic since your last visit? Hospitalized since your last visit? No    2. Have you seen or consulted any other health care providers outside of the [address] [place] [address] since your last visit? Include any pap smears or colon screening.  No  Reviewed record in preparation for visit and have necessary documentation  Pt did not bring medication to office visit for review    Goals that were addressed and/or need to be completed during or after this appointment include   Health Maintenance Due   Topic Date Due    Pneumococcal 65+ years (1 of 2 - PCV13) 05/27/2013    BREAST CANCER SCRN MAMMOGRAM  07/25/2019    Influenza Age 5 to Adult  08/01/2019    MEDICARE YEARLY EXAM  08/24/2019

## 2019-08-19 NOTE — PATIENT INSTRUCTIONS
Digna Issa with [facility]  [address] [place] [address], [facility] [address] [facility], [facility]  [phone]    Monitor blood pressure outside the office several times weekly at different times during the day and evening. Bring the record to me in 3 weeks for review. Blood Pressure Record     Patient Name:  ______________________ :  ______________________    Date/Time BP Reading Pulse                                                                                                                                                                                                Home Blood Pressure Test: About This Test  What is it? A home blood pressure test allows you to keep track of your blood pressure at home. Blood pressure is a measure of the force of blood against the walls of your arteries. Blood pressure readings include two numbers, such as 130/80 (say \"130 over 80\"). The first number is the systolic pressure. The second number is the diastolic pressure. Why is this test done? You may do this test at home to:  · Find out if you have high blood pressure. · Track your blood pressure if you have high blood pressure. · Track how well medicine is working to reduce high blood pressure. · Check how lifestyle changes, such as weight loss and exercise, are affecting blood pressure. How can you prepare for the test?  · Do not use caffeine, tobacco, or medicines known to raise blood pressure (such as nasal decongestant sprays) for at least 30 minutes before taking your blood pressure. · Do not exercise for at least 30 minutes before taking your blood pressure. What happens before the test?  Take your blood pressure while you feel comfortable and relaxed.  Sit quietly with both feet on the floor for at least 5 minutes before the test.  What happens during the test?  · Sit with your arm slightly bent and resting on a table so that your upper arm is at the same level as your heart.  · Roll up your sleeve or take off your shirt to expose your upper arm. · Wrap the blood pressure cuff around your upper arm so that the lower edge of the cuff is about 1 inch above the bend of your elbow. Proceed with the following steps depending on if you are using an automatic or manual pressure monitor. Automatic blood pressure monitors  · Press the on/off button on the automatic monitor and wait until the ready-to-measure \"heart\" symbol appears next to zero in the display window. · Press the start button. The cuff will inflate and deflate by itself. · Your blood pressure numbers will appear on the screen. · Write your numbers in your log book, along with the date and time. Manual blood pressure monitors  · Place the earpieces of a stethoscope in your ears, and place the bell of the stethoscope over the artery, just below the cuff. · Close the valve on the rubber inflating bulb. · Squeeze the bulb rapidly with your opposite hand to inflate the cuff until the dial or column of mercury reads about 30 mm Hg higher than your usual systolic pressure. If you do not know your usual pressure, inflate the cuff to 210 mm Hg or until the pulse at your wrist disappears. · Open the pressure valve just slightly by twisting or pressing the valve on the bulb. · As you watch the pressure slowly fall, note the level on the dial at which you first start to hear a pulsing or tapping sound through the stethoscope. This is your systolic blood pressure. · Continue letting the air out slowly. The sounds will become muffled and will finally disappear. Note the pressure when the sounds completely disappear. This is your diastolic blood pressure. Let out all the remaining air. · Write your numbers in your log book, along with the date and time. What else should you know about the test?  It is more accurate to take the average of several readings made throughout the day than to rely on a single reading.  It's normal for blood pressure to go up and down throughout the day. Follow-up care is a key part of your treatment and safety. Be sure to make and go to all appointments, and call your doctor if you are having problems. It's also a good idea to keep a list of the medicines you take. Where can you learn more? Go to http://caprice-carlos.info/. Enter C427 in the search box to learn more about \"Home Blood Pressure Test: About This Test.\"  Current as of: July 22, 2018  Content Version: 12.1  © 1959-9710 [employer]. Care instructions adapted under license by [employer] (which disclaims liability or warranty for this information). If you have questions about a medical condition or this instruction, always ask your healthcare professional. [address] any warranty or liability for your use of this information.

## 2019-08-20 ENCOUNTER — TELEPHONE (OUTPATIENT)
Dept: FAMILY MEDICINE CLINIC | Age: 69
End: 2019-08-20

## 2019-08-20 LAB
BUN SERPL-MCNC: 16 MG/DL (ref 8–27)
BUN/CREAT SERPL: 16 (ref 12–28)
CALCIUM SERPL-MCNC: 9.5 MG/DL (ref 8.7–10.3)
CHLORIDE SERPL-SCNC: 100 MMOL/L (ref 96–106)
CHOLEST SERPL-MCNC: 292 MG/DL (ref 100–199)
CO2 SERPL-SCNC: 28 MMOL/L (ref 20–29)
CREAT SERPL-MCNC: 1.02 MG/DL (ref 0.57–1)
GLUCOSE SERPL-MCNC: 87 MG/DL (ref 65–99)
HDLC SERPL-MCNC: 107 MG/DL
LDLC SERPL CALC-MCNC: 164 MG/DL (ref 0–99)
POTASSIUM SERPL-SCNC: 3.6 MMOL/L (ref 3.5–5.2)
SODIUM SERPL-SCNC: 142 MMOL/L (ref 134–144)
TRIGL SERPL-MCNC: 105 MG/DL (ref 0–149)
VLDLC SERPL CALC-MCNC: 21 MG/DL (ref 5–40)

## 2019-08-20 NOTE — TELEPHONE ENCOUNTER
Telephone Encounter        Caller's first/last name:  Self      Relationship to patient and are they on HIPAA:  Self      Did you check for a duplicate Encounter? Yes      Reason for call: Lab Results      Further clarification of call:  Pt requesting a copy of recent lab results mailed to her.       Does caller want a return call?  no      Best contact number:  [phone]

## 2019-08-20 NOTE — TELEPHONE ENCOUNTER
Pt returned call with daughter on the line and discussed results and my recommendations. Pt will continue to think about this but thinks she is going to start taking the atorvastatin again. She will call back if she needs any refills.

## 2019-08-20 NOTE — TELEPHONE ENCOUNTER
Called to advise pt of recent results. Cholesterol remains elevated, unfortunately, and her 10 year risk of heart attacks or strokes is 22%. Would strongly recommend she re-start the statin medication. Pt would like to discuss this with her daughter and is going to try to get us on a three-way call. She will call back.   [phone] Galdino Romero

## 2020-02-14 DIAGNOSIS — I10 ESSENTIAL HYPERTENSION: ICD-10-CM

## 2020-02-14 RX ORDER — HYDROCHLOROTHIAZIDE 25 MG/1
TABLET ORAL
Qty: 30 TAB | Refills: 0 | Status: SHIPPED | OUTPATIENT
Start: 2020-02-14 | End: 2020-03-17

## 2020-02-14 NOTE — TELEPHONE ENCOUNTER
Left message for patient to call. Dr. Latasha Toledo refilled her hydrochlorothiazide for 30 days however, she needs an appointment to be seen.

## 2020-03-17 DIAGNOSIS — I10 ESSENTIAL HYPERTENSION: ICD-10-CM

## 2020-03-17 RX ORDER — HYDROCHLOROTHIAZIDE 25 MG/1
TABLET ORAL
Qty: 30 TAB | Refills: 0 | Status: SHIPPED | OUTPATIENT
Start: 2020-03-17 | End: 2020-03-19

## 2020-04-15 DIAGNOSIS — I10 ESSENTIAL HYPERTENSION: ICD-10-CM

## 2020-04-16 RX ORDER — HYDROCHLOROTHIAZIDE 25 MG/1
TABLET ORAL
Qty: 30 TAB | Refills: 0 | Status: SHIPPED | OUTPATIENT
Start: 2020-04-16 | End: 2020-05-14

## 2020-05-14 DIAGNOSIS — I10 ESSENTIAL HYPERTENSION: ICD-10-CM

## 2020-05-14 RX ORDER — HYDROCHLOROTHIAZIDE 25 MG/1
TABLET ORAL
Qty: 30 TAB | Refills: 0 | Status: SHIPPED | OUTPATIENT
Start: 2020-05-14 | End: 2020-06-12

## 2020-06-12 DIAGNOSIS — I10 ESSENTIAL HYPERTENSION: ICD-10-CM

## 2020-06-12 RX ORDER — HYDROCHLOROTHIAZIDE 25 MG/1
TABLET ORAL
Qty: 30 TAB | Refills: 0 | Status: SHIPPED | OUTPATIENT
Start: 2020-06-12 | End: 2020-07-15

## 2020-06-12 NOTE — TELEPHONE ENCOUNTER
Can we please get her an appt in office to follow-up? I have been trying to get her in for the past 4 months but she hasn't scheduled an appt yet. It isn't safe to keep filling this without checking her BP and getting labs. She has not been well-controlled. Thanks!

## 2020-06-29 ENCOUNTER — HOSPITAL ENCOUNTER (OUTPATIENT)
Dept: LAB | Age: 70
Discharge: HOME | End: 2020-06-29

## 2020-06-29 ENCOUNTER — OFFICE VISIT (OUTPATIENT)
Dept: FAMILY MEDICINE CLINIC | Age: 70
End: 2020-06-29

## 2020-06-29 VITALS
WEIGHT: 131 LBS | DIASTOLIC BLOOD PRESSURE: 75 MMHG | HEART RATE: 76 BPM | HEIGHT: 63 IN | SYSTOLIC BLOOD PRESSURE: 171 MMHG | BODY MASS INDEX: 23.21 KG/M2 | TEMPERATURE: 98.8 F | OXYGEN SATURATION: 98 % | RESPIRATION RATE: 16 BRPM

## 2020-06-29 DIAGNOSIS — I10 ESSENTIAL HYPERTENSION: ICD-10-CM

## 2020-06-29 DIAGNOSIS — E78.2 MIXED HYPERLIPIDEMIA: ICD-10-CM

## 2020-06-29 DIAGNOSIS — I10 ESSENTIAL HYPERTENSION: Primary | ICD-10-CM

## 2020-06-29 DIAGNOSIS — Z12.31 ENCOUNTER FOR SCREENING MAMMOGRAM FOR BREAST CANCER: ICD-10-CM

## 2020-06-29 LAB
ALBUMIN SERPL-MCNC: 3.9 G/DL (ref 3.5–5)
ALBUMIN/GLOB SERPL: 1 {RATIO} (ref 1.1–2.2)
ALP SERPL-CCNC: 91 U/L (ref 45–117)
ALT SERPL-CCNC: 22 U/L (ref 12–78)
ANION GAP SERPL CALC-SCNC: 6 MMOL/L (ref 5–15)
AST SERPL-CCNC: 17 U/L (ref 15–37)
BILIRUB SERPL-MCNC: 0.5 MG/DL (ref 0.2–1)
BUN SERPL-MCNC: 24 MG/DL (ref 6–20)
BUN/CREAT SERPL: 25 (ref 12–20)
CALCIUM SERPL-MCNC: 9.3 MG/DL (ref 8.5–10.1)
CHLORIDE SERPL-SCNC: 108 MMOL/L (ref 97–108)
CHOLEST SERPL-MCNC: 275 MG/DL
CO2 SERPL-SCNC: 28 MMOL/L (ref 21–32)
CREAT SERPL-MCNC: 0.95 MG/DL (ref 0.55–1.02)
GLOBULIN SER CALC-MCNC: 3.8 G/DL (ref 2–4)
GLUCOSE SERPL-MCNC: 105 MG/DL (ref 65–100)
HDLC SERPL-MCNC: 111 MG/DL
HDLC SERPL: 2.5 {RATIO} (ref 0–5)
LDLC SERPL CALC-MCNC: 146.4 MG/DL (ref 0–100)
LIPID PROFILE,FLP: ABNORMAL
POTASSIUM SERPL-SCNC: 4 MMOL/L (ref 3.5–5.1)
PROT SERPL-MCNC: 7.7 G/DL (ref 6.4–8.2)
SODIUM SERPL-SCNC: 142 MMOL/L (ref 136–145)
TRIGL SERPL-MCNC: 88 MG/DL (ref ?–150)
VLDLC SERPL CALC-MCNC: 17.6 MG/DL

## 2020-06-29 NOTE — PROGRESS NOTES
1. Have you been to the ER, urgent care clinic since your last visit? Hospitalized since your last visit? No    2. Have you seen or consulted any other health care providers outside of the [address] [place] [address] since your last visit? Include any pap smears or colon screening.  No  Reviewed record in preparation for visit and have necessary documentation  Pt did not bring medication to office visit for review    Goals that were addressed and/or need to be completed during or after this appointment include   Health Maintenance Due   Topic Date Due    DTaP/Tdap/Td series (1 - Tdap) 05/27/1969    Shingrix Vaccine Age 50> (1 of 2) 05/27/1998    GLAUCOMA SCREENING Q2Y  05/27/2013    Pneumococcal 65+ years (1 of 1 - PPSV23) 05/27/2013    Breast Cancer Screen Mammogram  07/25/2019    Medicare Yearly Exam  08/24/2019     Pt declines all vaccines  Has an eye appt scheduled

## 2020-06-29 NOTE — PATIENT INSTRUCTIONS
[address] [facility] with [address] [place] [address], [facility], [place] [address] [facility] 
[phone] Monitor blood pressure outside the office several times weekly at different times during the day and evening. Bring the record to me in 3 weeks for review. Blood Pressure Record Patient Name:  ______________________ :  ______________________ Date/Time BP Reading Pulse Home Blood Pressure Test: About This Test 
What is it? A home blood pressure test allows you to keep track of your blood pressure at home. Blood pressure is a measure of the force of blood against the walls of your arteries. Blood pressure readings include two numbers, such as 130/80 (say \"130 over 80\"). The first number is the systolic pressure. The second number is the diastolic pressure. Why is this test done? You may do this test at home to: · Find out if you have high blood pressure. · Track your blood pressure if you have high blood pressure. · Track how well medicine is working to reduce high blood pressure. · Check how lifestyle changes, such as weight loss and exercise, are affecting blood pressure. How do you prepare for the test? 
For at least 30 minutes before you take your blood pressure, don't exercise or use caffeine, tobacco, or medicines that raise blood pressure. Take your blood pressure while you feel comfortable and relaxed. Sit quietly with both feet on the floor for at least 5 minutes before the test. 
How is the test done? · Sit with your arm slightly bent and resting on a table so that your upper arm is at the same level as your heart. · Roll up your sleeve or take off your shirt to expose your upper arm. · Wrap the blood pressure cuff around your upper arm so that the lower edge of the cuff is about 1 inch above the bend of your elbow. Proceed with the following steps depending on if you are using an automatic or manual pressure monitor. Automatic blood pressure monitors · Press the on/off button on the automatic monitor and wait until the ready-to-measure \"heart\" symbol appears next to zero in the display window. · Press the start button. The cuff will inflate and deflate by itself. · Your blood pressure numbers will appear on the screen. · Write your numbers in your log book, along with the date and time. Manual blood pressure monitors · Place the earpieces of a stethoscope in your ears, and place the bell of the stethoscope over the artery, just below the cuff. · Close the valve on the rubber inflating bulb. · Squeeze the bulb rapidly with your opposite hand to inflate the cuff until the dial or column of mercury reads about 30 mm Hg higher than your usual systolic pressure. If you do not know your usual pressure, inflate the cuff to 210 mm Hg or until the pulse at your wrist disappears. · Open the pressure valve just slightly by twisting or pressing the valve on the bulb. · As you watch the pressure slowly fall, note the level on the dial at which you first start to hear a pulsing or tapping sound through the stethoscope. This is your systolic blood pressure. · Continue letting the air out slowly. The sounds will become muffled and will finally disappear. Note the pressure when the sounds completely disappear. This is your diastolic blood pressure. Let out all the remaining air. · Write your numbers in your log book, along with the date and time. Follow-up care is a key part of your treatment and safety. Be sure to make and go to all appointments, and call your doctor if you are having problems. It's also a good idea to keep a list of the medicines you take. Where can you learn more? Go to http://caprice-carlos.info/ Enter C427 in the search box to learn more about \"Home Blood Pressure Test: About This Test.\" Current as of: December 16, 2019               Content Version: 12.5 © 3329-9607 Healthwise, Incorporated. Care instructions adapted under license by [employer] (which disclaims liability or warranty for this information). If you have questions about a medical condition or this instruction, always ask your healthcare professional. [address] any warranty or liability for your use of this information.

## 2020-06-29 NOTE — PROGRESS NOTES
CC: f/u HTN    HPI: Pt is a 65 y.o. female who presents for f/u HTN. She reports that she is taking her HCTZ daily and her lisinopril on occasion. She feels that when she takes the lisinopril it causes her to have increased urination. HTN:  Checking BPs at home?: YES  Logs today?: NO  Range of BPs?: 973-545'K systolic, doesn't remember diastolics. Adding salt to foods?: NO  Headaches?: NO  Blurry vision?: NO  Lower extremity edema?: NO  Smoking?: NO        Past Medical History:   Diagnosis Date    Essential hypertension 11/2/2016    [facility] [address] [facility]     Seizures ([facility] [address]        Family History   Problem Relation Age of Onset    Stroke Father     Heart Disease Sister        Social History     Tobacco Use    Smoking status: Never Smoker    Smokeless tobacco: Never Used   Substance Use Topics    Alcohol use: Yes    Drug use: No       ROS:  Per HPI    PE:  Visit Vitals  /75 (BP 1 Location: Left arm, BP Patient Position: Sitting)   Pulse 76   Temp 98.8 °F (37.1 °C) (Temporal)   Resp 16   Ht 5' 3\" (1.6 m)   Wt 131 lb (59.4 kg)   SpO2 98%   BMI 23.21 kg/m²     Gen: Pt sitting in chair, in NAD  Head: Normocephalic, atraumatic  Eyes: Sclera anicteric, EOM grossly intact, PERRL  Throat: Mask in place  Neck: Supple, no LAD, no thyromegaly or carotid bruits  CVS: Normal S1, S2, no m/r/g  Resp: CTAB, no wheezes or rales  Extrem: Atraumatic, no cyanosis or edema  Pulses: 2+   Skin: Warm, dry  Neuro: Alert, oriented, appropriate      A/P:   Encounter Diagnoses     ICD-10-CM ICD-9-CM   1. Essential hypertension I10 401.9   2. Mixed hyperlipidemia E78.2 272.2   3. Encounter for screening mammogram for breast cancer Z12.31 V76.12     1. Encounter for screening mammogram for breast cancer  - [facility] MAMMO BI SCREENING INCL CAD; Future    2. Essential hypertension: Poorly controlled. Discussed BP goals with pt based on her age. Discussed risks of uncontrolled BP including but not limited to her kidneys and heart.  Discussed that these risks exist regardless of the mechanism of BP increase. Home values are not at goal either. Discussed importance of taking medication daily otherwise it will not work effectively. Offered to get rid of lisinopril and switch to amlodipine as she is concerned the lisinopril makes her urinate too much. Pt declines for now and states she will call back if she changes her mind. - METABOLIC PANEL, COMPREHENSIVE; Future  - LIPID PANEL; Future    3. HLD: Uncontrolled. Pt declines statin. Will recheck today and recommend statin again if ASCVD risk remains elevated. RTC in 6 months for f/u chronic conditions, or sooner prn. Advised to call back sooner if home BP's are >150/90 or if she decides to try amlodipine. Discussed diagnoses in detail with patient. Medication risks/benefits/side effects discussed with patient. All of the patient's questions were addressed. The patient understands and agrees with our plan of care. The patient knows to call back if they are unsure of or forget any changes we discussed today or if the symptoms change. The patient received an After-Visit Summary which contains VS, orders, medication list and allergy list. This can be used as a \"mini-medical record\" should they have to seek medical care while out of town. Current Outpatient Medications on File Prior to Visit   Medication Sig Dispense Refill    hydroCHLOROthiazide (HYDRODIURIL) 25 mg tablet TAKE 1 TABLET BY MOUTH ONCE DAILY (APPOINTMENT  REQUIRED  FOR  FUTURE  REFILLS) 30 Tab 0    latanoprost (XALATAN) 0.005 % ophthalmic solution INSTILL 1 DROP INTO EACH EYE AT BEDTIME  3    lisinopril (PRINIVIL, ZESTRIL) 10 mg tablet TAKE 1 TABLET BY MOUTH ONCE DAILY 30 Tab 5    phenytoin ER (DILANTIN ER) 100 mg ER capsule TAKE 3 CAPSULES BY MOUTH AT BEDTIME 90 Cap 0    timolol (TIMOPTIC) 0.5 % ophthalmic solution 1 Drop two (2) times a day.  aspirin delayed-release 81 mg tablet Take 1 Tab by mouth daily.  100 Tab 3 No current facility-administered medications on file prior to visit.

## 2020-07-09 ENCOUNTER — HOSPITAL ENCOUNTER (OUTPATIENT)
Dept: MAMMOGRAPHY | Age: 70
Discharge: HOME | End: 2020-07-09
Attending: FAMILY MEDICINE
Payer: MEDICARE

## 2020-07-09 DIAGNOSIS — Z12.31 ENCOUNTER FOR SCREENING MAMMOGRAM FOR BREAST CANCER: ICD-10-CM

## 2020-07-09 PROCEDURE — 77067 SCR MAMMO BI INCL CAD: CPT

## 2020-12-31 ENCOUNTER — VIRTUAL VISIT (OUTPATIENT)
Dept: FAMILY MEDICINE CLINIC | Age: 70
End: 2020-12-31
Payer: MEDICARE

## 2020-12-31 DIAGNOSIS — M41.9 SCOLIOSIS OF THORACOLUMBAR SPINE, UNSPECIFIED SCOLIOSIS TYPE: ICD-10-CM

## 2020-12-31 DIAGNOSIS — M51.36 DDD (DEGENERATIVE DISC DISEASE), LUMBAR: ICD-10-CM

## 2020-12-31 DIAGNOSIS — R10.13 EPIGASTRIC PAIN: Primary | ICD-10-CM

## 2020-12-31 DIAGNOSIS — I10 ESSENTIAL HYPERTENSION: ICD-10-CM

## 2020-12-31 PROBLEM — H40.1133 PRIMARY OPEN ANGLE GLAUCOMA OF BOTH EYES, SEVERE STAGE: Status: ACTIVE | Noted: 2017-02-06

## 2020-12-31 PROCEDURE — 99443 PR PHYS/QHP TELEPHONE EVALUATION 21-30 MIN: CPT | Performed by: FAMILY MEDICINE

## 2020-12-31 NOTE — PROGRESS NOTES
Jackelyn Correia  65 y.o. female  1950  [address]  [address]  [id number]    [phone] (home) [phone] (work)     [facility]:    Telephone Encounter  Ana Merlos MD       Encounter Date: 12/31/2020 at 8:32 AM    Consent:  She and/or the health care decision maker is aware that that she may receive a bill for this telephone service, depending on her insurance coverage, and has provided verbal consent to proceed: Yes    No chief complaint on file. History of Present Illness   Jackelyn Correia is a 65 y.o. female was evaluated by telephone. I communicated with the patient and/or health care decision maker about epigastric pain. She reports that every once in a while she will get a burning pain in her epigastric area. She takes a generic TUMS and it goes away. Tends to happen after she eats a heavy meal and happens a few times a month. She has a h/o dextroscoliosis in the thorcolumbar spine that was diagnosed on XR in 2017 and seen again on MRI of lumbar spine in 2018. She reports that occasionally she will have pain in her lower back or left hip that resolves with rest and an arthritis cream she rubs on the area. She does also mention some numbness in the R leg from time to time and the MRI also showed severe R foraminal stenosis at L4-5 and L5-S1. HTN:  Checking BPs at home?: YES  Logs today?: NO  Range of BPs?: 120-130's/50-80's  Adding salt to foods?: NO  Headaches?: NO  Blurry vision?: NO  Lower extremity edema?: NO  Smoking?: NO      Review of Systems   Per HPI    Vitals/Objective:   General: Patient speaking in complete sentences without effort. Normal speech and cooperative. Due to this being a Virtual Check-in/Telephone evaluation, many elements of the physical examination are unable to be assessed.     Assessment and Plan:   Time-based coding, delete if not needed: I spent at least 15 minutes with this established patient, and >50% of the time was spent counseling and/or coordinating care regarding f/u HTN, epigastric pain and left hip/lower back pain and R leg numbness  Total Time: minutes: 21-30 minutes      1. Epigastric pain: Sounds typical of GERD and symptoms improve with TUMS. Currently as this is happening infrequently, advised it is fine for her to take TUMS prn and also try to avoid foods that set it off. ER precautions given for chest pain, and will plan to evaluate further in office when she comes in.     2. Essential hypertension: Well controlled per home values. Will get labs in a few weeks when she comes in.     3. Scoliosis of thoracolumbar spine, unspecified scoliosis type: Pt states children are concerned about this and would like her to get another XR. Advised that if she is not having a lot of pain from this, the XR would be unlikely to change our management; however the symptoms of R leg numbness with known foraminal narrowing at L4-5 and L5-S1 on that side are more concerning and could warrant further imaging or referral to Ortho. 4. DDD (degenerative disc disease), lumbar      RTC in 2 weeks for in-person evaluation of hip and low back as well as labs. We discussed the expected course, resolution and complications of the diagnosis(es) in detail. Medication risks, benefits, costs, interactions, and alternatives were discussed as indicated. I advised her to contact the office if her condition worsens, changes or fails to improve as anticipated. She expressed understanding with the diagnosis(es) and plan. Patient understands that this encounter was a temporary measure, and the importance of further follow up and examination was emphasized. Patient verbalized understanding. I affirm this is a Patient Initiated Episode with an Established Patient who has not had a related appointment within my department in the past 7 days or scheduled within the next 24 hours.   Note: not billable if this call serves to triage the patient into an appointment for the relevant concern      Electronically Signed: Stacey Red MD  Providers location when delivering service: In the office        ICD-10-CM ICD-9-CM    1. Epigastric pain  R10.13 789.06    2. Essential hypertension  I10 401.9    3. Scoliosis of thoracolumbar spine, unspecified scoliosis type  M41.9 737.30    4. DDD (degenerative disc disease), lumbar  M51.36 722.52        Pursuant to the emergency declaration under the [address] [place] [address] waiver authority and the [employer] Act, this Virtual  Visit was conducted, with patient's consent, to reduce the patient's risk of exposure to COVID-19 and provide continuity of care for an established patient. History     Past Medical History:   Diagnosis Date    Essential hypertension 11/2/2016    [facility] [address]     Seizures ([facility] [address]      No past surgical history on file. Family History   Problem Relation Age of Onset    Stroke Father     Heart Disease Sister      Social History     Socioeconomic History    Marital status:      Spouse name: Not on file    Number of children: Not on file    Years of education: Not on file    Highest education level: Not on file   Occupational History    Not on file   Social Needs    Financial resource strain: Not on file    Food insecurity     Worry: Not on file     Inability: Not on file    Transportation needs     Medical: Not on file     Non-medical: Not on file   Tobacco Use    Smoking status: Never Smoker    Smokeless tobacco: Never Used   Substance and Sexual Activity    Alcohol use:  Yes    Drug use: No    Sexual activity: Yes     Partners: Male   Lifestyle    Physical activity     Days per week: Not on file     Minutes per session: Not on file    Stress: Not on file   Relationships    Social connections     Talks on phone: Not on file     Gets together: Not on file     Attends [employer] service: Not on file     Active member of club or organization: Not on file     Attends meetings of clubs or organizations: Not on file     Relationship status: Not on file    Intimate partner violence     Fear of current or ex partner: Not on file     Emotionally abused: Not on file     Physically abused: Not on file     Forced sexual activity: Not on file   Other Topics Concern    Not on file   Social History Narrative    Not on file            Current Medications/Allergies   Medications and Allergies reviewed:    Current Outpatient Medications   Medication Sig Dispense Refill    lisinopriL (PRINIVIL, ZESTRIL) 10 mg tablet Take 1 tablet by mouth once daily 30 Tab 3    hydroCHLOROthiazide (HYDRODIURIL) 25 mg tablet TAKE 1 TABLET BY MOUTH ONCE DAILY 30 Tab 5    latanoprost (XALATAN) 0.005 % ophthalmic solution INSTILL 1 DROP INTO EACH EYE AT BEDTIME  3    phenytoin ER (DILANTIN ER) 100 mg ER capsule TAKE 3 CAPSULES BY MOUTH AT BEDTIME 90 Cap 0    timolol (TIMOPTIC) 0.5 % ophthalmic solution 1 Drop two (2) times a day.  aspirin delayed-release 81 mg tablet Take 1 Tab by mouth daily.  100 Tab 3     No Known Allergies

## 2021-01-10 ENCOUNTER — TELEPHONE (OUTPATIENT)
Dept: FAMILY MEDICINE CLINIC | Age: 71
End: 2021-01-10

## 2021-01-10 DIAGNOSIS — E78.2 MIXED HYPERLIPIDEMIA: Primary | ICD-10-CM

## 2021-01-10 DIAGNOSIS — I10 ESSENTIAL HYPERTENSION: ICD-10-CM

## 2021-01-11 RX ORDER — HYDROCHLOROTHIAZIDE 25 MG/1
TABLET ORAL
Qty: 30 TAB | Refills: 5 | Status: SHIPPED | OUTPATIENT
Start: 2021-01-11 | End: 2021-07-26

## 2021-01-14 ENCOUNTER — OFFICE VISIT (OUTPATIENT)
Dept: FAMILY MEDICINE CLINIC | Age: 71
End: 2021-01-14
Payer: MEDICARE

## 2021-01-14 VITALS
SYSTOLIC BLOOD PRESSURE: 170 MMHG | HEART RATE: 75 BPM | OXYGEN SATURATION: 100 % | WEIGHT: 131 LBS | TEMPERATURE: 97.2 F | RESPIRATION RATE: 16 BRPM | DIASTOLIC BLOOD PRESSURE: 66 MMHG | BODY MASS INDEX: 23.21 KG/M2 | HEIGHT: 63 IN

## 2021-01-14 DIAGNOSIS — M41.9 SCOLIOSIS OF THORACOLUMBAR SPINE, UNSPECIFIED SCOLIOSIS TYPE: ICD-10-CM

## 2021-01-14 DIAGNOSIS — R10.13 EPIGASTRIC PAIN: Primary | ICD-10-CM

## 2021-01-14 DIAGNOSIS — I10 ESSENTIAL HYPERTENSION: ICD-10-CM

## 2021-01-14 DIAGNOSIS — E78.2 MIXED HYPERLIPIDEMIA: ICD-10-CM

## 2021-01-14 DIAGNOSIS — R20.0 NUMBNESS IN RIGHT LEG: ICD-10-CM

## 2021-01-14 PROCEDURE — G9899 SCRN MAM PERF RSLTS DOC: HCPCS | Performed by: FAMILY MEDICINE

## 2021-01-14 PROCEDURE — G8400 PT W/DXA NO RESULTS DOC: HCPCS | Performed by: FAMILY MEDICINE

## 2021-01-14 PROCEDURE — G8753 SYS BP > OR = 140: HCPCS | Performed by: FAMILY MEDICINE

## 2021-01-14 PROCEDURE — 1090F PRES/ABSN URINE INCON ASSESS: CPT | Performed by: FAMILY MEDICINE

## 2021-01-14 PROCEDURE — G8536 NO DOC ELDER MAL SCRN: HCPCS | Performed by: FAMILY MEDICINE

## 2021-01-14 PROCEDURE — G8510 SCR DEP NEG, NO PLAN REQD: HCPCS | Performed by: FAMILY MEDICINE

## 2021-01-14 PROCEDURE — G8754 DIAS BP LESS 90: HCPCS | Performed by: FAMILY MEDICINE

## 2021-01-14 PROCEDURE — G8427 DOCREV CUR MEDS BY ELIG CLIN: HCPCS | Performed by: FAMILY MEDICINE

## 2021-01-14 PROCEDURE — 99214 OFFICE O/P EST MOD 30 MIN: CPT | Performed by: FAMILY MEDICINE

## 2021-01-14 PROCEDURE — G8420 CALC BMI NORM PARAMETERS: HCPCS | Performed by: FAMILY MEDICINE

## 2021-01-14 PROCEDURE — 3017F COLORECTAL CA SCREEN DOC REV: CPT | Performed by: FAMILY MEDICINE

## 2021-01-14 PROCEDURE — 1101F PT FALLS ASSESS-DOCD LE1/YR: CPT | Performed by: FAMILY MEDICINE

## 2021-01-14 NOTE — PATIENT INSTRUCTIONS
DASH Diet: Care Instructions Your Care Instructions The DASH diet is an eating plan that can help lower your blood pressure. DASH stands for Dietary Approaches to Stop Hypertension. Hypertension is high blood pressure. The DASH diet focuses on eating foods that are high in calcium, potassium, and magnesium. These nutrients can lower blood pressure. The foods that are highest in these nutrients are fruits, vegetables, low-fat dairy products, nuts, seeds, and legumes. But taking calcium, potassium, and magnesium supplements instead of eating foods that are high in those nutrients does not have the same effect. The DASH diet also includes whole grains, fish, and poultry. The DASH diet is one of several lifestyle changes your doctor may recommend to lower your high blood pressure. Your doctor may also want you to decrease the amount of sodium in your diet. Lowering sodium while following the DASH diet can lower blood pressure even further than just the DASH diet alone. Follow-up care is a key part of your treatment and safety. Be sure to make and go to all appointments, and call your doctor if you are having problems. It's also a good idea to know your test results and keep a list of the medicines you take. How can you care for yourself at home? Following the DASH diet · Eat 4 to 5 servings of fruit each day. A serving is 1 medium-sized piece of fruit, ½ cup chopped or canned fruit, 1/4 cup dried fruit, or 4 ounces (½ cup) of fruit juice. Choose fruit more often than fruit juice. · Eat 4 to 5 servings of vegetables each day. A serving is 1 cup of lettuce or raw leafy vegetables, ½ cup of chopped or cooked vegetables, or 4 ounces (½ cup) of vegetable juice. Choose vegetables more often than vegetable juice. · Get 2 to 3 servings of low-fat and fat-free dairy each day. A serving is 8 ounces of milk, 1 cup of yogurt, or 1 ½ ounces of cheese. · Eat 6 to 8 servings of grains each day. A serving is 1 slice of bread, 1 ounce of dry cereal, or ½ cup of cooked rice, pasta, or cooked cereal. Try to choose whole-grain products as much as possible. · Limit lean meat, poultry, and fish to 2 servings each day. A serving is 3 ounces, about the size of a deck of cards. · Eat 4 to 5 servings of nuts, seeds, and legumes (cooked dried beans, lentils, and split peas) each week. A serving is 1/3 cup of nuts, 2 tablespoons of seeds, or ½ cup of cooked beans or peas. · Limit fats and oils to 2 to 3 servings each day. A serving is 1 teaspoon of vegetable oil or 2 tablespoons of salad dressing. · Limit sweets and added sugars to 5 servings or less a week. A serving is 1 tablespoon jelly or jam, ½ cup sorbet, or 1 cup of lemonade. · Eat less than 2,300 milligrams (mg) of sodium a day. If you limit your sodium to 1,500 mg a day, you can lower your blood pressure even more. Tips for success · Start small. Do not try to make dramatic changes to your diet all at once. You might feel that you are missing out on your favorite foods and then be more likely to not follow the plan. Make small changes, and stick with them. Once those changes become habit, add a few more changes. · Try some of the following: ? Make it a goal to eat a fruit or vegetable at every meal and at snacks. This will make it easy to get the recommended amount of fruits and vegetables each day. ? Try yogurt topped with fruit and nuts for a snack or healthy dessert. ? Add lettuce, tomato, cucumber, and onion to sandwiches. ? Combine a ready-made pizza crust with low-fat mozzarella cheese and lots of vegetable toppings. Try using tomatoes, squash, spinach, broccoli, carrots, cauliflower, and onions. ? Have a variety of cut-up vegetables with a low-fat dip as an appetizer instead of chips and dip. ? Sprinkle sunflower seeds or chopped almonds over salads. Or try adding chopped walnuts or almonds to cooked vegetables. ? Try some vegetarian meals using beans and peas. Add garbanzo or kidney beans to salads. Make burritos and tacos with mashed hatfield beans or black beans. Where can you learn more? Go to http://www.gray.com/ Enter U245 in the search box to learn more about \"DASH Diet: Care Instructions. \" Current as of: December 16, 2019               Content Version: 12.6 © 6022-6336 [employer]. Care instructions adapted under license by [employer] (which disclaims liability or warranty for this information). If you have questions about a medical condition or this instruction, always ask your healthcare professional. [address] any warranty or liability for your use of this information.

## 2021-01-14 NOTE — PROGRESS NOTES
1. Have you been to the ER, urgent care clinic since your last visit? Hospitalized since your last visit? No    2. Have you seen or consulted any other health care providers outside of the [address] [place] [address] since your last visit? Include any pap smears or colon screening.  No  Reviewed record in preparation for visit and have necessary documentation  Pt did not bring medication to office visit for review    Goals that were addressed and/or need to be completed during or after this appointment include   Health Maintenance Due   Topic Date Due    Shingrix Vaccine Age 50> (1 of 2) 05/27/1998    GLAUCOMA SCREENING Q2Y  05/27/2013    Medicare Yearly Exam  08/24/2019    Flu Vaccine (1) 09/01/2020

## 2021-01-14 NOTE — PROGRESS NOTES
CC: f/u scoliosis, epigastric pain and HTN    HPI: Pt is a 65 y.o. female who presents for f/u scoliosis, epigastric pain and HTN. HTN: BP's variable and at home range from 185'S-416'U systolic, with most in the 130-140's range. She did take her medication today. Due for labs. Epigastric pain: Feels like a burning, worse at night. Not every day but some days. Relieved by generic TUMS and associated with belching. At our last visit we discussed that it is very likely GERD, but she was concerned and wanted to come in for an EKG. Scoliosis: Noted in 2017 on XR and again in 2018 on MRI. Also with lumbar DDD. Occasionally she has pain in her lower back but normally is fine. She does also sometimes get numbness in her R leg. Her children were concerned and wanted her to get another XR to check on this. Past Medical History:   Diagnosis Date    Essential hypertension 11/2/2016    Seizure ([address]     Seizures ([facility] [address]        Family History   Problem Relation Age of Onset    Stroke Father     Heart Disease Sister        Social History     Tobacco Use    Smoking status: Never Smoker    Smokeless tobacco: Never Used   Substance Use Topics    Alcohol use: Yes    Drug use: No       ROS:  Per HPI    PE:  Visit Vitals  BP (!) 170/66   Pulse 75   Temp 97.2 °F (36.2 °C) (Temporal)   Resp 16   Ht 5' 3\" (1.6 m)   Wt 131 lb (59.4 kg)   SpO2 100%   BMI 23.21 kg/m²     Gen: Pt sitting in chair, in NAD  Head: Normocephalic, atraumatic  Eyes: Sclera anicteric, EOM grossly intact, PERRL  Throat: Mask in place  Neck: Supple, no LAD, no thyromegaly or carotid bruits  CVS: Normal S1, S2, no m/r/g  Resp: CTAB, no wheezes or rales  Abd: Soft, non-tender, non-distended, +BS  Extrem: Atraumatic, no cyanosis or edema  Pulses: 2+   Skin: Warm, dry  Neuro: Alert, oriented, appropriate    EKG: I personally reviewed and interpreted this EKG. NSR. No acute changes when compared to previous EKG from 2018.        A/P:   Encounter Diagnoses     ICD-10-CM ICD-9-CM   1. Epigastric pain  R10.13 789.06   2. Scoliosis of thoracolumbar spine, unspecified scoliosis type  M41.9 737.30   3. Numbness in right leg  R20.0 782.0   4. Essential hypertension  I10 401.9   5. Mixed hyperlipidemia  E78.2 272.2     1. Epigastric pain: EKG without acute changes. Reassurance provided, however did advise that an EKG is only a snapshot in time and if she develops chest pain that is not improving with TUMS, may need to do further work-up. - AMB POC EKG ROUTINE W/ 12 LEADS, INTER & REP    2. Scoliosis of thoracolumbar spine, unspecified scoliosis type: Will repeat imaging, however given her age and relative lack of symptoms it will unlikely change the management of the scoliosis. Worsening DDD could be causing her R leg numbness, however, which could warrant referral to Ortho if she is amenable to this. - XR SPINE LUMB 2 OR 3 V; Future  - XR SPINE THORAC SNGL V; Future    3. Numbness in right leg  - XR SPINE LUMB 2 OR 3 V; Future    4. Essential hypertension: Difficult to control with wide range of pressures. Pt and  advised to continue keeping a check on it and let me know if it is no longer going into the 110-120's, or if it is staying >150, in which case we can adjust her medications. Also given handout on DASH diet. For now will continue current regimen to avoid dropping her too low. - METABOLIC PANEL, BASIC; Future  - METABOLIC PANEL, BASIC    5. Mixed hyperlipidemia  - LIPID PANEL; Future  - LIPID PANEL       RTC in 3 months for f/u HTN, or sooner prn    Discussed diagnoses in detail with patient. Medication risks/benefits/side effects discussed with patient. All of the patient's questions were addressed. The patient understands and agrees with our plan of care. The patient knows to call back if they are unsure of or forget any changes we discussed today or if the symptoms change.   The patient received an After-Visit Summary which contains VS, orders, medication list and allergy list. This can be used as a \"mini-medical record\" should they have to seek medical care while out of town. Current Outpatient Medications on File Prior to Visit   Medication Sig Dispense Refill    hydroCHLOROthiazide (HYDRODIURIL) 25 mg tablet Take 1 tablet by mouth once daily 30 Tab 5    lisinopriL (PRINIVIL, ZESTRIL) 10 mg tablet Take 1 tablet by mouth once daily 30 Tab 3    latanoprost (XALATAN) 0.005 % ophthalmic solution INSTILL 1 DROP INTO EACH EYE AT BEDTIME  3    phenytoin ER (DILANTIN ER) 100 mg ER capsule TAKE 3 CAPSULES BY MOUTH AT BEDTIME 90 Cap 0    timolol (TIMOPTIC) 0.5 % ophthalmic solution 1 Drop two (2) times a day.  aspirin delayed-release 81 mg tablet Take 1 Tab by mouth daily. 100 Tab 3     No current facility-administered medications on file prior to visit.

## 2021-01-15 ENCOUNTER — TELEPHONE (OUTPATIENT)
Dept: FAMILY MEDICINE CLINIC | Age: 71
End: 2021-01-15

## 2021-01-15 LAB
ANION GAP SERPL CALC-SCNC: 5 MMOL/L (ref 5–15)
BUN SERPL-MCNC: 17 MG/DL (ref 6–20)
BUN/CREAT SERPL: 20 (ref 12–20)
CALCIUM SERPL-MCNC: 9.3 MG/DL (ref 8.5–10.1)
CHLORIDE SERPL-SCNC: 104 MMOL/L (ref 97–108)
CHOLEST SERPL-MCNC: 282 MG/DL
CO2 SERPL-SCNC: 31 MMOL/L (ref 21–32)
CREAT SERPL-MCNC: 0.86 MG/DL (ref 0.55–1.02)
GLUCOSE SERPL-MCNC: 79 MG/DL (ref 65–100)
HDLC SERPL-MCNC: 104 MG/DL
HDLC SERPL: 2.7 {RATIO} (ref 0–5)
LDLC SERPL CALC-MCNC: 156 MG/DL (ref 0–100)
LIPID PROFILE,FLP: ABNORMAL
POTASSIUM SERPL-SCNC: 3.8 MMOL/L (ref 3.5–5.1)
SODIUM SERPL-SCNC: 140 MMOL/L (ref 136–145)
TRIGL SERPL-MCNC: 110 MG/DL (ref ?–150)
VLDLC SERPL CALC-MCNC: 22 MG/DL

## 2021-01-15 NOTE — TELEPHONE ENCOUNTER
Called to advise pt of recent results. Left message to call back. She still has arthritis and scoliosis and the Radiologist did not say that these were worsening. If she is managing for now, we can keep an eye on it. If she is having significant numbness in her right leg, that could be coming from her back, and we could send her to Ortho for them to look at the films and decide if there is something we need to do about this. Her labs are stable. Her cholesterol is still high and I would still recommend she try a medication for this to lower her risk of heart attacks and strokes.  Her kidney function and electrolytes are normal.

## 2021-01-15 NOTE — TELEPHONE ENCOUNTER
Informed pt per Dr Cleaning Ped   She still has arthritis and scoliosis and the Radiologist did not say that these were worsening. If she is managing for now, we can keep an eye on it. If she is having significant numbness in her right leg, that could be coming from her back, and we could send her to Ortho for them to look at the films and decide if there is something we need to do about this.     Her labs are stable. Her cholesterol is still high and I would still recommend she try a medication for this to lower her risk of heart attacks and strokes.  Her kidney function and electrolytes are normal.     She has no significant numbness and would like to try cholesterol medication  She requests results be mailed to her home

## 2021-01-18 RX ORDER — ATORVASTATIN CALCIUM 20 MG/1
20 TABLET, FILM COATED ORAL DAILY
Qty: 30 TAB | Refills: 2 | Status: SHIPPED | OUTPATIENT
Start: 2021-01-18 | End: 2021-06-01

## 2021-01-29 ENCOUNTER — TELEPHONE (OUTPATIENT)
Dept: FAMILY MEDICINE CLINIC | Age: 71
End: 2021-01-29

## 2021-02-01 ENCOUNTER — VIRTUAL VISIT (OUTPATIENT)
Dept: FAMILY MEDICINE CLINIC | Age: 71
End: 2021-02-01

## 2021-02-01 ENCOUNTER — TELEPHONE (OUTPATIENT)
Dept: FAMILY MEDICINE CLINIC | Age: 71
End: 2021-02-01

## 2021-02-01 NOTE — TELEPHONE ENCOUNTER
----- Message from Mary Harmon sent at 2/1/2021 10:06 AM EST -----  Regarding: Appointment 02/01/21  Contact: [phone]  Escalated Issue      Caller's first and last name and relationship (if not the patient): DAYAMI      Best contact number(s):[phone]        What are the symptoms: GI Issues. Transfer successful - yes/no (include outcome): DAYAMI      Transfer declined - yes/no (include reason):NA      Was caller advised to seek appropriate level of care - yes/no: NA      Details to clarify the request: Patient scheduled for appointment at  09:40 AM. Patient home phone not working, needing to call cell at [phone]  for appointment to be completed. Tried to contact office to provide information, no answer.         Mary Harmon

## 2021-02-01 NOTE — PROGRESS NOTES
Error - called patient multiple times on all numbers but home number was out of service, no answer x3 on cell number and she wasn't available on the number listed as her work number.

## 2021-02-02 ENCOUNTER — TELEPHONE (OUTPATIENT)
Dept: FAMILY MEDICINE CLINIC | Age: 71
End: 2021-02-02

## 2021-02-02 NOTE — TELEPHONE ENCOUNTER
----- Message from Maddy Lopez sent at 2/2/2021  8:27 AM EST -----  Regarding: Dr. Alyssia Barrios  General Message/Vendor Calls    Caller's first and last name:      Reason for call: lab results       Callback required yes/no and why: yes       Best contact number(s):[phone]      Details to clarify the request: Patient would like a callback regarding their labs      Maddy Lopez

## 2021-02-02 NOTE — TELEPHONE ENCOUNTER
Spoke with pt   She reports some issues with loss of appetite and being \"tired\"  She has an appt with PCP on 2-8-21 to discuss

## 2021-02-08 ENCOUNTER — VIRTUAL VISIT (OUTPATIENT)
Dept: FAMILY MEDICINE CLINIC | Age: 71
End: 2021-02-08
Payer: MEDICARE

## 2021-02-08 DIAGNOSIS — M51.36 DDD (DEGENERATIVE DISC DISEASE), LUMBAR: ICD-10-CM

## 2021-02-08 DIAGNOSIS — F41.9 ANXIETY: Primary | ICD-10-CM

## 2021-02-08 DIAGNOSIS — E78.2 MIXED HYPERLIPIDEMIA: ICD-10-CM

## 2021-02-08 PROCEDURE — 99443 PR PHYS/QHP TELEPHONE EVALUATION 21-30 MIN: CPT | Performed by: FAMILY MEDICINE

## 2021-02-08 RX ORDER — HYDROXYZINE 25 MG/1
25 TABLET, FILM COATED ORAL
Qty: 30 TAB | Refills: 3 | Status: SHIPPED | OUTPATIENT
Start: 2021-02-08

## 2021-02-08 NOTE — PROGRESS NOTES
Ibeth Gaitan  65 y.o. female  1950  [address]  [address]  [id number]    [phone] (home) [phone] (work)     [facility]:    Telephone Encounter  Yeni Piedra MD       Encounter Date: 2/8/2021 at 2:43 PM    Consent:  She and/or the health care decision maker is aware that that she may receive a bill for this telephone service, depending on her insurance coverage, and has provided verbal consent to proceed: Yes    No chief complaint on file. History of Present Illness   Ibeth Gaitan is a 65 y.o. female was evaluated by telephone. I communicated with the patient and/or health care decision maker about anxiety. She has been feeling overwhelmed and states that sometimes it feels like the anxiety takes over her whole body. It has been coming and going for the past 2 weeks, with last episode 5 days ago. Sometimes makes it hard for her to get to sleep because her mind is racing. Symptoms improve when she does something to take her mind off of it. She denies any recent increase in stress, however she has had more medical appts than normal and recently got an EKG to work-up chest pain. She had a similar episode of symptoms last year and states she had a work-up with Neurology which was unrevealing. She is wondering if the nerve compression from her lumbar DDD could be causing these symptoms. She denies any pain in her back or legs, numbness or tingling. She has started back on her atorvastatin and is not having any side effects. Review of Systems   Per HPI    Vitals/Objective:   General: Patient speaking in complete sentences without effort. Normal speech and cooperative. Due to this being a Virtual Check-in/Telephone evaluation, many elements of the physical examination are unable to be assessed.     Assessment and Plan:   Time-based coding, delete if not needed: I spent at least 15 minutes with this established patient, and >50% of the time was spent counseling and/or coordinating care regarding anxiety, lumbar DDD, HLD  Total Time: minutes: 21-30 minutes      1. Anxiety: Discussed with pt that symptoms seem most c/w anxiety, and possibly panic attacks. Given that the same thing happened last year and resolved on its own after a few weeks, will do a trial of prn medication as opposed to an SSRI or other daily medication for anxiety. If symptoms do not improve with this can discuss daily therapy.   - hydrOXYzine HCL (ATARAX) 25 mg tablet; Take 1 Tab by mouth three (3) times daily as needed for Anxiety.  Dispense: 30 Tab; Refill: 3    2. DDD (degenerative disc disease), lumbar: Discussed the difference between the colloquial \"nerves\" meaning anxiety and the physical nerves coming out of our spine. Discussed symptoms that I would expect from a pinched nerve and contrasted those with symptoms of anxiety. Pt is not currently having any symptoms from her DDD    3. Mixed hyperlipidemia: Continue atorvastatin. Will plan to recheck labs in about 6 months.       RTC in 6 months for f/u chronic conditions, or sooner prn    We discussed the expected course, resolution and complications of the diagnosis(es) in detail.  Medication risks, benefits, costs, interactions, and alternatives were discussed as indicated.  I advised her to contact the office if her condition worsens, changes or fails to improve as anticipated. She expressed understanding with the diagnosis(es) and plan. Patient understands that this encounter was a temporary measure, and the importance of further follow up and examination was emphasized.  Patient verbalized understanding.         I affirm this is a Patient Initiated Episode with an Established Patient who has not had a related appointment within my department in the past 7 days or scheduled within the next 24 hours.  Note: not billable if this call serves to triage the patient into an appointment for the relevant  concern      Electronically Signed: Lolis Blankenship MD  Providers location when delivering service: In the office        ICD-10-CM ICD-9-CM    1. Anxiety  F41.9 300.00 hydrOXYzine HCL (ATARAX) 25 mg tablet   2. DDD (degenerative disc disease), lumbar  M51.36 722.52    3. Mixed hyperlipidemia  E78.2 272.2        Pursuant to the emergency declaration under the [address] [place] [address] [facility] authority and the [employer] Act, this Virtual  Visit was conducted, with patient's consent, to reduce the patient's risk of exposure to COVID-19 and provide continuity of care for an established patient. History     Past Medical History:   Diagnosis Date    Essential hypertension 11/2/2016    [facility] ([facility] [address]     Seizures ([facility] [address]      No past surgical history on file. Family History   Problem Relation Age of Onset    Stroke Father     Heart Disease Sister      Social History     Socioeconomic History    Marital status:      Spouse name: Not on file    Number of children: Not on file    Years of education: Not on file    Highest education level: Not on file   Occupational History    Not on file   Social Needs    Financial resource strain: Not on file    Food insecurity     Worry: Not on file     Inability: Not on file    Transportation needs     Medical: Not on file     Non-medical: Not on file   Tobacco Use    Smoking status: Never Smoker    Smokeless tobacco: Never Used   Substance and Sexual Activity    Alcohol use:  Yes    Drug use: No    Sexual activity: Yes     Partners: Male   Lifestyle    Physical activity     Days per week: Not on file     Minutes per session: Not on file    Stress: Not on file   Relationships    Social connections     Talks on phone: Not on file     Gets together: Not on file     Attends Islam service: Not on file     Active member of club or organization: Not on file     Attends meetings of clubs or organizations: Not on file     Relationship status: Not on file    Intimate partner violence     Fear of current or ex partner: Not on file     Emotionally abused: Not on file     Physically abused: Not on file     Forced sexual activity: Not on file   Other Topics Concern    Not on file   Social History Narrative    Not on file            Current Medications/Allergies   Medications and Allergies reviewed:    Current Outpatient Medications   Medication Sig Dispense Refill    hydrOXYzine HCL (ATARAX) 25 mg tablet Take 1 Tab by mouth three (3) times daily as needed for Anxiety. 30 Tab 3    atorvastatin (LIPITOR) 20 mg tablet Take 1 Tab by mouth daily. Will need labs checked after 3 months. 30 Tab 2    hydroCHLOROthiazide (HYDRODIURIL) 25 mg tablet Take 1 tablet by mouth once daily 30 Tab 5    lisinopriL (PRINIVIL, ZESTRIL) 10 mg tablet Take 1 tablet by mouth once daily 30 Tab 3    latanoprost (XALATAN) 0.005 % ophthalmic solution INSTILL 1 DROP INTO EACH EYE AT BEDTIME  3    phenytoin ER (DILANTIN ER) 100 mg ER capsule TAKE 3 CAPSULES BY MOUTH AT BEDTIME 90 Cap 0    timolol (TIMOPTIC) 0.5 % ophthalmic solution 1 Drop two (2) times a day.  aspirin delayed-release 81 mg tablet Take 1 Tab by mouth daily.  100 Tab 3     No Known Allergies

## 2021-04-05 ENCOUNTER — TELEPHONE (OUTPATIENT)
Dept: FAMILY MEDICINE CLINIC | Age: 71
End: 2021-04-05

## 2021-04-05 NOTE — TELEPHONE ENCOUNTER
----- Message from Wyona Pallas sent at 4/5/2021 12:30 PM EDT -----  Regarding: Dr. Adelfo [place] / phone  Appointment not available    Caller's first and last name and relationship to patient (if not the patient): n/a      Best contact number:[phone]    Preferred date and time: today     Scheduled appointment date and time: 4/15/21 3:30 pm      Reason for appointment: pt is not feeling right and wanting to see someone today.       Details to clarify the request: Pt is not feeling right and wanting to see someone in practice today      Wyona Pallas

## 2021-04-06 ENCOUNTER — OFFICE VISIT (OUTPATIENT)
Dept: FAMILY MEDICINE CLINIC | Age: 71
End: 2021-04-06
Payer: MEDICARE

## 2021-04-06 VITALS
WEIGHT: 124 LBS | HEART RATE: 71 BPM | TEMPERATURE: 96.6 F | SYSTOLIC BLOOD PRESSURE: 136 MMHG | HEIGHT: 63 IN | DIASTOLIC BLOOD PRESSURE: 71 MMHG | RESPIRATION RATE: 15 BRPM | BODY MASS INDEX: 21.97 KG/M2 | OXYGEN SATURATION: 98 %

## 2021-04-06 DIAGNOSIS — G40.909 SEIZURE DISORDER (HCC): ICD-10-CM

## 2021-04-06 DIAGNOSIS — R00.2 PALPITATIONS: ICD-10-CM

## 2021-04-06 DIAGNOSIS — F41.9 ANXIETY: Primary | ICD-10-CM

## 2021-04-06 DIAGNOSIS — I10 ESSENTIAL HYPERTENSION: ICD-10-CM

## 2021-04-06 PROCEDURE — 99214 OFFICE O/P EST MOD 30 MIN: CPT | Performed by: STUDENT IN AN ORGANIZED HEALTH CARE EDUCATION/TRAINING PROGRAM

## 2021-04-06 PROCEDURE — 1101F PT FALLS ASSESS-DOCD LE1/YR: CPT | Performed by: STUDENT IN AN ORGANIZED HEALTH CARE EDUCATION/TRAINING PROGRAM

## 2021-04-06 PROCEDURE — G8420 CALC BMI NORM PARAMETERS: HCPCS | Performed by: STUDENT IN AN ORGANIZED HEALTH CARE EDUCATION/TRAINING PROGRAM

## 2021-04-06 PROCEDURE — G8536 NO DOC ELDER MAL SCRN: HCPCS | Performed by: STUDENT IN AN ORGANIZED HEALTH CARE EDUCATION/TRAINING PROGRAM

## 2021-04-06 PROCEDURE — G8754 DIAS BP LESS 90: HCPCS | Performed by: STUDENT IN AN ORGANIZED HEALTH CARE EDUCATION/TRAINING PROGRAM

## 2021-04-06 PROCEDURE — G8752 SYS BP LESS 140: HCPCS | Performed by: STUDENT IN AN ORGANIZED HEALTH CARE EDUCATION/TRAINING PROGRAM

## 2021-04-06 PROCEDURE — G8510 SCR DEP NEG, NO PLAN REQD: HCPCS | Performed by: STUDENT IN AN ORGANIZED HEALTH CARE EDUCATION/TRAINING PROGRAM

## 2021-04-06 PROCEDURE — G8400 PT W/DXA NO RESULTS DOC: HCPCS | Performed by: STUDENT IN AN ORGANIZED HEALTH CARE EDUCATION/TRAINING PROGRAM

## 2021-04-06 PROCEDURE — G9899 SCRN MAM PERF RSLTS DOC: HCPCS | Performed by: STUDENT IN AN ORGANIZED HEALTH CARE EDUCATION/TRAINING PROGRAM

## 2021-04-06 PROCEDURE — G8427 DOCREV CUR MEDS BY ELIG CLIN: HCPCS | Performed by: STUDENT IN AN ORGANIZED HEALTH CARE EDUCATION/TRAINING PROGRAM

## 2021-04-06 PROCEDURE — 3017F COLORECTAL CA SCREEN DOC REV: CPT | Performed by: STUDENT IN AN ORGANIZED HEALTH CARE EDUCATION/TRAINING PROGRAM

## 2021-04-06 PROCEDURE — 1090F PRES/ABSN URINE INCON ASSESS: CPT | Performed by: STUDENT IN AN ORGANIZED HEALTH CARE EDUCATION/TRAINING PROGRAM

## 2021-04-06 RX ORDER — LISINOPRIL 10 MG/1
10 TABLET ORAL DAILY
Qty: 30 TAB | Refills: 3 | Status: SHIPPED | OUTPATIENT
Start: 2021-04-06 | End: 2021-10-12 | Stop reason: SDUPTHER

## 2021-04-06 NOTE — PATIENT INSTRUCTIONS

## 2021-04-06 NOTE — PROGRESS NOTES
Progress Note    Patient: Víctor Sloan MRN: [id number]  SSN: [id number]    YOB: 1950  Age: 65 y.o. Sex: female        Chief Complaint   Patient presents with    Irregular Heart Beat     for the past week more frequently    Anxiety     has episodes of anxiety as well         Subjective:     Problems addressed:  Encounter Diagnoses     ICD-10-CM ICD-9-CM   1. Anxiety  F41.9 300.00   2. Palpitations  R00.2 785.1   3. Essential hypertension  I10 401.9   4. Seizure disorder ([facility] [address]  G40.909 345.90       HPI: 65 y.o. y/o female with PMH of HTN, anxiety, and seizures presents for palpitations. Pt reports occasional episodes at home where she feels fluttering and uncomfortable feeling in her chest. Was seen previously for this and had a normal EKG at that time. Has been taking hydroxyzine for anxiety which \"makes me worse, then makes me better\". Pt reports her seizure disorder is well controlled on phenytoin. Last seizure: 5-6 years ago. Denies any fever, chills, cough, SOB, sick contacts, or recent travel. Current and past medical information:    Current Medications after this visit[de-identified]     Current Outpatient Medications   Medication Sig    lisinopriL (PRINIVIL, ZESTRIL) 10 mg tablet Take 1 Tab by mouth daily.  hydrOXYzine HCL (ATARAX) 25 mg tablet Take 1 Tab by mouth three (3) times daily as needed for Anxiety.  atorvastatin (LIPITOR) 20 mg tablet Take 1 Tab by mouth daily. Will need labs checked after 3 months.  hydroCHLOROthiazide (HYDRODIURIL) 25 mg tablet Take 1 tablet by mouth once daily    latanoprost (XALATAN) 0.005 % ophthalmic solution INSTILL 1 DROP INTO EACH EYE AT BEDTIME    phenytoin ER (DILANTIN ER) 100 mg ER capsule TAKE 3 CAPSULES BY MOUTH AT BEDTIME    timolol (TIMOPTIC) 0.5 % ophthalmic solution 1 Drop two (2) times a day.  aspirin delayed-release 81 mg tablet Take 1 Tab by mouth daily. No current facility-administered medications for this visit.         Patient Active Problem List    Diagnosis Date Noted    Primary open angle glaucoma of both eyes, severe stage 02/06/2017    Essential hypertension 11/02/2016    Primary open angle glaucoma 08/01/2016    Age-related nuclear cataract of both eyes 03/10/2016    Seizure disorder ([facility] [address] 06/21/2013       Past Medical History:   Diagnosis Date    Essential hypertension 11/2/2016    Seizure ([address]     Seizures ([facility] [address]        No Known Allergies    History reviewed. No pertinent surgical history. Social History     Tobacco Use    Smoking status: Never Smoker    Smokeless tobacco: Never Used   Substance Use Topics    Alcohol use: Yes    Drug use: No         Review of Systems   Constitutional: Negative for chills and fever. HENT: Negative for congestion and sore throat. Respiratory: Negative for cough and shortness of breath. Cardiovascular: Positive for palpitations. Negative for leg swelling. Gastrointestinal: Negative for abdominal pain, nausea and vomiting. Musculoskeletal: Negative for falls and myalgias. Neurological: Negative for dizziness, tingling, loss of consciousness and headaches. Objective:     Vitals:    04/06/21 1531 04/06/21 1610   BP: (!) 156/74 136/71   Pulse: 79 71   Resp: 15    Temp: (!) 96.6 °F (35.9 °C)    TempSrc: Temporal    SpO2: 98%    Weight: 124 lb (56.2 kg)    Height: 5' 3\" (1.6 m)       Body mass index is 21.97 kg/m². Physical Exam  Constitutional:       General: She is not in acute distress. Appearance: Normal appearance. She is not diaphoretic. HENT:      Head: Normocephalic and atraumatic. Right Ear: External ear normal.      Left Ear: External ear normal.      Nose: Nose normal. No rhinorrhea. Eyes:      Conjunctiva/sclera: Conjunctivae normal.      Pupils: Pupils are equal, round, and reactive to light. Neck:      Musculoskeletal: Neck supple. Cardiovascular:      Rate and Rhythm: Normal rate and regular rhythm. Heart sounds: No murmur. Pulmonary:      Effort: Pulmonary effort is normal. No respiratory distress. Breath sounds: No wheezing or rhonchi. Musculoskeletal:      Right lower leg: No edema. Left lower leg: No edema. Skin:     General: Skin is warm and dry. Findings: No erythema or rash. Neurological:      Mental Status: She is alert. Psychiatric:         Mood and Affect: Mood is anxious. Health Maintenance Due   Topic Date Due    COVID-19 Vaccine (1) Never done    Medicare Yearly Exam  08/24/2019       Assessment and orders:     Encounter Diagnoses     ICD-10-CM ICD-9-CM   1. Anxiety  F41.9 300.00   2. Palpitations  R00.2 785.1   3. Essential hypertension  I10 401.9   4. Seizure disorder ([facility] [address]  G40.909 345.90       66 y/o F with continued palpitations with recent normal EKG and normal exam, will set up holter monitor and continue hydroxyzine PRN for anxiety symptoms     1. Palpitations  - AMB POC EKG 24HR MONITORING    2. Anxiety  -Continue hydroxyzine PRN    3. Essential hypertension  BP Readings from Last 3 Encounters:   04/06/21 136/71   01/14/21 (!) 170/66   06/29/20 171/75     -REFILLED lisinopriL (PRINIVIL, ZESTRIL) 10 mg tablet; Take 1 Tab by mouth daily. Dispense: 30 Tab; Refill: 3  -Continue HCTZ 25mg    4. Seizure disorder ([facility] [address] - well controlled  -Continue phenytoin 100mg 3 caps nightly           Plan of care:  Discussed diagnoses in detail with patient. Medication risks/benefits/side effects discussed with patient. All of the patient's questions were addressed. The patient understands and agrees with our plan of care. The patient knows to call back if they are unsure of or forget any changes we discussed today or if the symptoms change. The patient received an After-Visit Summary which contains VS, orders, medication list and allergy list. This can be used as a \"mini-medical record\" should they have to seek medical care while out of town.     Follow-up and Dispositions · Return in about 1 month (around 5/6/2021), or if symptoms worsen or fail to improve, for Blood Pressure, Palpitations, Anxiety.            Future Appointments   Date Time Provider Ryan Andrea   4/15/2021  3:30 PM Ridge Mcdaniels MD [facility]B       Signed By: Daja Edwards MD     April 6, 2021

## 2021-04-06 NOTE — PROGRESS NOTES
1. Have you been to the ER, urgent care clinic, or been hospitalized since your last visit?  no    2. Have you seen or consulted any other health care providers outside of the [address] [place] [address] since your last visit? No     Reviewed record in preparation for visit and have necessary documentation  Goals that were addressed and/or need to be completed during or after this appointment include   Health Maintenance Due   Topic Date Due    COVID-19 Vaccine (1) Never done    Medicare Yearly Exam  08/24/2019       I have received verbal consent from Antoine Esquivel to discuss any/all medical information while others present in the room.

## 2021-04-08 PROBLEM — F41.9 ANXIETY: Status: ACTIVE | Noted: 2021-04-08

## 2021-04-15 ENCOUNTER — OFFICE VISIT (OUTPATIENT)
Dept: FAMILY MEDICINE CLINIC | Age: 71
End: 2021-04-15
Payer: MEDICARE

## 2021-04-15 VITALS
SYSTOLIC BLOOD PRESSURE: 147 MMHG | RESPIRATION RATE: 18 BRPM | HEART RATE: 76 BPM | OXYGEN SATURATION: 98 % | BODY MASS INDEX: 21.16 KG/M2 | DIASTOLIC BLOOD PRESSURE: 60 MMHG | WEIGHT: 119.4 LBS | TEMPERATURE: 98.8 F | HEIGHT: 63 IN

## 2021-04-15 DIAGNOSIS — Z00.00 MEDICARE ANNUAL WELLNESS VISIT, SUBSEQUENT: ICD-10-CM

## 2021-04-15 DIAGNOSIS — I10 ESSENTIAL HYPERTENSION: ICD-10-CM

## 2021-04-15 DIAGNOSIS — Z12.11 SCREENING FOR MALIGNANT NEOPLASM OF COLON: ICD-10-CM

## 2021-04-15 DIAGNOSIS — R63.4 UNINTENTIONAL WEIGHT LOSS: Primary | ICD-10-CM

## 2021-04-15 DIAGNOSIS — R73.01 IMPAIRED FASTING GLUCOSE: ICD-10-CM

## 2021-04-15 PROCEDURE — G8753 SYS BP > OR = 140: HCPCS | Performed by: FAMILY MEDICINE

## 2021-04-15 PROCEDURE — G8400 PT W/DXA NO RESULTS DOC: HCPCS | Performed by: FAMILY MEDICINE

## 2021-04-15 PROCEDURE — G0439 PPPS, SUBSEQ VISIT: HCPCS | Performed by: FAMILY MEDICINE

## 2021-04-15 PROCEDURE — 1101F PT FALLS ASSESS-DOCD LE1/YR: CPT | Performed by: FAMILY MEDICINE

## 2021-04-15 PROCEDURE — G8427 DOCREV CUR MEDS BY ELIG CLIN: HCPCS | Performed by: FAMILY MEDICINE

## 2021-04-15 PROCEDURE — G8754 DIAS BP LESS 90: HCPCS | Performed by: FAMILY MEDICINE

## 2021-04-15 PROCEDURE — G8536 NO DOC ELDER MAL SCRN: HCPCS | Performed by: FAMILY MEDICINE

## 2021-04-15 PROCEDURE — G9899 SCRN MAM PERF RSLTS DOC: HCPCS | Performed by: FAMILY MEDICINE

## 2021-04-15 PROCEDURE — 1090F PRES/ABSN URINE INCON ASSESS: CPT | Performed by: FAMILY MEDICINE

## 2021-04-15 PROCEDURE — 99214 OFFICE O/P EST MOD 30 MIN: CPT | Performed by: FAMILY MEDICINE

## 2021-04-15 PROCEDURE — G8420 CALC BMI NORM PARAMETERS: HCPCS | Performed by: FAMILY MEDICINE

## 2021-04-15 PROCEDURE — G8510 SCR DEP NEG, NO PLAN REQD: HCPCS | Performed by: FAMILY MEDICINE

## 2021-04-15 PROCEDURE — 3017F COLORECTAL CA SCREEN DOC REV: CPT | Performed by: FAMILY MEDICINE

## 2021-04-15 NOTE — PROGRESS NOTES
This is the Subsequent Medicare Annual Wellness Exam, performed 12 months or more after the Initial AWV or the last Subsequent AWV    I have reviewed the patient's medical history in detail and updated the computerized patient record. Assessment/Plan   Education and counseling provided:  Are appropriate based on today's review and evaluation  Pneumococcal Vaccine - pt declines  Influenza Vaccine - pt declines  Screening Mammography - due in July, normal 7/2020  Screening Pap and pelvic (covered once every 2 years) - pt unsure when she last had this done/if she had adequate screening. She will review her records at home and let us know if she needs to have this done. Colorectal cancer screening tests - FIT ordered  Bone mass measurement (DEXA) - Pt states she had this done at a hospital several years ago but is unclear on the details. 1. Medicare annual wellness visit, subsequent       Depression Risk Factor Screening     3 most recent PHQ Screens 4/15/2021   Little interest or pleasure in doing things Not at all   Feeling down, depressed, irritable, or hopeless Not at all   Total Score PHQ 2 0       Alcohol Risk Screen    Do you average more than 1 drink per night or more than 7 drinks a week:  No    On any one occasion in the past three months have you have had more than 3 drinks containing alcohol:  No        Functional Ability and Level of Safety    Hearing: Hearing is good. Activities of Daily Living: The home contains: handrails and grab bars  Patient does total self care      Ambulation: with no difficulty     Fall Risk:  Fall Risk Assessment, last 12 mths 4/15/2021   Able to walk? No   Fall in past 12 months? -   Do you feel unsteady? -   Are you worried about falling -      Abuse Screen:  Patient is not abused       Cognitive Screening    Has your family/caregiver stated any concerns about your memory: She has had some decreased memory since her seizures but it has been stable. Cognitive Screening: Declined    Health Maintenance Due     Health Maintenance Due   Topic Date Due    COVID-19 Vaccine (1) Never done    Medicare Yearly Exam  08/24/2019       Patient Care Team   Patient Care Team:  Sandy Worley MD as PCP - General (Family Medicine)  Sandy Worley MD as PCP - [facility] Provider  Colten Marquez, [address] (Optometry)  Hunter Nelson MD (Ophthalmology)    History     Patient Active Problem List   Diagnosis Code    Seizure disorder [facility]) G40.909    Essential hypertension I10    Primary open angle glaucoma H40.1190    Primary open angle glaucoma of both eyes, severe stage H40.1133    Age-related nuclear cataract of both eyes H25.13    Anxiety F41.9     Past Medical History:   Diagnosis Date    Essential hypertension 11/2/2016    Seizure ([address]     Seizures ([address]       History reviewed. No pertinent surgical history. Current Outpatient Medications   Medication Sig Dispense Refill    lisinopriL (PRINIVIL, ZESTRIL) 10 mg tablet Take 1 Tab by mouth daily. 30 Tab 3    hydrOXYzine HCL (ATARAX) 25 mg tablet Take 1 Tab by mouth three (3) times daily as needed for Anxiety. 30 Tab 3    atorvastatin (LIPITOR) 20 mg tablet Take 1 Tab by mouth daily. Will need labs checked after 3 months. 30 Tab 2    hydroCHLOROthiazide (HYDRODIURIL) 25 mg tablet Take 1 tablet by mouth once daily 30 Tab 5    latanoprost (XALATAN) 0.005 % ophthalmic solution INSTILL 1 DROP INTO EACH EYE AT BEDTIME  3    phenytoin ER (DILANTIN ER) 100 mg ER capsule TAKE 3 CAPSULES BY MOUTH AT BEDTIME 90 Cap 0    timolol (TIMOPTIC) 0.5 % ophthalmic solution 1 Drop two (2) times a day.  aspirin delayed-release 81 mg tablet Take 1 Tab by mouth daily.  100 Tab 3     No Known Allergies    Family History   Problem Relation Age of Onset    Stroke Father     Heart Disease Sister      Social History     Tobacco Use    Smoking status: Never Smoker    Smokeless tobacco: Never Used   Substance Use Topics    Alcohol use: Yes         Laura Craven MD

## 2021-04-15 NOTE — PROGRESS NOTES
1. Have you been to the ER, urgent care clinic since your last visit? Hospitalized since your last visit? No    2. Have you seen or consulted any other health care providers outside of the [address] [place] [address] since your last visit? Include any pap smears or colon screening. No    Reviewed record in preparation for visit and have necessary documentation  Goals that were addressed and/or need to be completed during or after this appointment include     Health Maintenance Due   Topic Date Due    COVID-19 Vaccine (1) Never done    Medicare Yearly Exam  08/24/2019       Patient is accompanied by self I have received verbal consent from Ekaterina Sullivan to discuss any/all medical information while they are present in the room.

## 2021-04-15 NOTE — PROGRESS NOTES
CC: Concern for weight loss    HPI: Pt is a 65 y.o. female who presents for concern for weight loss. She has lost 12lbs in the past 3 months, which is ~10% of her body weight. She reports that she has a good appetite and has not changed her diet or exercise regimen. She has known palpitations and is currently wearing a Holter monitor to work that up. She denies SOB, abdominal pain, N/V, diarrhea, blood in stool or new breast lumps. She has had some constipation. She has never smoked. She has never had a colonoscopy or other screening for colon cancer. Mammogram in 7/2020 was normal. She is unsure when her last pap was or if she had adequate screening prior to age 70. Her home BP's have been variable, from 120-160's/70-80's. They are normally in the 381-167'X systolic, however. Past Medical History:   Diagnosis Date    Essential hypertension 11/2/2016    [facility] [address]     Seizures ([facility] [address]        Family History   Problem Relation Age of Onset    Stroke Father     Heart Disease Sister        Social History     Tobacco Use    Smoking status: Never Smoker    Smokeless tobacco: Never Used   Substance Use Topics    Alcohol use: Yes    Drug use: No       ROS:  Per HPI    PE:  Visit Vitals  BP (!) 147/60 (BP 1 Location: Left upper arm, BP Patient Position: Sitting, BP Cuff Size: Adult)   Pulse 76   Temp 98.8 °F (37.1 °C) (Oral)   Resp 18   Ht 5' 3\" (1.6 m)   Wt 119 lb 6.4 oz (54.2 kg)   SpO2 98%   BMI 21.15 kg/m²     Gen: Pt sitting in chair, in NAD  Head: Normocephalic, atraumatic  Eyes: Sclera anicteric, EOM grossly intact, PERRL  Throat: Mask in place  Neck: Supple, no LAD, no thyromegaly or carotid bruits  CVS: Normal S1, S2, no m/r/g  Resp: CTAB, no wheezes or rales  Extrem: Atraumatic, no cyanosis or edema  Pulses: 2+   Skin: Warm, dry  Neuro: Alert, oriented, appropriate      A/P:   Encounter Diagnoses     ICD-10-CM ICD-9-CM   1. Unintentional weight loss  R63.4 783.21   2.  Medicare annual wellness visit, subsequent  Z00.00 V70.0   3. Screening for malignant neoplasm of colon  Z12.11 V76.51   4. Essential hypertension  I10 401.9   5. Impaired fasting glucose  R73.01 790.21     1. Medicare annual wellness visit, subsequent: See separate note from today    2. Unintentional weight loss: Will start work-up with labs and getting her up to date with recommended cancer screenings for her age. - CBC W/O DIFF; Future  - TSH 3RD GENERATION; Future  - METABOLIC PANEL, COMPREHENSIVE; Future  - HEMOGLOBIN A1C WITH EAG; Future  - SED RATE (ESR); Future  - C REACTIVE PROTEIN, QT; Future  - HIV 1/2 AG/AB, 4TH GENERATION,W RFLX CONFIRM; Future  - RPR; Future    3. Screening for malignant neoplasm of colon  - OCCULT BLOOD IMMUNOASSAY,DIAGNOSTIC; Future  - OCCULT BLOOD IMMUNOASSAY,DIAGNOSTIC    4. Essential hypertension: BP slightly elevated today and variable at home. Pt advised to call if BP was staying >140/90 or if she was no longer having values in the 120's. For now will continue her current regimen to prevent her from risk of hypotension.   - HEMOGLOBIN A1C WITH EAG; Future    5. Impaired fasting glucose: Seen on prior labs  - HEMOGLOBIN A1C WITH EAG; Future       RTC prn pending lab results      Discussed diagnoses in detail with patient. Medication risks/benefits/side effects discussed with patient. All of the patient's questions were addressed. The patient understands and agrees with our plan of care. The patient knows to call back if they are unsure of or forget any changes we discussed today or if the symptoms change. The patient received an After-Visit Summary which contains VS, orders, medication list and allergy list. This can be used as a \"mini-medical record\" should they have to seek medical care while out of town. Current Outpatient Medications on File Prior to Visit   Medication Sig Dispense Refill    lisinopriL (PRINIVIL, ZESTRIL) 10 mg tablet Take 1 Tab by mouth daily.  30 Tab 3    hydrOXYzine HCL (ATARAX) 25 mg tablet Take 1 Tab by mouth three (3) times daily as needed for Anxiety. 30 Tab 3    atorvastatin (LIPITOR) 20 mg tablet Take 1 Tab by mouth daily. Will need labs checked after 3 months. 30 Tab 2    hydroCHLOROthiazide (HYDRODIURIL) 25 mg tablet Take 1 tablet by mouth once daily 30 Tab 5    latanoprost (XALATAN) 0.005 % ophthalmic solution INSTILL 1 DROP INTO EACH EYE AT BEDTIME  3    phenytoin ER (DILANTIN ER) 100 mg ER capsule TAKE 3 CAPSULES BY MOUTH AT BEDTIME 90 Cap 0    timolol (TIMOPTIC) 0.5 % ophthalmic solution 1 Drop two (2) times a day.  aspirin delayed-release 81 mg tablet Take 1 Tab by mouth daily. 100 Tab 3     No current facility-administered medications on file prior to visit.

## 2021-04-15 NOTE — PATIENT INSTRUCTIONS
Medicare Wellness Visit, Female The best way to live healthy is to have a lifestyle where you eat a well-balanced diet, exercise regularly, limit alcohol use, and quit all forms of tobacco/nicotine, if applicable. Regular preventive services are another way to keep healthy. Preventive services (vaccines, screening tests, monitoring & exams) can help personalize your care plan, which helps you manage your own care. Screening tests can find health problems at the earliest stages, when they are easiest to treat. [facility] follows the current, evidence-based guidelines published by the [facility] ([facility]) when recommending preventive services for our patients. Because we follow these guidelines, sometimes recommendations change over time as research supports it. (For example, mammograms used to be recommended annually. Even though Medicare will still pay for an annual mammogram, the newer guidelines recommend a mammogram every two years for women of average risk). Of course, you and your doctor may decide to screen more often for some diseases, based on your risk and your co-morbidities (chronic disease you are already diagnosed with). Preventive services for you include: - Medicare offers their members a free annual wellness visit, which is time for you and your primary care provider to discuss and plan for your preventive service needs. Take advantage of this benefit every year! 
-All adults over the age of 72 should receive the recommended pneumonia vaccines. Current USPSTF guidelines recommend a series of two vaccines for the best pneumonia protection.  
-All adults should have a flu vaccine yearly and a tetanus vaccine every 10 years.  
-All adults age 48 and older should receive the shingles vaccines (series of two vaccines).      
-All adults age 38-68 who are overweight should have a diabetes screening test once every three years.  
-All adults born between [birth year] and 1967 should be screened once for Hepatitis C. 
-Other screening tests and preventive services for persons with diabetes include: an eye exam to screen for diabetic retinopathy, a kidney function test, a foot exam, and stricter control over your cholesterol.  
-Cardiovascular screening for adults with routine risk involves an electrocardiogram (ECG) at intervals determined by your doctor.  
-Colorectal cancer screenings should be done for adults age 54-65 with no increased risk factors for colorectal cancer. There are a number of acceptable methods of screening for this type of cancer. Each test has its own benefits and drawbacks. Discuss with your doctor what is most appropriate for you during your annual wellness visit. The different tests include: colonoscopy (considered the best screening method), a fecal occult blood test, a fecal DNA test, and sigmoidoscopy. 
 
-A bone mass density test is recommended when a woman turns 65 to screen for osteoporosis. This test is only recommended one time, as a screening. Some providers will use this same test as a disease monitoring tool if you already have osteoporosis. -Breast cancer screenings are recommended every other year for women of normal risk, age 54-69. 
-Cervical cancer screenings for women over age 72 are only recommended with certain risk factors. Here is a list of your current Health Maintenance items (your personalized list of preventive services) with a due date: 
Health Maintenance Due Topic Date Due  
 COVID-19 Vaccine (1) Never done [facility] Annual Well Visit  08/24/2019

## 2021-04-18 LAB
ALBUMIN SERPL-MCNC: 3.6 G/DL (ref 3.5–5)
ALBUMIN/GLOB SERPL: 1 {RATIO} (ref 1.1–2.2)
ALP SERPL-CCNC: 79 U/L (ref 45–117)
ALT SERPL-CCNC: 23 U/L (ref 12–78)
ANION GAP SERPL CALC-SCNC: 7 MMOL/L (ref 5–15)
AST SERPL-CCNC: 20 U/L (ref 15–37)
BILIRUB SERPL-MCNC: 0.2 MG/DL (ref 0.2–1)
BUN SERPL-MCNC: 20 MG/DL (ref 6–20)
BUN/CREAT SERPL: 25 (ref 12–20)
CALCIUM SERPL-MCNC: 9.5 MG/DL (ref 8.5–10.1)
CHLORIDE SERPL-SCNC: 104 MMOL/L (ref 97–108)
CO2 SERPL-SCNC: 29 MMOL/L (ref 21–32)
CREAT SERPL-MCNC: 0.79 MG/DL (ref 0.55–1.02)
CRP SERPL-MCNC: 0.45 MG/DL (ref 0–0.6)
ERYTHROCYTE [DISTWIDTH] IN BLOOD BY AUTOMATED COUNT: 15 % (ref 11.5–14.5)
ERYTHROCYTE [SEDIMENTATION RATE] IN BLOOD: 28 MM/HR (ref 0–30)
EST. AVERAGE GLUCOSE BLD GHB EST-MCNC: 126 MG/DL
GLOBULIN SER CALC-MCNC: 3.6 G/DL (ref 2–4)
GLUCOSE SERPL-MCNC: 101 MG/DL (ref 65–100)
HBA1C MFR BLD: 6 % (ref 4–5.6)
HCT VFR BLD AUTO: 33.5 % (ref 35–47)
HGB BLD-MCNC: 10.5 G/DL (ref 11.5–16)
HIV 1+2 AB+HIV1 P24 AG SERPL QL IA: NONREACTIVE
HIV12 RESULT COMMENT, HHIVC: NORMAL
MCH RBC QN AUTO: 29.5 PG (ref 26–34)
MCHC RBC AUTO-ENTMCNC: 31.3 G/DL (ref 30–36.5)
MCV RBC AUTO: 94.1 FL (ref 80–99)
NRBC # BLD: 0 K/UL (ref 0–0.01)
NRBC BLD-RTO: 0 PER 100 WBC
PLATELET # BLD AUTO: 257 K/UL (ref 150–400)
PMV BLD AUTO: 10.9 FL (ref 8.9–12.9)
POTASSIUM SERPL-SCNC: 4 MMOL/L (ref 3.5–5.1)
PROT SERPL-MCNC: 7.2 G/DL (ref 6.4–8.2)
RBC # BLD AUTO: 3.56 M/UL (ref 3.8–5.2)
RPR SER QL: NONREACTIVE
SODIUM SERPL-SCNC: 140 MMOL/L (ref 136–145)
TSH SERPL DL<=0.05 MIU/L-ACNC: 0.81 UIU/ML (ref 0.36–3.74)
WBC # BLD AUTO: 4.7 K/UL (ref 3.6–11)

## 2021-04-20 ENCOUNTER — TELEPHONE (OUTPATIENT)
Dept: FAMILY MEDICINE CLINIC | Age: 71
End: 2021-04-20

## 2021-04-20 NOTE — TELEPHONE ENCOUNTER
Called to advise pt of recent results. Everything was normal with her labs except that her blood sugar is in the pre-diabetic range and she is anemic. Recommend working on diet to decrease carbohydrates and we will keep an eye on the blood sugar for her. Regarding the anemia, would recommend we continue with the plan to check for blood in her stool. She completed the kit this morning and mailed it off. Will await results of that and go from there. She confirms that she is not having any vaginal bleeding or any other obvious blood loss. All questions answered and pt feels comfortable with the plan of care. Pt states her daughter, Javier Coburn, may call up to go over these results too, and we are fine to talk with her.

## 2021-04-22 ENCOUNTER — TELEPHONE (OUTPATIENT)
Dept: FAMILY MEDICINE CLINIC | Age: 71
End: 2021-04-22

## 2021-04-22 NOTE — TELEPHONE ENCOUNTER
Pt is wondering about the heart monitor. Pt states she is feeling bad. Asking for a call back please. Call the cell number second. She is very anxious.

## 2021-04-22 NOTE — TELEPHONE ENCOUNTER
Spoke with pt   She is feeling \"tired\", \"weak\"  poor appetite  \"Heaviness in chest\"  \" every now and then\"  She is wearing heart monitor until tomorrow   Instructed pt to send monitor back if needs help to return to office for assistance

## 2021-04-22 NOTE — TELEPHONE ENCOUNTER
Pt called back on the below message. She thought she had missed the call. Please call home first then the cell. Thank you.

## 2021-04-23 ENCOUNTER — TELEPHONE (OUTPATIENT)
Dept: FAMILY MEDICINE CLINIC | Age: 71
End: 2021-04-23

## 2021-04-23 NOTE — TELEPHONE ENCOUNTER
Spoke with pt she will send monitor back today  If she needs further assistance she will call back  She ifs feeling \"better\" today

## 2021-04-26 NOTE — TELEPHONE ENCOUNTER
Returned pt's call. She is inquiring about the results of her Holter monitor and stool test. Unfortunately neither are back yet. Advised I would call her as soon as I get the results. She is wondering about seeing a GI doctor for her chest/epigastric pain. Advised that this would be a good idea if the Holter monitor comes back negative. She will think about it and call us if she decides she wants the referral. All questions answered and pt feels comfortable with the plan of care.

## 2021-04-30 ENCOUNTER — TELEPHONE (OUTPATIENT)
Dept: FAMILY MEDICINE CLINIC | Age: 71
End: 2021-04-30

## 2021-04-30 NOTE — TELEPHONE ENCOUNTER
Attempted to call patient about holter monitor results at home phone and cell phone but no answer, left a voicemail. Holter monitor showed 4 stable events with PVCs, no afib noted, no concerning rhythm noted. Will scan results into patient's chart.      Virgilio Dunne MD

## 2021-04-30 NOTE — TELEPHONE ENCOUNTER
Let pt know about her results about her holter results.  Pt would like to know if you had received her results form her stool sample

## 2021-05-10 ENCOUNTER — TELEPHONE (OUTPATIENT)
Dept: FAMILY MEDICINE CLINIC | Age: 71
End: 2021-05-10

## 2021-05-10 NOTE — TELEPHONE ENCOUNTER
Pt states she has a black spot on the bottom of her heal on her left foot. It is starting to get sore.

## 2021-05-10 NOTE — TELEPHONE ENCOUNTER
Attempted to call. No answer. Message left. If patient returns call, an appt needs to be scheduled for this problem.

## 2021-05-20 ENCOUNTER — OFFICE VISIT (OUTPATIENT)
Dept: FAMILY MEDICINE CLINIC | Age: 71
End: 2021-05-20
Payer: MEDICARE

## 2021-05-20 VITALS
RESPIRATION RATE: 16 BRPM | TEMPERATURE: 98.7 F | DIASTOLIC BLOOD PRESSURE: 76 MMHG | WEIGHT: 121 LBS | HEART RATE: 70 BPM | HEIGHT: 63 IN | BODY MASS INDEX: 21.44 KG/M2 | SYSTOLIC BLOOD PRESSURE: 175 MMHG | OXYGEN SATURATION: 100 %

## 2021-05-20 DIAGNOSIS — L84 PRE-ULCERATIVE CORN OR CALLOUS: ICD-10-CM

## 2021-05-20 DIAGNOSIS — I49.3 PVC'S (PREMATURE VENTRICULAR CONTRACTIONS): ICD-10-CM

## 2021-05-20 DIAGNOSIS — R00.2 PALPITATIONS: ICD-10-CM

## 2021-05-20 DIAGNOSIS — I10 ESSENTIAL HYPERTENSION: Primary | ICD-10-CM

## 2021-05-20 PROCEDURE — G8536 NO DOC ELDER MAL SCRN: HCPCS | Performed by: FAMILY MEDICINE

## 2021-05-20 PROCEDURE — 1090F PRES/ABSN URINE INCON ASSESS: CPT | Performed by: FAMILY MEDICINE

## 2021-05-20 PROCEDURE — G8400 PT W/DXA NO RESULTS DOC: HCPCS | Performed by: FAMILY MEDICINE

## 2021-05-20 PROCEDURE — 1101F PT FALLS ASSESS-DOCD LE1/YR: CPT | Performed by: FAMILY MEDICINE

## 2021-05-20 PROCEDURE — 3017F COLORECTAL CA SCREEN DOC REV: CPT | Performed by: FAMILY MEDICINE

## 2021-05-20 PROCEDURE — 99214 OFFICE O/P EST MOD 30 MIN: CPT | Performed by: FAMILY MEDICINE

## 2021-05-20 PROCEDURE — G8427 DOCREV CUR MEDS BY ELIG CLIN: HCPCS | Performed by: FAMILY MEDICINE

## 2021-05-20 PROCEDURE — G9899 SCRN MAM PERF RSLTS DOC: HCPCS | Performed by: FAMILY MEDICINE

## 2021-05-20 PROCEDURE — G8754 DIAS BP LESS 90: HCPCS | Performed by: FAMILY MEDICINE

## 2021-05-20 PROCEDURE — G8753 SYS BP > OR = 140: HCPCS | Performed by: FAMILY MEDICINE

## 2021-05-20 PROCEDURE — G8420 CALC BMI NORM PARAMETERS: HCPCS | Performed by: FAMILY MEDICINE

## 2021-05-20 PROCEDURE — G8510 SCR DEP NEG, NO PLAN REQD: HCPCS | Performed by: FAMILY MEDICINE

## 2021-05-20 NOTE — PROGRESS NOTES
CC: Spot on foot    HPI: Pt is a 65 y.o. female who presents for spot on foot. She has had a dark spot on her left heel for the past year. She does not remember any injuries to the area. The spot feels hard and sometimes a little tender. She has tried some alcohol wipes which help soften it a little. Her BP is elevated today. She reports that it has been up and down at home, sometimes as low as 555 systolic. She is hesitant to change her medication routine because of this. She is inquiring about the results of her Holter monitor. Chart review shows that she had PVC's but no other concerning findings. She reports that she still has occasional episodes of palpitations when she lays down at night. Past Medical History:   Diagnosis Date    Essential hypertension 11/2/2016    [facility]izure ([facility] [address]     Seizures ([facility] [address]        Family History   Problem Relation Age of Onset    Stroke Father     Heart Disease Sister        Social History     Tobacco Use    Smoking status: Never Smoker    Smokeless tobacco: Never Used   Substance Use Topics    Alcohol use: Yes    Drug use: No       ROS:  Per HPI    PE:  Visit Vitals  BP (!) 175/76   Pulse 70   Temp 98.7 °F (37.1 °C) (Oral)   Resp 16   Ht 5' 3\" (1.6 m)   Wt 121 lb (54.9 kg)   SpO2 100%   BMI 21.43 kg/m²     Gen: Pt sitting in chair, in NAD  Head: Normocephalic, atraumatic  Eyes: Sclera anicteric, EOM grossly intact, PERRL  Throat: Mask in place  Neck: Supple, no LAD, no thyromegaly or carotid bruits  CVS: Normal S1, S2, no m/r/g. Regular rhythm. Resp: CTAB, no wheezes or rales  Extrem: Atraumatic, no cyanosis or edema. 1.5cm hyperpigmented callus on left posterior heel. Pulses: 2+   Skin: Warm, dry  Neuro: Alert, oriented, appropriate      A/P:   Encounter Diagnoses     ICD-10-CM ICD-9-CM   1. Essential hypertension  I10 401.9   2. Pre-ulcerative corn or callous  L84 700   3. Palpitations  R00.2 785.1   4.  PVC's (premature ventricular contractions)  I49.3 427.69     1. Essential hypertension: BP slightly improved on recheck but still above goal. Pt advised to continue monitoring at home, bring in logs, and call if it is staying up. New log provided to her today. - Continue lisinopril 10mg daily and HCTZ 25mg daily    2. Pre-ulcerative corn or callous: Discussed treatment with soaking feet and gentle exfoliation with pumice stone. 3. Palpitations: Likely 2/2 PVC's. Discussed that if these are bothersome to her we could try metoprolol (would also have benefit for BP). She will think about it. 4. PVC's (premature ventricular contractions)       RTC in 5 months for f/u chronic conditions, or sooner prn    Discussed diagnoses in detail with patient. Medication risks/benefits/side effects discussed with patient. All of the patient's questions were addressed. The patient understands and agrees with our plan of care. The patient knows to call back if they are unsure of or forget any changes we discussed today or if the symptoms change. The patient received an After-Visit Summary which contains VS, orders, medication list and allergy list. This can be used as a \"mini-medical record\" should they have to seek medical care while out of town. Current Outpatient Medications on File Prior to Visit   Medication Sig Dispense Refill    lisinopriL (PRINIVIL, ZESTRIL) 10 mg tablet Take 1 Tab by mouth daily. 30 Tab 3    hydrOXYzine HCL (ATARAX) 25 mg tablet Take 1 Tab by mouth three (3) times daily as needed for Anxiety. 30 Tab 3    atorvastatin (LIPITOR) 20 mg tablet Take 1 Tab by mouth daily. Will need labs checked after 3 months.  30 Tab 2    hydroCHLOROthiazide (HYDRODIURIL) 25 mg tablet Take 1 tablet by mouth once daily 30 Tab 5    latanoprost (XALATAN) 0.005 % ophthalmic solution INSTILL 1 DROP INTO EACH EYE AT BEDTIME  3    phenytoin ER (DILANTIN ER) 100 mg ER capsule TAKE 3 CAPSULES BY MOUTH AT BEDTIME 90 Cap 0    timolol (TIMOPTIC) 0.5 % ophthalmic solution 1 Drop two (2) times a day.  aspirin delayed-release 81 mg tablet Take 1 Tab by mouth daily. 100 Tab 3     No current facility-administered medications on file prior to visit.

## 2021-05-20 NOTE — PROGRESS NOTES
1. Have you been to the ER, urgent care clinic since your last visit? Hospitalized since your last visit? No    2. Have you seen or consulted any other health care providers outside of the [address] [place] [address] since your last visit? Include any pap smears or colon screening.  No  Reviewed record in preparation for visit and have necessary documentation  Pt did not bring medication to office visit for review    Goals that were addressed and/or need to be completed during or after this appointment include   Health Maintenance Due   Topic Date Due    COVID-19 Vaccine (1) Never done    Shingrix Vaccine Age 50> (1 of 2) Never done

## 2021-05-20 NOTE — PATIENT INSTRUCTIONS
Try a pumice stone for your callous. Try some Ensure or Boost as a morning or afternoon snack. Your heart monitor showed only PVC's (premature ventricular contractions). These are extra beats of the heart and are not dangerous but can make you feel like you heart is fluttering or skipping a beat. Normally we don't treat these but if they are very bothersome to you, we could try a medication called metoprolol. [address] Affiliated with [address] [place] [address] [address] [place], [address] [place] [address] 
[phone] Monitor blood pressure outside the office several times weekly at different times during the day and evening. Bring the record to me in 3 weeks for review. Blood Pressure Record Patient Name:  ______________________ :  ______________________ Date/Time BP Reading Pulse Home Blood Pressure Test: About This Test 
What is it? A home blood pressure test allows you to keep track of your blood pressure at home. Blood pressure is a measure of the force of blood against the walls of your arteries. Blood pressure readings include two numbers, such as 130/80 (say \"130 over 80\"). The first number is the systolic pressure. The second number is the diastolic pressure. Why is this test done? You may do this test at home to: · Find out if you have high blood pressure. · Track your blood pressure if you have high blood pressure. · Track how well medicine is working to reduce high blood pressure. · Check how lifestyle changes, such as weight loss and exercise, are affecting blood pressure. How do you prepare for the test? 
For at least 30 minutes before you take your blood pressure, don't exercise, drink caffeine, or smoke.  Empty your bladder before the test. Sit quietly with your back straight and both feet on the floor for at least 5 minutes. This helps you take your blood pressure while you feel comfortable and relaxed. How is the test done? · If your doctor recommends it, take your blood pressure twice a day. Take it in the morning and evening. · Sit with your arm slightly bent and resting on a table so that your upper arm is at the same level as your heart. · Use the same arm each time you take your blood pressure. · Place the blood pressure cuff on the bare skin of your upper arm. You may have to roll up your sleeve, remove your arm from the sleeve, or take your shirt off. · Wrap the blood pressure cuff around your upper arm so that the lower edge of the cuff is about 1 inch above the bend of your elbow. · Do not move, talk, or text while you take your blood pressure. Follow the instructions that came with your blood pressure monitor. They might be different from the following. · Press the on/off button on the automatic monitor. Then you may need to wait until the screen says the monitor is ready. · Press the start button. The cuff will inflate and deflate by itself. · Your blood pressure numbers will appear on the screen. · Wait one minute and take your blood pressure again. · If your monitor does not automatically save your numbers, write them in your log book, along with the date and time. Follow-up care is a key part of your treatment and safety. Be sure to make and go to all appointments, and call your doctor if you are having problems. It's also a good idea to keep a list of the medicines you take. Where can you learn more? Go to http://www.[employer].com/ Enter C427 in the search box to learn more about \"Home Blood Pressure Test: About This Test.\" Current as of: August 31, 2020               Content Version: 12.8 © 5048-1003 [employer].   
Care instructions adapted under license by [employer] (which disclaims liability or warranty for this information). If you have questions about a medical condition or this instruction, always ask your healthcare professional. [address] any warranty or liability for your use of this information. Corns and Calluses: Care Instructions Your Care Instructions Corns and calluses are areas of thick, hard, dead skin. They form to protect your skin from injury. Corns usually form where toes rub together. Calluses often form on the hands or feet. They may form wherever the skin rubs against something, such as shoes. In most cases, you can take steps at home to care for a corn or callus. Follow-up care is a key part of your treatment and safety. Be sure to make and go to all appointments, and call your doctor if you are having problems. It's also a good idea to know your test results and keep a list of the medicines you take. How can you care for yourself at home? · Wear shoes and other footwear that fit correctly and are roomy. This will reduce rubbing and give corns or calluses time to heal. 
· Use protective pads, such as moleskin, to cushion the callus or corn. · Soften the corn or callus and remove the dead skin by using over-the-counter products such as salicylic acid. If you have a condition that causes problems with blood flow (such as peripheral vascular disease) or loss of feeling in your feet (such as diabetes), talk to your doctor before you try any home treatment. · Soak your corn or callus in warm water, and then use a pumice stone to rub dead skin away. · Wash your feet regularly, and rub lotion into your feet while they are still moist. Dry skin can cause a callus to crack and bleed. · Never cut the corn or callus yourself, especially if you have problems with blood flow to your legs or feet. When should you call for help? Call your doctor now or seek immediate medical care if: 
  · You have signs of infection, such as: 
? Increased pain, swelling, warmth, or redness around the corn or callus. ? Red streaks leading from the corn or callus. ? Pus draining from the corn or callus. ? A fever. Watch closely for changes in your health, and be sure to contact your doctor if: 
  · You do not get better as expected. Where can you learn more? Go to http://www.[employer].com/ Enter R244 in the search box to learn more about \"Corns and Calluses: Care Instructions. \" Current as of: July 2, 2020               Content Version: 12.8 © 6724-5157 [employer]. Care instructions adapted under license by [employer] (which disclaims liability or warranty for this information). If you have questions about a medical condition or this instruction, always ask your healthcare professional. [address] any warranty or liability for your use of this information.

## 2021-06-03 ENCOUNTER — TELEPHONE (OUTPATIENT)
Dept: FAMILY MEDICINE CLINIC | Age: 71
End: 2021-06-03

## 2021-06-03 NOTE — TELEPHONE ENCOUNTER
----- Message from Davidson Sep sent at 6/3/2021  8:37 AM EDT -----  Regarding: Dr. Ellsworth More / telephone  General Message/Vendor Calls    Caller's first and last name:Ruchi Rowe      Reason for call: pt needs to speak w/her regarding weight loss, and she's been . Callback required yes/no and why: yes to discuss      Best contact number(s): [phone] -h or [phone] - c      Details to clarify the request: pt advises she has spoken w/Dr. Ellsworth More about this topic last time she was in and is concerned. Try home # first leave a message and try cell number.        Cedeño Sep

## 2021-07-12 ENCOUNTER — OFFICE VISIT (OUTPATIENT)
Dept: FAMILY MEDICINE CLINIC | Age: 71
End: 2021-07-12
Payer: MEDICARE

## 2021-07-12 VITALS
BODY MASS INDEX: 21.44 KG/M2 | SYSTOLIC BLOOD PRESSURE: 144 MMHG | TEMPERATURE: 98.4 F | HEIGHT: 63 IN | DIASTOLIC BLOOD PRESSURE: 55 MMHG | WEIGHT: 121 LBS | RESPIRATION RATE: 18 BRPM | OXYGEN SATURATION: 100 % | HEART RATE: 73 BPM

## 2021-07-12 DIAGNOSIS — I10 ESSENTIAL HYPERTENSION: ICD-10-CM

## 2021-07-12 DIAGNOSIS — R63.4 WEIGHT LOSS: Primary | ICD-10-CM

## 2021-07-12 PROCEDURE — 1090F PRES/ABSN URINE INCON ASSESS: CPT | Performed by: FAMILY MEDICINE

## 2021-07-12 PROCEDURE — G8753 SYS BP > OR = 140: HCPCS | Performed by: FAMILY MEDICINE

## 2021-07-12 PROCEDURE — G8510 SCR DEP NEG, NO PLAN REQD: HCPCS | Performed by: FAMILY MEDICINE

## 2021-07-12 PROCEDURE — G8420 CALC BMI NORM PARAMETERS: HCPCS | Performed by: FAMILY MEDICINE

## 2021-07-12 PROCEDURE — 3017F COLORECTAL CA SCREEN DOC REV: CPT | Performed by: FAMILY MEDICINE

## 2021-07-12 PROCEDURE — 1101F PT FALLS ASSESS-DOCD LE1/YR: CPT | Performed by: FAMILY MEDICINE

## 2021-07-12 PROCEDURE — G8754 DIAS BP LESS 90: HCPCS | Performed by: FAMILY MEDICINE

## 2021-07-12 PROCEDURE — G9899 SCRN MAM PERF RSLTS DOC: HCPCS | Performed by: FAMILY MEDICINE

## 2021-07-12 PROCEDURE — G8427 DOCREV CUR MEDS BY ELIG CLIN: HCPCS | Performed by: FAMILY MEDICINE

## 2021-07-12 PROCEDURE — 99213 OFFICE O/P EST LOW 20 MIN: CPT | Performed by: FAMILY MEDICINE

## 2021-07-12 PROCEDURE — G8400 PT W/DXA NO RESULTS DOC: HCPCS | Performed by: FAMILY MEDICINE

## 2021-07-12 PROCEDURE — G8536 NO DOC ELDER MAL SCRN: HCPCS | Performed by: FAMILY MEDICINE

## 2021-07-12 NOTE — PROGRESS NOTES
CC: f/u weight loss    HPI: Pt is a 66 y.o. female who presents for f/u weight loss and HTN. Since her last visit her weight has been stable but she still feels like it is lower than it should be. She lost 12lbs earlier this year and has not been able to gain it back. She denies SOB, CP, palpitations, N/V/D, blood in stool and breast lumps. She had a normal mammogram in 7/2020 and is going to have another one this month. She cannot remember her last pap but is not interested in repeating that at this time. She did the stool test for colon cancer screening but it never resulted and is unclear what happened there. She had basic labs done for weight loss in 4/2021 that were unrevealing. She started drinking Boost and Ensure last week and likes those. She also reports that recently her appetite seems to be coming back and she is eating more. She reports her BP at home has been ranging 217-809'D systolic for the most part and sometimes goes into the 140's. Past Medical History:   Diagnosis Date    Essential hypertension 11/2/2016    [facility] [address] [facility]     Seizures ([facility] [address] [facility]        Family History   Problem Relation Age of Onset    Stroke Father     Heart Disease Sister        Social History     Tobacco Use    Smoking status: Never Smoker    Smokeless tobacco: Never Used   Substance Use Topics    Alcohol use: Yes    Drug use: No       ROS:  Per HPI    PE:  Visit Vitals  BP (!) 144/55   Pulse 73   Temp 98.4 °F (36.9 °C) (Oral)   Resp 18   Ht 5' 3\" (1.6 m)   Wt 121 lb (54.9 kg)   SpO2 100%   BMI 21.43 kg/m²     Gen: Pt sitting in chair, in NAD  Head: Normocephalic, atraumatic  Eyes: Sclera anicteric, EOM grossly intact, PERRL  Throat: Mask in place  Neck: Supple  CVS: Normal S1, S2, no m/r/g  Resp: CTAB, no wheezes or rales  Extrem: Atraumatic, no cyanosis or edema  Pulses: 2+   Skin: Warm, dry  Neuro: Alert, oriented, appropriate      A/P:   Encounter Diagnoses     ICD-10-CM ICD-9-CM   1.  Weight loss  R63.4 783.21 2. Essential hypertension  I10 401.9     1. Weight loss: Discussed patient concerns. She is more focused on how to gain some weight back than concerned about the weight loss. We have discussed work-up for potential neoplasms on several occasions. Other than the weight loss she is asymptomatic and she is not interested in doing another test for colon cancer or a pap smear at this time. Her weight has now been stable for the past 2 months. Discussed options. She would like to see how she does with drinking a Boost or Ensure supplement 1-2 times a day for the next few months and see if she can get her weight to around 130 which was her previous baseline. Advised that she is not at an unhealthy weight now, but it is fine for her to have a goal of 130lbs which would put her BMI at 23. Advised to come back sooner if she starts to lose weight again, or if she decides to pursue any of the cancer screening tests. 2. Essential hypertension: Stable. Pt hesitant to make changes to regimen as home numbers are better than in clinic. Will continue to monitor. RTC in 3 months for f/u chronic conditions, or sooner prn      Discussed diagnoses in detail with patient. Medication risks/benefits/side effects discussed with patient. All of the patient's questions were addressed. The patient understands and agrees with our plan of care. The patient knows to call back if they are unsure of or forget any changes we discussed today or if the symptoms change. The patient received an After-Visit Summary which contains VS, orders, medication list and allergy list. This can be used as a \"mini-medical record\" should they have to seek medical care while out of town. Current Outpatient Medications on File Prior to Visit   Medication Sig Dispense Refill    atorvastatin (LIPITOR) 20 mg tablet Take 1 Tablet by mouth daily. 30 Tablet 5    lisinopriL (PRINIVIL, ZESTRIL) 10 mg tablet Take 1 Tab by mouth daily.  30 Tab 3    hydrOXYzine HCL (ATARAX) 25 mg tablet Take 1 Tab by mouth three (3) times daily as needed for Anxiety. 30 Tab 3    hydroCHLOROthiazide (HYDRODIURIL) 25 mg tablet Take 1 tablet by mouth once daily 30 Tab 5    latanoprost (XALATAN) 0.005 % ophthalmic solution INSTILL 1 DROP INTO EACH EYE AT BEDTIME  3    phenytoin ER (DILANTIN ER) 100 mg ER capsule TAKE 3 CAPSULES BY MOUTH AT BEDTIME 90 Cap 0    timolol (TIMOPTIC) 0.5 % ophthalmic solution 1 Drop two (2) times a day.  aspirin delayed-release 81 mg tablet Take 1 Tab by mouth daily. 100 Tab 3     No current facility-administered medications on file prior to visit.

## 2021-07-12 NOTE — PROGRESS NOTES
1. Have you been to the ER, urgent care clinic since your last visit? Hospitalized since your last visit? No    2. Have you seen or consulted any other health care providers outside of the [address] [place] [address] since your last visit? Include any pap smears or colon screening. No    Reviewed record in preparation for visit and have necessary documentation  Goals that were addressed and/or need to be completed during or after this appointment include     Health Maintenance Due   Topic Date Due    COVID-19 Vaccine (1) Never done    DTaP/Tdap/Td series (1 - Tdap) Never done    Shingrix Vaccine Age 50> (1 of 2) Never done    Pneumococcal 65+ years (1 of 1 - PPSV23) Never done       Patient is accompanied by self I have received verbal consent from Ann Cervantes to discuss any/all medical information while they are present in the room.

## 2021-07-14 ENCOUNTER — TRANSCRIBE ORDER (OUTPATIENT)
Dept: SCHEDULING | Age: 71
End: 2021-07-14

## 2021-07-14 DIAGNOSIS — Z12.31 SCREENING MAMMOGRAM FOR HIGH-RISK PATIENT: Primary | ICD-10-CM

## 2021-07-16 ENCOUNTER — HOSPITAL ENCOUNTER (OUTPATIENT)
Dept: MAMMOGRAPHY | Age: 71
Discharge: HOME | End: 2021-07-16
Attending: FAMILY MEDICINE
Payer: MEDICARE

## 2021-07-16 DIAGNOSIS — Z12.31 SCREENING MAMMOGRAM FOR HIGH-RISK PATIENT: ICD-10-CM

## 2021-07-16 PROCEDURE — 77067 SCR MAMMO BI INCL CAD: CPT

## 2021-10-12 DIAGNOSIS — I10 ESSENTIAL HYPERTENSION: ICD-10-CM

## 2021-10-13 RX ORDER — LISINOPRIL 10 MG/1
10 TABLET ORAL DAILY
Qty: 30 TABLET | Refills: 3 | Status: SHIPPED | OUTPATIENT
Start: 2021-10-13 | End: 2022-02-10

## 2021-12-09 ENCOUNTER — OFFICE VISIT (OUTPATIENT)
Dept: FAMILY MEDICINE CLINIC | Age: 71
End: 2021-12-09
Payer: MEDICARE

## 2021-12-09 VITALS
TEMPERATURE: 98.7 F | RESPIRATION RATE: 16 BRPM | OXYGEN SATURATION: 100 % | WEIGHT: 127 LBS | SYSTOLIC BLOOD PRESSURE: 144 MMHG | DIASTOLIC BLOOD PRESSURE: 68 MMHG | BODY MASS INDEX: 22.5 KG/M2 | HEART RATE: 67 BPM | HEIGHT: 63 IN

## 2021-12-09 DIAGNOSIS — E78.2 MIXED HYPERLIPIDEMIA: ICD-10-CM

## 2021-12-09 DIAGNOSIS — R07.9 CHEST PAIN, UNSPECIFIED TYPE: Primary | ICD-10-CM

## 2021-12-09 DIAGNOSIS — I10 ESSENTIAL HYPERTENSION: ICD-10-CM

## 2021-12-09 PROCEDURE — G8536 NO DOC ELDER MAL SCRN: HCPCS | Performed by: FAMILY MEDICINE

## 2021-12-09 PROCEDURE — 3017F COLORECTAL CA SCREEN DOC REV: CPT | Performed by: FAMILY MEDICINE

## 2021-12-09 PROCEDURE — 99215 OFFICE O/P EST HI 40 MIN: CPT | Performed by: FAMILY MEDICINE

## 2021-12-09 PROCEDURE — G8400 PT W/DXA NO RESULTS DOC: HCPCS | Performed by: FAMILY MEDICINE

## 2021-12-09 PROCEDURE — 1101F PT FALLS ASSESS-DOCD LE1/YR: CPT | Performed by: FAMILY MEDICINE

## 2021-12-09 PROCEDURE — 93000 ELECTROCARDIOGRAM COMPLETE: CPT | Performed by: FAMILY MEDICINE

## 2021-12-09 PROCEDURE — G8754 DIAS BP LESS 90: HCPCS | Performed by: FAMILY MEDICINE

## 2021-12-09 PROCEDURE — G9899 SCRN MAM PERF RSLTS DOC: HCPCS | Performed by: FAMILY MEDICINE

## 2021-12-09 PROCEDURE — 1090F PRES/ABSN URINE INCON ASSESS: CPT | Performed by: FAMILY MEDICINE

## 2021-12-09 PROCEDURE — G8427 DOCREV CUR MEDS BY ELIG CLIN: HCPCS | Performed by: FAMILY MEDICINE

## 2021-12-09 PROCEDURE — G8510 SCR DEP NEG, NO PLAN REQD: HCPCS | Performed by: FAMILY MEDICINE

## 2021-12-09 PROCEDURE — G8753 SYS BP > OR = 140: HCPCS | Performed by: FAMILY MEDICINE

## 2021-12-09 PROCEDURE — G8420 CALC BMI NORM PARAMETERS: HCPCS | Performed by: FAMILY MEDICINE

## 2021-12-09 NOTE — PROGRESS NOTES
CC: Chest tightness and elevated BP    HPI: Pt is a 66 y.o. female who presents for elevated BP and chest tightness. She also reports that she has been having a discomfort/tightness between her shoulder blades that radiates to the left chest for the past few days. Worse when she rotates her upper body and when she moves her arms a certain way. Pain is improved with aspirin, OTC muscle rub and with massage. She does not remember doing anything out of the ordinary prior to symptoms starting. Pt states that she has had a similar pain in the past that it normally goes away on its own. Currently it is feeling more dull than before, but is still present. Denies diaphoresis, N/V and SOB. Of note, she has a h/o labile HTN and reports that prior to her visit she checked her BP at home and it was in the 215'M systolic, then went up to the 414'C systolic on recheck several minutes later. Chart review shows she had a stress test in 1/2019 that was wnl. Past Medical History:   Diagnosis Date    Essential hypertension 11/2/2016    HLD (hyperlipidemia)     [facility] ([facility] [address]     Seizures ([facility] [address]        Family History   Problem Relation Age of Onset    Stroke Father     Heart Disease Sister        Social History     Tobacco Use    Smoking status: Never Smoker    Smokeless tobacco: Never Used   Substance Use Topics    Alcohol use: Yes    Drug use: No       ROS:  Per HPI    PE:  Visit Vitals  BP (!) 144/68   Pulse 67   Temp 98.7 °F (37.1 °C)   Resp 16   Ht 5' 3\" (1.6 m)   Wt 127 lb (57.6 kg)   SpO2 100%   BMI 22.50 kg/m²     Gen: Pt sitting in chair, in NAD  Head: Normocephalic, atraumatic  Eyes: Sclera anicteric, EOM grossly intact, PERRL  Nose: Normal nasal mucosa  Throat: MMM, normal lips, tongue and gums  Neck: Supple, no LAD, no thyromegaly or carotid bruits  CVS: Normal S1, S2, no m/r/g  Resp: CTAB, no wheezes or rales  Chest wall: No TTP of chest wall  MSK: No TTP of thoracic vertebrae or paraspinal muscles. Extrem: Atraumatic, no cyanosis or edema  Pulses: 2+   Skin: Warm, dry  Neuro: Alert, oriented, appropriate      A/P:   Encounter Diagnoses     ICD-10-CM ICD-9-CM   1. Chest pain, unspecified type  R07.9 786.50   2. Essential hypertension  I10 401.9   3. Mixed hyperlipidemia  E78.2 272.2     1. Chest pain, unspecified type: EKG obtained today and similar to previous EKG's on file, however her description of the pain is concerning for possible cardiac process. Discussed DDx with pt and  and recommended that to fully r/o more serious causes, she proceed to the ER for further evaluation. They are in agreement with this. - AMB POC EKG ROUTINE W/ 12 LEADS, INTER & REP    2. Essential hypertension: BP improved on recheck to 144/68    3. Mixed hyperlipidemia: Continue statin       RTC TBD pending outcome of ER visit    Discussed diagnoses in detail with patient. Medication risks/benefits/side effects discussed with patient. All of the patient's questions were addressed. The patient understands and agrees with our plan of care. The patient knows to call back if they are unsure of or forget any changes we discussed today or if the symptoms change. The patient received an After-Visit Summary which contains VS, orders, medication list and allergy list. This can be used as a \"mini-medical record\" should they have to seek medical care while out of town. Current Outpatient Medications on File Prior to Visit   Medication Sig Dispense Refill    lisinopriL (PRINIVIL, ZESTRIL) 10 mg tablet Take 1 Tablet by mouth daily. 30 Tablet 3    hydroCHLOROthiazide (HYDRODIURIL) 25 mg tablet Take 1 tablet by mouth once daily 30 Tablet 5    atorvastatin (LIPITOR) 20 mg tablet Take 1 Tablet by mouth daily. 30 Tablet 5    hydrOXYzine HCL (ATARAX) 25 mg tablet Take 1 Tab by mouth three (3) times daily as needed for Anxiety.  30 Tab 3    latanoprost (XALATAN) 0.005 % ophthalmic solution INSTILL 1 DROP INTO EACH EYE AT BEDTIME 3    phenytoin ER (DILANTIN ER) 100 mg ER capsule TAKE 3 CAPSULES BY MOUTH AT BEDTIME 90 Cap 0    timolol (TIMOPTIC) 0.5 % ophthalmic solution 1 Drop two (2) times a day.  aspirin delayed-release 81 mg tablet Take 1 Tab by mouth daily. 100 Tab 3     No current facility-administered medications on file prior to visit.

## 2021-12-09 NOTE — PROGRESS NOTES
1. Have you been to the ER, urgent care clinic since your last visit? Hospitalized since your last visit? No    2. Have you seen or consulted any other health care providers outside of the [address] [place] [address] since your last visit? Include any pap smears or colon screening.  No  Reviewed record in preparation for visit and have necessary documentation  Pt did not bring medication to office visit for review    Goals that were addressed and/or need to be completed during or after this appointment include   Health Maintenance Due   Topic Date Due    COVID-19 Vaccine (2 - Booster for [employer] series) 07/30/2021

## 2022-03-18 PROBLEM — F41.9 ANXIETY: Status: ACTIVE | Noted: 2021-04-08

## 2022-03-19 PROBLEM — H40.1133 PRIMARY OPEN ANGLE GLAUCOMA OF BOTH EYES, SEVERE STAGE: Status: ACTIVE | Noted: 2017-02-06

## 2022-04-27 ENCOUNTER — OFFICE VISIT (OUTPATIENT)
Dept: FAMILY MEDICINE CLINIC | Age: 72
End: 2022-04-27
Payer: MEDICARE

## 2022-04-27 VITALS
TEMPERATURE: 98.4 F | SYSTOLIC BLOOD PRESSURE: 145 MMHG | OXYGEN SATURATION: 100 % | HEIGHT: 63 IN | HEART RATE: 72 BPM | DIASTOLIC BLOOD PRESSURE: 73 MMHG | BODY MASS INDEX: 22.68 KG/M2 | WEIGHT: 128 LBS | RESPIRATION RATE: 16 BRPM

## 2022-04-27 DIAGNOSIS — R21 RASH: Primary | ICD-10-CM

## 2022-04-27 PROCEDURE — G9899 SCRN MAM PERF RSLTS DOC: HCPCS | Performed by: FAMILY MEDICINE

## 2022-04-27 PROCEDURE — G8536 NO DOC ELDER MAL SCRN: HCPCS | Performed by: FAMILY MEDICINE

## 2022-04-27 PROCEDURE — 1101F PT FALLS ASSESS-DOCD LE1/YR: CPT | Performed by: FAMILY MEDICINE

## 2022-04-27 PROCEDURE — 3017F COLORECTAL CA SCREEN DOC REV: CPT | Performed by: FAMILY MEDICINE

## 2022-04-27 PROCEDURE — G8420 CALC BMI NORM PARAMETERS: HCPCS | Performed by: FAMILY MEDICINE

## 2022-04-27 PROCEDURE — G8753 SYS BP > OR = 140: HCPCS | Performed by: FAMILY MEDICINE

## 2022-04-27 PROCEDURE — G8400 PT W/DXA NO RESULTS DOC: HCPCS | Performed by: FAMILY MEDICINE

## 2022-04-27 PROCEDURE — G8510 SCR DEP NEG, NO PLAN REQD: HCPCS | Performed by: FAMILY MEDICINE

## 2022-04-27 PROCEDURE — 1090F PRES/ABSN URINE INCON ASSESS: CPT | Performed by: FAMILY MEDICINE

## 2022-04-27 PROCEDURE — G8427 DOCREV CUR MEDS BY ELIG CLIN: HCPCS | Performed by: FAMILY MEDICINE

## 2022-04-27 PROCEDURE — 99213 OFFICE O/P EST LOW 20 MIN: CPT | Performed by: FAMILY MEDICINE

## 2022-04-27 PROCEDURE — G8754 DIAS BP LESS 90: HCPCS | Performed by: FAMILY MEDICINE

## 2022-04-27 NOTE — PATIENT INSTRUCTIONS
Digna Issa with [facility]  [address] [place] [address], [facility] [address] Dayana Jimmie [facility]  [phone]    Monitor blood pressure outside the office several times weekly at different times during the day and evening. Bring the record to me in 3 weeks for review.     Blood Pressure Record     Patient Name:  ______________________ :  ______________________    Date/Time BP Reading Pulse

## 2022-04-27 NOTE — PROGRESS NOTES
[facility]    History of Present Illness:   Rey Pepper is a 66 y.o. female with history of HTN, Seizure disorder, Glaucoma, Anxiety  CC: Skin rash  History provided by patient and Records    HPI:  Noting 2 weeks ago developed several flat and dark lesions on the right arm and side that were burning and itching in characteristic. Resolved with use of topical benadryl. Developed similar rash on the left arm 2 days ago, though has not tried topical.  Patient denies any trauma or known exposure, or recent illness. Denies any Nausea, vomiting, fevers, chills. No medication changes. Health Maintenance  Health Maintenance Due   Topic Date Due    Shingrix Vaccine Age 49> (1 of 2) Never done    COVID-19 Vaccine (2 - Booster for [employer] series) 07/30/2021    Lipid Screen  01/14/2022    Medicare Yearly Exam  04/16/2022       Past Medical, Family, and Social History:     Current Outpatient Medications on File Prior to Visit   Medication Sig Dispense Refill    hydroCHLOROthiazide (HYDRODIURIL) 25 mg tablet Take 1 tablet by mouth once daily 30 Tablet 2    lisinopriL (PRINIVIL, ZESTRIL) 10 mg tablet Take 1 tablet by mouth once daily 30 Tablet 2    atorvastatin (LIPITOR) 20 mg tablet Take 1 Tablet by mouth daily. 30 Tablet 5    hydrOXYzine HCL (ATARAX) 25 mg tablet Take 1 Tab by mouth three (3) times daily as needed for Anxiety. 30 Tab 3    latanoprost (XALATAN) 0.005 % ophthalmic solution INSTILL 1 DROP INTO EACH EYE AT BEDTIME  3    phenytoin ER (DILANTIN ER) 100 mg ER capsule TAKE 3 CAPSULES BY MOUTH AT BEDTIME 90 Cap 0    timolol (TIMOPTIC) 0.5 % ophthalmic solution 1 Drop two (2) times a day.  aspirin delayed-release 81 mg tablet Take 1 Tab by mouth daily. 100 Tab 3     No current facility-administered medications on file prior to visit.        Patient Active Problem List   Diagnosis Code    Seizure disorder ([facility] [address] G40.909    Essential hypertension I10    Primary open angle glaucoma H40.1190    Primary open angle glaucoma of both eyes, severe stage H40.1133    Age-related nuclear cataract of both eyes H25.13    Anxiety F41.9       Social History     Socioeconomic History    Marital status:    Tobacco Use    Smoking status: Never Smoker    Smokeless tobacco: Never Used   Substance and Sexual Activity    Alcohol use: Yes    Drug use: No    Sexual activity: Yes     Partners: Male        Review of Systems   Review of Systems   Constitutional: Negative for chills and fever. Eyes: Negative for blurred vision and double vision. Gastrointestinal: Negative for nausea and vomiting. Skin: Positive for itching and rash. Objective:     Visit Vitals  BP (!) 160/79   Pulse 72   Temp 98.4 °F (36.9 °C)   Resp 16   Ht 5' 3\" (1.6 m)   Wt 128 lb (58.1 kg)   SpO2 100%   BMI 22.67 kg/m²        Physical Exam  Constitutional:       Appearance: Normal appearance. HENT:      Head: Normocephalic and atraumatic. Cardiovascular:      Rate and Rhythm: Normal rate and regular rhythm. Pulses: Normal pulses. Heart sounds: Normal heart sounds. No murmur heard. No friction rub. No gallop. Pulmonary:      Effort: Pulmonary effort is normal.      Breath sounds: Normal breath sounds. Abdominal:      General: Abdomen is flat. Bowel sounds are normal.      Palpations: Abdomen is soft. Musculoskeletal:      Cervical back: Normal range of motion and neck supple. Skin:     Comments:  Non-blanching, flat and dark patches on the left arm. Neurological:      Mental Status: She is alert. Pertinent Labs/Studies:      Assessment and orders:       ICD-10-CM ICD-9-CM    1. Rash  R21 782.1      Diagnoses and all orders for this visit:    1. Rash: Continue Topical cream and call if needed. Follow up on 5/5 with PCP. Follow-up and Dispositions    · Return in about 8 days (around 5/5/2022).            I have discussed the diagnosis with the patient and the intended plan as seen in the above orders. Social history, medical history, and labs were reviewed. The patient has received an after-visit summary and questions were answered concerning future plans. I have discussed medication side effects and warnings with the patient as well.     MD ROBERT Heller & RON WHITEHEAD [facility]  04/27/22

## 2022-04-27 NOTE — PROGRESS NOTES
1. Have you been to the ER, urgent care clinic since your last visit? Hospitalized since your last visit? No    2. Have you seen or consulted any other health care providers outside of the [address] [place] [address] since your last visit? Include any pap smears or colon screening. No  Reviewed record in preparation for visit and have necessary documentation  Pt did not bring medication to office visit for review  opportunity was given for questions      3. For patients aged 39-70: Has the patient had a colonoscopy / FIT/ Cologuard? No      If the patient is female:    4. For patients aged 41-77: Has the patient had a mammogram within the past 2 years? NA - based on age or sex      11. For patients aged 21-65: Has the patient had a pap smear?  NA - based on age or sex      Goals that were addressed and/or need to be completed during or after this appointment include   Health Maintenance Due   Topic Date Due    Shingrix Vaccine Age 49> (1 of 2) Never done    COVID-19 Vaccine (2 - Booster for [employer] series) 07/30/2021    Lipid Screen  01/14/2022    Medicare Yearly Exam  04/16/2022

## 2022-05-05 ENCOUNTER — OFFICE VISIT (OUTPATIENT)
Dept: FAMILY MEDICINE CLINIC | Age: 72
End: 2022-05-05
Payer: MEDICARE

## 2022-05-05 VITALS
RESPIRATION RATE: 16 BRPM | OXYGEN SATURATION: 100 % | HEIGHT: 63 IN | DIASTOLIC BLOOD PRESSURE: 74 MMHG | TEMPERATURE: 98.3 F | BODY MASS INDEX: 22.5 KG/M2 | HEART RATE: 63 BPM | WEIGHT: 127 LBS | SYSTOLIC BLOOD PRESSURE: 132 MMHG

## 2022-05-05 DIAGNOSIS — E78.2 MIXED HYPERLIPIDEMIA: ICD-10-CM

## 2022-05-05 DIAGNOSIS — R73.03 PRE-DIABETES: ICD-10-CM

## 2022-05-05 DIAGNOSIS — Z00.00 MEDICARE ANNUAL WELLNESS VISIT, SUBSEQUENT: Primary | ICD-10-CM

## 2022-05-05 DIAGNOSIS — G40.909 SEIZURE DISORDER (HCC): ICD-10-CM

## 2022-05-05 DIAGNOSIS — I10 ESSENTIAL HYPERTENSION: ICD-10-CM

## 2022-05-05 PROCEDURE — G8510 SCR DEP NEG, NO PLAN REQD: HCPCS | Performed by: FAMILY MEDICINE

## 2022-05-05 PROCEDURE — G8420 CALC BMI NORM PARAMETERS: HCPCS | Performed by: FAMILY MEDICINE

## 2022-05-05 PROCEDURE — G8536 NO DOC ELDER MAL SCRN: HCPCS | Performed by: FAMILY MEDICINE

## 2022-05-05 PROCEDURE — G9899 SCRN MAM PERF RSLTS DOC: HCPCS | Performed by: FAMILY MEDICINE

## 2022-05-05 PROCEDURE — 1101F PT FALLS ASSESS-DOCD LE1/YR: CPT | Performed by: FAMILY MEDICINE

## 2022-05-05 PROCEDURE — G8400 PT W/DXA NO RESULTS DOC: HCPCS | Performed by: FAMILY MEDICINE

## 2022-05-05 PROCEDURE — G0439 PPPS, SUBSEQ VISIT: HCPCS | Performed by: FAMILY MEDICINE

## 2022-05-05 PROCEDURE — G8752 SYS BP LESS 140: HCPCS | Performed by: FAMILY MEDICINE

## 2022-05-05 PROCEDURE — 1090F PRES/ABSN URINE INCON ASSESS: CPT | Performed by: FAMILY MEDICINE

## 2022-05-05 PROCEDURE — G8427 DOCREV CUR MEDS BY ELIG CLIN: HCPCS | Performed by: FAMILY MEDICINE

## 2022-05-05 PROCEDURE — 99214 OFFICE O/P EST MOD 30 MIN: CPT | Performed by: FAMILY MEDICINE

## 2022-05-05 PROCEDURE — G8754 DIAS BP LESS 90: HCPCS | Performed by: FAMILY MEDICINE

## 2022-05-05 PROCEDURE — 3017F COLORECTAL CA SCREEN DOC REV: CPT | Performed by: FAMILY MEDICINE

## 2022-05-05 NOTE — PROGRESS NOTES
1. Have you been to the ER, urgent care clinic since your last visit? Hospitalized since your last visit? No    2. Have you seen or consulted any other health care providers outside of the [address] [place] [address] since your last visit? Include any pap smears or colon screening. No  Reviewed record in preparation for visit and have necessary documentation  Pt did not bring medication to office visit for review  opportunity was given for questions      3. For patients aged 39-70: Has the patient had a colonoscopy / FIT/ Cologuard? Yes - no Care Gap present      If the patient is female:    4. For patients aged 41-77: Has the patient had a mammogram within the past 2 years? Yes - no Care Gap present      5. For patients aged 21-65: Has the patient had a pap smear?  NA - based on age or sex      Goals that were addressed and/or need to be completed during or after this appointment include   Health Maintenance Due   Topic Date Due    Shingrix Vaccine Age 49> (1 of 2) Never done    COVID-19 Vaccine (2 - Booster for [employer] series) 07/30/2021    Lipid Screen  01/14/2022    Medicare Yearly Exam  04/16/2022

## 2022-05-05 NOTE — PROGRESS NOTES
This is the Subsequent Medicare Annual Wellness Exam, performed 12 months or more after the Initial AWV or the last Subsequent AWV    I have reviewed the patient's medical history in detail and updated the computerized patient record. Assessment/Plan   Education and counseling provided:  Are appropriate based on today's review and evaluation  Pneumococcal Vaccine  Shingles vaccine, TDaP - pt declines all vaccines    1. Medicare annual wellness visit, subsequent       Depression Risk Factor Screening     3 most recent PHQ Screens 5/5/2022   Little interest or pleasure in doing things Not at all   Feeling down, depressed, irritable, or hopeless Not at all   Total Score PHQ 2 0       Alcohol & Drug Abuse Risk Screen    Do you average more than 1 drink per night or more than 7 drinks a week:  No    On any one occasion in the past three months have you have had more than 3 drinks containing alcohol:  No          Functional Ability and Level of Safety    Hearing: Hearing is good. Has tinnitus      Activities of Daily Living: The home contains: handrails and grab bars  Patient does total self care      Ambulation: Uses a cane just when she walks to the mailbox for balance and in case she runs into an animal     Fall Risk:  Fall Risk Assessment, last 12 mths 5/5/2022   Able to walk? Yes   Fall in past 12 months? 0   Do you feel unsteady?  0   Are you worried about falling 0      Abuse Screen:  Patient is not abused       Cognitive Screening    Has your family/caregiver stated any concerns about your memory: no     Cognitive Screening: Normal - Mini Cog Test 26/28 for high school level of education    Health Maintenance Due     Health Maintenance Due   Topic Date Due    Shingrix Vaccine Age 50> (1 of 2) Never done    COVID-19 Vaccine (2 - Booster for [employer] series) 07/30/2021    Lipid Screen  01/14/2022       Patient Care Team   Patient Care Team:  Jacqueline Weaver MD as PCP - General (Family Medicine)  Pinky Nation MD as PCP [facility]  Madhuri Anthony, [address] (Optometry)  Glory Toht MD (Ophthalmology)    History     Patient Active Problem List   Diagnosis Code    Seizure disorder [facility]) G40.909    Essential hypertension I10    Primary open angle glaucoma H40.1190    Primary open angle glaucoma of both eyes, severe stage H40.1133    Age-related nuclear cataract of both eyes H25.13    Anxiety F41.9     Past Medical History:   Diagnosis Date    Essential hypertension 11/2/2016    HLD (hyperlipidemia)     [facility]ure ([facility] [address]     Seizures ([facility] [address]       No past surgical history on file. Current Outpatient Medications   Medication Sig Dispense Refill    hydroCHLOROthiazide (HYDRODIURIL) 25 mg tablet Take 1 tablet by mouth once daily 30 Tablet 2    lisinopriL (PRINIVIL, ZESTRIL) 10 mg tablet Take 1 tablet by mouth once daily 30 Tablet 2    atorvastatin (LIPITOR) 20 mg tablet Take 1 Tablet by mouth daily. 30 Tablet 5    hydrOXYzine HCL (ATARAX) 25 mg tablet Take 1 Tab by mouth three (3) times daily as needed for Anxiety. 30 Tab 3    latanoprost (XALATAN) 0.005 % ophthalmic solution INSTILL 1 DROP INTO EACH EYE AT BEDTIME  3    phenytoin ER (DILANTIN ER) 100 mg ER capsule TAKE 3 CAPSULES BY MOUTH AT BEDTIME 90 Cap 0    timolol (TIMOPTIC) 0.5 % ophthalmic solution 1 Drop two (2) times a day.  aspirin delayed-release 81 mg tablet Take 1 Tab by mouth daily.  100 Tab 3     No Known Allergies    Family History   Problem Relation Age of Onset    Stroke Father     Heart Disease Sister      Social History     Tobacco Use    Smoking status: Never Smoker    Smokeless tobacco: Never Used   Substance Use Topics    Alcohol use: Yes         Jay Jay May MD

## 2022-05-05 NOTE — PATIENT INSTRUCTIONS
Digna Issa with [facility]  [address] [place] [address], [facility] [address] [place] [facility]  [phone]    Monitor blood pressure outside the office several times weekly at different times during the day and evening. Bring the record to me in 3 weeks for review. Blood Pressure Record     Patient Name:  ______________________ :  ______________________    Date/Time BP Reading Pulse                                                                                                                                                                                             Medicare Wellness Visit, Female     The best way to live healthy is to have a lifestyle where you eat a well-balanced diet, exercise regularly, limit alcohol use, and quit all forms of tobacco/nicotine, if applicable. Regular preventive services are another way to keep healthy. Preventive services (vaccines, screening tests, monitoring & exams) can help personalize your care plan, which helps you manage your own care. Screening tests can find health problems at the earliest stages, when they are easiest to treat. [facility] follows the current, evidence-based guidelines published by the [place] (USPSTF) when recommending preventive services for our patients. Because we follow these guidelines, sometimes recommendations change over time as research supports it. (For example, mammograms used to be recommended annually. Even though Medicare will still pay for an annual mammogram, the newer guidelines recommend a mammogram every two years for women of average risk). Of course, you and your doctor may decide to screen more often for some diseases, based on your risk and your co-morbidities (chronic disease you are already diagnosed with).      Preventive services for you include:  - Medicare offers their members a free annual wellness visit, which is time for you and your primary care provider to discuss and plan for your preventive service needs. Take advantage of this benefit every year!  -All adults over the age of 72 should receive the recommended pneumonia vaccines. Current USPSTF guidelines recommend a series of two vaccines for the best pneumonia protection.   -All adults should have a flu vaccine yearly and a tetanus vaccine every 10 years.   -All adults age 48 and older should receive the shingles vaccines (series of two vaccines). -All adults age 38-68 who are overweight should have a diabetes screening test once every three years.   -All adults born between [birth year] and 1965 should be screened once for Hepatitis C.  -Other screening tests and preventive services for persons with diabetes include: an eye exam to screen for diabetic retinopathy, a kidney function test, a foot exam, and stricter control over your cholesterol.   -Cardiovascular screening for adults with routine risk involves an electrocardiogram (ECG) at intervals determined by your doctor.   -Colorectal cancer screenings should be done for adults age 54-65 with no increased risk factors for colorectal cancer. There are a number of acceptable methods of screening for this type of cancer. Each test has its own benefits and drawbacks. Discuss with your doctor what is most appropriate for you during your annual wellness visit. The different tests include: colonoscopy (considered the best screening method), a fecal occult blood test, a fecal DNA test, and sigmoidoscopy.    -A bone mass density test is recommended when a woman turns 65 to screen for osteoporosis. This test is only recommended one time, as a screening. Some providers will use this same test as a disease monitoring tool if you already have osteoporosis.   -Breast cancer screenings are recommended every other year for women of normal risk, age 54-69.  -Cervical cancer screenings for women over age 72 are only recommended with certain risk factors.      Here is a list of your current Health Maintenance items (your personalized list of preventive services) with a due date:  Health Maintenance Due   Topic Date Due    Shingles Vaccine (1 of 2) Never done    COVID-19 Vaccine (2 - Booster for [employer] series) 07/30/2021    Cholesterol Test   01/14/2022

## 2022-05-05 NOTE — PROGRESS NOTES
CC: f/u HTN and HLD    HPI: Pt is a 66 y.o. female who presents for f/u HTN and HLD. HTN:  Checking BPs at home?: YES  Logs today?: NO  Range of BPs?: 120-130's/70's  Headaches?: NO  Blurry vision?: NO  Lower extremity edema?: NO  Smoking?: NO      Past Medical History:   Diagnosis Date    Essential hypertension 11/2/2016    HLD (hyperlipidemia)     [facility]     [facility]        Family History   Problem Relation Age of Onset    Stroke Father     Heart Disease Sister        Social History     Tobacco Use    Smoking status: Never Smoker    Smokeless tobacco: Never Used   Substance Use Topics    Alcohol use: Yes    Drug use: No       ROS:  Per HPI    PE:  Visit Vitals  /74   Pulse 63   Temp 98.3 °F (36.8 °C)   Resp 16   Ht 5' 3\" (1.6 m)   Wt 127 lb (57.6 kg)   SpO2 100%   BMI 22.50 kg/m²     Gen: Pt sitting in chair, in NAD  Head: Normocephalic, atraumatic  Eyes: Sclera anicteric, EOM grossly intact, PERRL  Nose: Normal nasal mucosa  Throat: MMM, normal lips, tongue and gums  Neck: Supple, no LAD, no thyromegaly or carotid bruits  CVS: Normal S1, S2, no m/r/g  Resp: CTAB, no wheezes or rales  Extrem: Atraumatic, no cyanosis or edema  Pulses: 2+   Skin: Warm, dry  Neuro: Alert, oriented, appropriate      A/P:   Encounter Diagnoses     ICD-10-CM ICD-9-CM   1. Medicare annual wellness visit, subsequent  Z00.00 V70.0   2. Essential hypertension  I10 401.9   3. Pre-diabetes  R73.03 790.29   4. Mixed hyperlipidemia  E78.2 272.2   5. Seizure disorder ([facility]  G40.909 345.90     1. Medicare annual wellness visit, subsequent: See  note from today    2. Essential hypertension: BP initially elevated after walking down [address] but improved back to baseline after resting.   - Continue lisinopril and HCTZ  - METABOLIC PANEL, COMPREHENSIVE; Future  - METABOLIC PANEL, COMPREHENSIVE    3. Pre-diabetes:  - HEMOGLOBIN A1C WITH EAG; Future  - HEMOGLOBIN A1C WITH EAG    4.  Mixed hyperlipidemia:   - LIPID PANEL; Future  - LIPID PANEL    5. Seizure disorder ([facility] [address] [facility]): Stable, pt has not had any seizures recently. - Continue phenytoin ER       RTC in 6 months for f/u chronic conditions, or sooner prn    Discussed diagnoses in detail with patient. Medication risks/benefits/side effects discussed with patient. All of the patient's questions were addressed. The patient understands and agrees with our plan of care. The patient knows to call back if they are unsure of or forget any changes we discussed today or if the symptoms change. The patient received an After-Visit Summary which contains VS, orders, medication list and allergy list. This can be used as a \"mini-medical record\" should they have to seek medical care while out of town. Current Outpatient Medications on File Prior to Visit   Medication Sig Dispense Refill    hydroCHLOROthiazide (HYDRODIURIL) 25 mg tablet Take 1 tablet by mouth once daily 30 Tablet 2    lisinopriL (PRINIVIL, ZESTRIL) 10 mg tablet Take 1 tablet by mouth once daily 30 Tablet 2    atorvastatin (LIPITOR) 20 mg tablet Take 1 Tablet by mouth daily. 30 Tablet 5    hydrOXYzine HCL (ATARAX) 25 mg tablet Take 1 Tab by mouth three (3) times daily as needed for Anxiety. 30 Tab 3    latanoprost (XALATAN) 0.005 % ophthalmic solution INSTILL 1 DROP INTO EACH EYE AT BEDTIME  3    phenytoin ER (DILANTIN ER) 100 mg ER capsule TAKE 3 CAPSULES BY MOUTH AT BEDTIME 90 Cap 0    timolol (TIMOPTIC) 0.5 % ophthalmic solution 1 Drop two (2) times a day.  aspirin delayed-release 81 mg tablet Take 1 Tab by mouth daily. 100 Tab 3     No current facility-administered medications on file prior to visit.

## 2022-05-06 LAB
ALBUMIN SERPL-MCNC: 3.6 G/DL (ref 3.5–5)
ALBUMIN/GLOB SERPL: 1 {RATIO} (ref 1.1–2.2)
ALP SERPL-CCNC: 89 U/L (ref 45–117)
ALT SERPL-CCNC: 22 U/L (ref 12–78)
ANION GAP SERPL CALC-SCNC: 7 MMOL/L (ref 5–15)
AST SERPL-CCNC: 20 U/L (ref 15–37)
BILIRUB SERPL-MCNC: 0.3 MG/DL (ref 0.2–1)
BUN SERPL-MCNC: 25 MG/DL (ref 6–20)
BUN/CREAT SERPL: 27 (ref 12–20)
CALCIUM SERPL-MCNC: 9.3 MG/DL (ref 8.5–10.1)
CHLORIDE SERPL-SCNC: 104 MMOL/L (ref 97–108)
CHOLEST SERPL-MCNC: 239 MG/DL
CO2 SERPL-SCNC: 29 MMOL/L (ref 21–32)
CREAT SERPL-MCNC: 0.91 MG/DL (ref 0.55–1.02)
EST. AVERAGE GLUCOSE BLD GHB EST-MCNC: 128 MG/DL
GLOBULIN SER CALC-MCNC: 3.7 G/DL (ref 2–4)
GLUCOSE SERPL-MCNC: 87 MG/DL (ref 65–100)
HBA1C MFR BLD: 6.1 % (ref 4–5.6)
HDLC SERPL-MCNC: 108 MG/DL
HDLC SERPL: 2.2 {RATIO} (ref 0–5)
LDLC SERPL CALC-MCNC: 109.8 MG/DL (ref 0–100)
POTASSIUM SERPL-SCNC: 4 MMOL/L (ref 3.5–5.1)
PROT SERPL-MCNC: 7.3 G/DL (ref 6.4–8.2)
SODIUM SERPL-SCNC: 140 MMOL/L (ref 136–145)
TRIGL SERPL-MCNC: 106 MG/DL (ref ?–150)
VLDLC SERPL CALC-MCNC: 21.2 MG/DL

## 2022-05-11 DIAGNOSIS — I10 ESSENTIAL HYPERTENSION: ICD-10-CM

## 2022-05-11 RX ORDER — LISINOPRIL 10 MG/1
TABLET ORAL
Qty: 30 TABLET | Refills: 5 | Status: SHIPPED | OUTPATIENT
Start: 2022-05-11

## 2022-05-13 ENCOUNTER — OFFICE VISIT (OUTPATIENT)
Dept: FAMILY MEDICINE CLINIC | Age: 72
End: 2022-05-13
Payer: MEDICARE

## 2022-05-13 VITALS
HEIGHT: 63 IN | HEART RATE: 65 BPM | OXYGEN SATURATION: 100 % | WEIGHT: 127 LBS | BODY MASS INDEX: 22.5 KG/M2 | DIASTOLIC BLOOD PRESSURE: 63 MMHG | TEMPERATURE: 97.5 F | SYSTOLIC BLOOD PRESSURE: 149 MMHG | RESPIRATION RATE: 16 BRPM

## 2022-05-13 DIAGNOSIS — R21 RASH: Primary | ICD-10-CM

## 2022-05-13 DIAGNOSIS — I10 ESSENTIAL HYPERTENSION: ICD-10-CM

## 2022-05-13 PROCEDURE — G8400 PT W/DXA NO RESULTS DOC: HCPCS | Performed by: FAMILY MEDICINE

## 2022-05-13 PROCEDURE — 3017F COLORECTAL CA SCREEN DOC REV: CPT | Performed by: FAMILY MEDICINE

## 2022-05-13 PROCEDURE — 99213 OFFICE O/P EST LOW 20 MIN: CPT | Performed by: FAMILY MEDICINE

## 2022-05-13 PROCEDURE — G8754 DIAS BP LESS 90: HCPCS | Performed by: FAMILY MEDICINE

## 2022-05-13 PROCEDURE — G8427 DOCREV CUR MEDS BY ELIG CLIN: HCPCS | Performed by: FAMILY MEDICINE

## 2022-05-13 PROCEDURE — G8753 SYS BP > OR = 140: HCPCS | Performed by: FAMILY MEDICINE

## 2022-05-13 PROCEDURE — G8420 CALC BMI NORM PARAMETERS: HCPCS | Performed by: FAMILY MEDICINE

## 2022-05-13 PROCEDURE — 1101F PT FALLS ASSESS-DOCD LE1/YR: CPT | Performed by: FAMILY MEDICINE

## 2022-05-13 PROCEDURE — G8510 SCR DEP NEG, NO PLAN REQD: HCPCS | Performed by: FAMILY MEDICINE

## 2022-05-13 PROCEDURE — G8536 NO DOC ELDER MAL SCRN: HCPCS | Performed by: FAMILY MEDICINE

## 2022-05-13 PROCEDURE — G9899 SCRN MAM PERF RSLTS DOC: HCPCS | Performed by: FAMILY MEDICINE

## 2022-05-13 PROCEDURE — 1090F PRES/ABSN URINE INCON ASSESS: CPT | Performed by: FAMILY MEDICINE

## 2022-05-13 RX ORDER — TRIAMCINOLONE ACETONIDE 0.25 MG/G
CREAM TOPICAL 2 TIMES DAILY
Qty: 15 G | Refills: 0 | Status: SHIPPED | OUTPATIENT
Start: 2022-05-13

## 2022-05-13 NOTE — PROGRESS NOTES
CC: Rash    HPI: Pt is a 66 y.o. female who presents for rash. Last week she had rash on her arms that looked like little bites and then resolved. Now she has similar spots on her left sided neck. Sometimes they itch and sting. She has not tried anything on them. Last week her  had some similar spots but they have since resolved. She denies new soaps, detergents, clothing and foods. They do not have pets but there's a neighbor's dog that she plays with sometimes. She has also been outside some lately. Past Medical History:   Diagnosis Date    Essential hypertension 11/2/2016    HLD (hyperlipidemia)     Se[facility] ([facility]     Seizures ([facility]        Family History   Problem Relation Age of Onset    Stroke Father     Heart Disease Sister        Social History     Tobacco Use    Smoking status: Never Smoker    Smokeless tobacco: Never Used   Substance Use Topics    Alcohol use: Yes    Drug use: No       ROS:  Per HPI    PE:  Visit Vitals  BP (!) 149/63   Pulse 65   Temp 97.5 °F (36.4 °C)   Resp 16   Ht 5' 3\" (1.6 m)   Wt 127 lb (57.6 kg)   SpO2 100%   BMI 22.50 kg/m²     Gen: Pt sitting in chair, in NAD  Head: Normocephalic, atraumatic  Eyes: Sclera anicteric, EOM grossly intact, PERRL  Throat: Mask in place  Neck: Supple  CVS: Normal S1, S2, no m/r/g  Resp: CTAB, no wheezes or rales  Extrem: Atraumatic, no cyanosis or edema  Pulses: 2+   Skin: Warm, dry. Four ~8mm mildly erythematous papules on left sided neck  Neuro: Alert, oriented, appropriate      A/P:   Encounter Diagnoses     ICD-10-CM ICD-9-CM   1. Rash  R21 782.1   2. Essential hypertension  I10 401.9     1. Rash: Looks most c/w insect bites. Pt concerned about shingles or medication reaction. Reviewed pictures of those types of rashes and provided reassurance. She does not have any new medications. - triamcinolone acetonide (KENALOG) 0.025 % topical cream; Apply  to affected area two (2) times a day. use thin layer. Apply to skin on neck. Dispense: 15 g; Refill: 0    2. Essential hypertension: BP elevated but improved some on recheck. Pt reports /70 this morning at home and has been ranging like this. Will continue current medications for now and pt to call if home BP's start to rise. RTC prn if symptoms worsen or fail to improve    Discussed diagnoses in detail with patient. Medication risks/benefits/side effects discussed with patient. All of the patient's questions were addressed. The patient understands and agrees with our plan of care. The patient knows to call back if they are unsure of or forget any changes we discussed today or if the symptoms change. The patient received an After-Visit Summary which contains VS, orders, medication list and allergy list. This can be used as a \"mini-medical record\" should they have to seek medical care while out of town. Current Outpatient Medications on File Prior to Visit   Medication Sig Dispense Refill    lisinopriL (PRINIVIL, ZESTRIL) 10 mg tablet Take 1 tablet by mouth once daily 30 Tablet 5    hydroCHLOROthiazide (HYDRODIURIL) 25 mg tablet Take 1 tablet by mouth once daily 30 Tablet 2    atorvastatin (LIPITOR) 20 mg tablet Take 1 Tablet by mouth daily. 30 Tablet 5    hydrOXYzine HCL (ATARAX) 25 mg tablet Take 1 Tab by mouth three (3) times daily as needed for Anxiety. 30 Tab 3    latanoprost (XALATAN) 0.005 % ophthalmic solution INSTILL 1 DROP INTO EACH EYE AT BEDTIME  3    phenytoin ER (DILANTIN ER) 100 mg ER capsule TAKE 3 CAPSULES BY MOUTH AT BEDTIME 90 Cap 0    timolol (TIMOPTIC) 0.5 % ophthalmic solution 1 Drop two (2) times a day.  aspirin delayed-release 81 mg tablet Take 1 Tab by mouth daily. 100 Tab 3     No current facility-administered medications on file prior to visit.

## 2022-05-13 NOTE — PROGRESS NOTES
1. Have you been to the ER, urgent care clinic since your last visit? Hospitalized since your last visit? No    2. Have you seen or consulted any other health care providers outside of the [address] [place] [address] since your last visit? Include any pap smears or colon screening. No  Reviewed record in preparation for visit and have necessary documentation  Pt did not bring medication to office visit for review  opportunity was given for questions      3. For patients aged 39-70: Has the patient had a colonoscopy / FIT/ Cologuard? Yes - no Care Gap present      If the patient is female:    4. For patients aged 41-77: Has the patient had a mammogram within the past 2 years? Yes - no Care Gap present      5. For patients aged 21-65: Has the patient had a pap smear?  NA - based on age or sex      Goals that were addressed and/or need to be completed during or after this appointment include   Health Maintenance Due   Topic Date Due    Shingrix Vaccine Age 49> (1 of 2) Never done    COVID-19 Vaccine (2 - Booster for [employer] series) 07/30/2021

## 2022-05-13 NOTE — PATIENT INSTRUCTIONS
Rash: Care Instructions  Your Care Instructions  A rash is any irritation or inflammation of the skin. Rashes have many possible causes, including allergy, infection, illness, heat, and emotional stress. Follow-up care is a key part of your treatment and safety. Be sure to make and go to all appointments, and call your doctor if you are having problems. It's also a good idea to know your test results and keep a list of the medicines you take. How can you care for yourself at home? · Wash the area with water only. Soap can make dryness and itching worse. Pat dry. · Put cold, wet cloths on the rash to reduce itching. · Keep cool, and stay out of the sun. · Leave the rash open to the air as much of the time as possible. · Sometimes petroleum jelly (Vaseline) can help relieve the discomfort caused by a rash. A moisturizing lotion, such as Cetaphil, also may help. Calamine lotion may help for rashes caused by contact with something (such as a plant or soap) that irritated the skin. Use it 3 or 4 times a day. · If your doctor prescribed a cream, use it as directed. If your doctor prescribed medicine, take it exactly as directed. · If itching affects your sleep, ask your doctor if you can take an antihistamine that might reduce itching and make you sleepy, such as diphenhydramine (Benadryl). Be safe with medicines. Read and follow all instructions on the label. When should you call for help? Call your doctor now or seek immediate medical care if:    · You have signs of infection, such as:  ? Increased pain, swelling, warmth, or redness. ? Red streaks leading from the area. ? Pus draining from the area. ? A fever.     · You have joint pain along with the rash. Watch closely for changes in your health, and be sure to contact your doctor if:    · Your rash is changing or getting worse.  For example, call if you have pain along with the rash, the rash is spreading, or you have new blisters.     · You do not get better after 1 week. Where can you learn more? Go to http://www.gray.com/  Enter U711 in the search box to learn more about \"Rash: Care Instructions. \"  Current as of: November 15, 2021               Content Version: 13.2  © 6276-0921 [employer]. Care instructions adapted under license by [employer] (which disclaims liability or warranty for this information). If you have questions about a medical condition or this instruction, always ask your healthcare professional. [address] any warranty or liability for your use of this information.

## 2022-06-24 DIAGNOSIS — E78.2 MIXED HYPERLIPIDEMIA: ICD-10-CM

## 2022-06-24 DIAGNOSIS — I10 ESSENTIAL HYPERTENSION: ICD-10-CM

## 2022-06-24 RX ORDER — HYDROCHLOROTHIAZIDE 25 MG/1
TABLET ORAL
Qty: 30 TABLET | Refills: 5 | Status: SHIPPED | OUTPATIENT
Start: 2022-06-24

## 2022-06-24 RX ORDER — ATORVASTATIN CALCIUM 20 MG/1
TABLET, FILM COATED ORAL
Qty: 30 TABLET | Refills: 5 | Status: SHIPPED | OUTPATIENT
Start: 2022-06-24

## 2022-12-06 DIAGNOSIS — I10 ESSENTIAL HYPERTENSION: ICD-10-CM

## 2022-12-08 RX ORDER — LISINOPRIL 10 MG/1
TABLET ORAL
Qty: 30 TABLET | Refills: 0 | Status: SHIPPED | OUTPATIENT
Start: 2022-12-08

## 2022-12-09 NOTE — TELEPHONE ENCOUNTER
Attempted to call. unsuccessful. . message left  Pt will need to schedule an appt for chronic conditions / medication refills

## 2023-01-02 DIAGNOSIS — I10 ESSENTIAL HYPERTENSION: ICD-10-CM

## 2023-01-03 RX ORDER — HYDROCHLOROTHIAZIDE 25 MG/1
TABLET ORAL
Qty: 30 TABLET | Refills: 5 | Status: SHIPPED | OUTPATIENT
Start: 2023-01-03

## 2023-01-12 ENCOUNTER — OFFICE VISIT (OUTPATIENT)
Dept: FAMILY MEDICINE CLINIC | Age: 73
End: 2023-01-12
Payer: MEDICARE

## 2023-01-12 VITALS
HEIGHT: 63 IN | SYSTOLIC BLOOD PRESSURE: 131 MMHG | HEART RATE: 68 BPM | RESPIRATION RATE: 16 BRPM | WEIGHT: 124 LBS | OXYGEN SATURATION: 100 % | BODY MASS INDEX: 21.97 KG/M2 | DIASTOLIC BLOOD PRESSURE: 82 MMHG | TEMPERATURE: 98.1 F

## 2023-01-12 DIAGNOSIS — E78.2 MIXED HYPERLIPIDEMIA: ICD-10-CM

## 2023-01-12 DIAGNOSIS — G40.909 SEIZURE DISORDER (HCC): ICD-10-CM

## 2023-01-12 DIAGNOSIS — I10 ESSENTIAL HYPERTENSION: Primary | ICD-10-CM

## 2023-01-12 PROCEDURE — 3017F COLORECTAL CA SCREEN DOC REV: CPT | Performed by: FAMILY MEDICINE

## 2023-01-12 PROCEDURE — 3075F SYST BP GE 130 - 139MM HG: CPT | Performed by: FAMILY MEDICINE

## 2023-01-12 PROCEDURE — G8427 DOCREV CUR MEDS BY ELIG CLIN: HCPCS | Performed by: FAMILY MEDICINE

## 2023-01-12 PROCEDURE — G8420 CALC BMI NORM PARAMETERS: HCPCS | Performed by: FAMILY MEDICINE

## 2023-01-12 PROCEDURE — 1123F ACP DISCUSS/DSCN MKR DOCD: CPT | Performed by: FAMILY MEDICINE

## 2023-01-12 PROCEDURE — G8400 PT W/DXA NO RESULTS DOC: HCPCS | Performed by: FAMILY MEDICINE

## 2023-01-12 PROCEDURE — 3079F DIAST BP 80-89 MM HG: CPT | Performed by: FAMILY MEDICINE

## 2023-01-12 PROCEDURE — 1090F PRES/ABSN URINE INCON ASSESS: CPT | Performed by: FAMILY MEDICINE

## 2023-01-12 PROCEDURE — G8432 DEP SCR NOT DOC, RNG: HCPCS | Performed by: FAMILY MEDICINE

## 2023-01-12 PROCEDURE — 1101F PT FALLS ASSESS-DOCD LE1/YR: CPT | Performed by: FAMILY MEDICINE

## 2023-01-12 PROCEDURE — 99214 OFFICE O/P EST MOD 30 MIN: CPT | Performed by: FAMILY MEDICINE

## 2023-01-12 PROCEDURE — G8536 NO DOC ELDER MAL SCRN: HCPCS | Performed by: FAMILY MEDICINE

## 2023-01-12 NOTE — PROGRESS NOTES
CC: follow-up HTN, HLD    HPI: Pt is a 74 y.o. female who presents for follow-up HTN, HLD. She has a h/o seizures well-controlled on Dilantin, and saw her Neurologist a few weeks ago. She has not had a seizure in many years. She has had a few episodes of pain between her breasts. Area is non-tender when this happens and pains last a few seconds and then resolve. They do not radiate and generally occur at rest, when she lays down at night, although she did have one when she was walking (non-strenuously). She has tried taking aspirin and felt like it helped. She had similar symptoms a little over a year ago and had an EKG that was stable from her baseline. She had a stress test and work-up with [facility] in 2019 that was non-concerning.      HTN:  Checking BPs at home?: YES, occasionally  Logs today?: NO  Range of BPs?: 669-546'Z systolic  Headaches?: NO  Blurry vision?: NO  Lower extremity edema?: NO  Smoking?: NO        Past Medical History:   Diagnosis Date    Essential hypertension 11/2/2016    HLD (hyperlipidemia)     Seizure (HCC)     Seizures (HCC)        Family History   Problem Relation Age of Onset    Stroke Father     Heart Disease Sister        Social History     Tobacco Use    Smoking status: Never    Smokeless tobacco: Never   Substance Use Topics    Alcohol use: Yes    Drug use: No       ROS:  Per HPI    PE:  Visit Vitals  /82   Pulse 68   Temp 98.1 °F (36.7 °C)   Resp 16   Ht 5' 3\" (1.6 m)   Wt 124 lb (56.2 kg)   SpO2 100%   BMI 21.97 kg/m²     Gen: Pt sitting in chair, in NAD  Head: Normocephalic, atraumatic  Eyes: Sclera anicteric, EOM grossly intact, PERRL  Nose: Normal nasal mucosa  Throat: MMM, normal lips, tongue and gums  Neck: Supple, no LAD, no thyromegaly or carotid bruits  CVS: Normal S1, S2, no m/r/g  Resp: CTAB, no wheezes or rales  Extrem: Atraumatic, no cyanosis or edema  Pulses: 2+   Skin: Warm, dry  Neuro: Alert, oriented, appropriate      A/P:   Encounter Diagnoses     ICD-10-CM ICD-9-CM   1. Essential hypertension  I10 401.9   2. Mixed hyperlipidemia  E78.2 272.2   3. Seizure disorder ([facility]  G40.909 345.90     1. Essential hypertension: BP fluctuates at home, but looks good today. Will have her continue current regimen and call if home BP's are going outside the 244-176 systolic range. - Continue lisinopril 10mg daily and HCTZ 93SD daily  - METABOLIC PANEL, BASIC; Future  - METABOLIC PANEL, BASIC    2. Mixed hyperlipidemia: Well-controlled on atorvastatin, continue current dose. - LIPID PANEL; Future  - LIPID PANEL    3. Seizure disorder ([facility]): Well-controlled on phenytoin, continue current dose. 4. Chest pain: Does not sound cardiac in nature and pt had normal stress test in 2019. Discussed options of referral back to [facility] for repeat evaluation vs close monitoring and pt calling if symptoms become more frequent or prolonged. She would prefer to monitor it closely for now and will call if she decides to see Cardiology. RTC in 6 months for f/u chronic conditions, or sooner prn      Discussed diagnoses in detail with patient. Medication risks/benefits/side effects discussed with patient. All of the patient's questions were addressed. The patient understands and agrees with our plan of care. The patient knows to call back if they are unsure of or forget any changes we discussed today or if the symptoms change. The patient received an After-Visit Summary which contains VS, orders, medication list and allergy list. This can be used as a \"mini-medical record\" should they have to seek medical care while out of town.     Current Outpatient Medications on File Prior to Visit   Medication Sig Dispense Refill    hydroCHLOROthiazide (HYDRODIURIL) 25 mg tablet Take 1 tablet by mouth once daily 30 Tablet 5    lisinopriL (PRINIVIL, ZESTRIL) 10 mg tablet Take 1 tablet by mouth once daily 30 Tablet 0    atorvastatin (LIPITOR) 20 mg tablet Take 1 tablet by mouth once daily 30 Tablet 5 triamcinolone acetonide (KENALOG) 0.025 % topical cream Apply  to affected area two (2) times a day. use thin layer. Apply to skin on neck. 15 g 0    hydrOXYzine HCL (ATARAX) 25 mg tablet Take 1 Tab by mouth three (3) times daily as needed for Anxiety. 30 Tab 3    latanoprost (XALATAN) 0.005 % ophthalmic solution INSTILL 1 DROP INTO EACH EYE AT BEDTIME  3    phenytoin ER (DILANTIN ER) 100 mg ER capsule TAKE 3 CAPSULES BY MOUTH AT BEDTIME 90 Cap 0    timolol (TIMOPTIC) 0.5 % ophthalmic solution 1 Drop two (2) times a day. aspirin delayed-release 81 mg tablet Take 1 Tab by mouth daily. 100 Tab 3     No current facility-administered medications on file prior to visit.

## 2023-01-12 NOTE — PROGRESS NOTES
1. Have you been to the ER, urgent care clinic since your last visit? Hospitalized since your last visit? No    2. Have you seen or consulted any other health care providers outside of the [address] [address] since your last visit? Include any pap smears or colon screening. No  Reviewed record in preparation for visit and have necessary documentation  Pt did not bring medication to office visit for review  opportunity was given for questions      3. For patients aged 39-70: Has the patient had a colonoscopy / FIT/ Cologuard? Yes - no Care Gap present      If the patient is female:    4. For patients aged 41-77: Has the patient had a mammogram within the past 2 years? Yes - no Care Gap present      5. For patients aged 21-65: Has the patient had a pap smear?  NA - based on age or sex      Goals that were addressed and/or need to be completed during or after this appointment include   Health Maintenance Due   Topic Date Due    DTaP/Tdap/Td series (1 - Tdap) Never done    Shingles Vaccine (1 of 2) Never done    Pneumococcal 65+ years (1 - PCV) Never done    COVID-19 Vaccine (2 - Booster for [employer] series) 07/30/2021    Flu Vaccine (1) Never done

## 2023-01-13 LAB
ANION GAP SERPL CALC-SCNC: 6 MMOL/L (ref 5–15)
BUN SERPL-MCNC: 24 MG/DL (ref 6–20)
BUN/CREAT SERPL: 24 (ref 12–20)
CALCIUM SERPL-MCNC: 9.7 MG/DL (ref 8.5–10.1)
CHLORIDE SERPL-SCNC: 105 MMOL/L (ref 97–108)
CHOLEST SERPL-MCNC: 251 MG/DL
CO2 SERPL-SCNC: 30 MMOL/L (ref 21–32)
CREAT SERPL-MCNC: 1.02 MG/DL (ref 0.55–1.02)
GLUCOSE SERPL-MCNC: 78 MG/DL (ref 65–100)
HDLC SERPL-MCNC: 121 MG/DL
HDLC SERPL: 2.1 (ref 0–5)
LDLC SERPL CALC-MCNC: 116.4 MG/DL (ref 0–100)
POTASSIUM SERPL-SCNC: 3.9 MMOL/L (ref 3.5–5.1)
SODIUM SERPL-SCNC: 141 MMOL/L (ref 136–145)
TRIGL SERPL-MCNC: 68 MG/DL (ref ?–150)
VLDLC SERPL CALC-MCNC: 13.6 MG/DL

## 2023-01-30 DIAGNOSIS — I10 ESSENTIAL HYPERTENSION: ICD-10-CM

## 2023-01-31 RX ORDER — LISINOPRIL 10 MG/1
TABLET ORAL
Qty: 30 TABLET | Refills: 5 | Status: SHIPPED | OUTPATIENT
Start: 2023-01-31

## 2023-06-28 ENCOUNTER — OFFICE VISIT (OUTPATIENT)
Facility: CLINIC | Age: 73
End: 2023-06-28
Payer: MEDICARE

## 2023-06-28 VITALS
DIASTOLIC BLOOD PRESSURE: 66 MMHG | TEMPERATURE: 97.6 F | RESPIRATION RATE: 14 BRPM | SYSTOLIC BLOOD PRESSURE: 136 MMHG | HEIGHT: 63 IN | BODY MASS INDEX: 22.54 KG/M2 | OXYGEN SATURATION: 99 % | WEIGHT: 127.2 LBS | HEART RATE: 62 BPM

## 2023-06-28 DIAGNOSIS — G40.909 SEIZURE DISORDER (HCC): ICD-10-CM

## 2023-06-28 DIAGNOSIS — E78.2 MIXED HYPERLIPIDEMIA: ICD-10-CM

## 2023-06-28 DIAGNOSIS — R73.03 PREDIABETES: ICD-10-CM

## 2023-06-28 DIAGNOSIS — Z79.899 LONG TERM USE OF DRUG: ICD-10-CM

## 2023-06-28 DIAGNOSIS — I10 ESSENTIAL (PRIMARY) HYPERTENSION: Primary | ICD-10-CM

## 2023-06-28 PROCEDURE — 3075F SYST BP GE 130 - 139MM HG: CPT | Performed by: FAMILY MEDICINE

## 2023-06-28 PROCEDURE — G8420 CALC BMI NORM PARAMETERS: HCPCS | Performed by: FAMILY MEDICINE

## 2023-06-28 PROCEDURE — 3017F COLORECTAL CA SCREEN DOC REV: CPT | Performed by: FAMILY MEDICINE

## 2023-06-28 PROCEDURE — 1090F PRES/ABSN URINE INCON ASSESS: CPT | Performed by: FAMILY MEDICINE

## 2023-06-28 PROCEDURE — 1036F TOBACCO NON-USER: CPT | Performed by: FAMILY MEDICINE

## 2023-06-28 PROCEDURE — G8427 DOCREV CUR MEDS BY ELIG CLIN: HCPCS | Performed by: FAMILY MEDICINE

## 2023-06-28 PROCEDURE — 1123F ACP DISCUSS/DSCN MKR DOCD: CPT | Performed by: FAMILY MEDICINE

## 2023-06-28 PROCEDURE — 3078F DIAST BP <80 MM HG: CPT | Performed by: FAMILY MEDICINE

## 2023-06-28 PROCEDURE — G8400 PT W/DXA NO RESULTS DOC: HCPCS | Performed by: FAMILY MEDICINE

## 2023-06-28 PROCEDURE — 99214 OFFICE O/P EST MOD 30 MIN: CPT | Performed by: FAMILY MEDICINE

## 2023-06-28 RX ORDER — HYDROCHLOROTHIAZIDE 25 MG/1
25 TABLET ORAL DAILY
Qty: 90 TABLET | Refills: 1 | Status: SHIPPED | OUTPATIENT
Start: 2023-06-28

## 2023-06-28 RX ORDER — ATORVASTATIN CALCIUM 20 MG/1
20 TABLET, FILM COATED ORAL DAILY
Qty: 90 TABLET | Refills: 1 | Status: SHIPPED | OUTPATIENT
Start: 2023-06-28

## 2023-06-28 SDOH — ECONOMIC STABILITY: INCOME INSECURITY: HOW HARD IS IT FOR YOU TO PAY FOR THE VERY BASICS LIKE FOOD, HOUSING, MEDICAL CARE, AND HEATING?: NOT HARD AT ALL

## 2023-06-28 SDOH — ECONOMIC STABILITY: FOOD INSECURITY: WITHIN THE PAST 12 MONTHS, YOU WORRIED THAT YOUR FOOD WOULD RUN OUT BEFORE YOU GOT MONEY TO BUY MORE.: NEVER TRUE

## 2023-06-28 SDOH — ECONOMIC STABILITY: HOUSING INSECURITY
IN THE LAST 12 MONTHS, WAS THERE A TIME WHEN YOU DID NOT HAVE A STEADY PLACE TO SLEEP OR SLEPT IN A SHELTER (INCLUDING NOW)?: NO

## 2023-06-28 SDOH — ECONOMIC STABILITY: FOOD INSECURITY: WITHIN THE PAST 12 MONTHS, THE FOOD YOU BOUGHT JUST DIDN'T LAST AND YOU DIDN'T HAVE MONEY TO GET MORE.: NEVER TRUE

## 2023-06-28 ASSESSMENT — PATIENT HEALTH QUESTIONNAIRE - PHQ9
6. FEELING BAD ABOUT YOURSELF - OR THAT YOU ARE A FAILURE OR HAVE LET YOURSELF OR YOUR FAMILY DOWN: 0
7. TROUBLE CONCENTRATING ON THINGS, SUCH AS READING THE NEWSPAPER OR WATCHING TELEVISION: 0
5. POOR APPETITE OR OVEREATING: 0
2. FEELING DOWN, DEPRESSED OR HOPELESS: 0
SUM OF ALL RESPONSES TO PHQ QUESTIONS 1-9: 0
9. THOUGHTS THAT YOU WOULD BE BETTER OFF DEAD, OR OF HURTING YOURSELF: 0
3. TROUBLE FALLING OR STAYING ASLEEP: 0
10. IF YOU CHECKED OFF ANY PROBLEMS, HOW DIFFICULT HAVE THESE PROBLEMS MADE IT FOR YOU TO DO YOUR WORK, TAKE CARE OF THINGS AT HOME, OR GET ALONG WITH OTHER PEOPLE: 0
SUM OF ALL RESPONSES TO PHQ QUESTIONS 1-9: 0
4. FEELING TIRED OR HAVING LITTLE ENERGY: 0
8. MOVING OR SPEAKING SO SLOWLY THAT OTHER PEOPLE COULD HAVE NOTICED. OR THE OPPOSITE, BEING SO FIGETY OR RESTLESS THAT YOU HAVE BEEN MOVING AROUND A LOT MORE THAN USUAL: 0
SUM OF ALL RESPONSES TO PHQ QUESTIONS 1-9: 0
SUM OF ALL RESPONSES TO PHQ9 QUESTIONS 1 & 2: 0
SUM OF ALL RESPONSES TO PHQ QUESTIONS 1-9: 0
1. LITTLE INTEREST OR PLEASURE IN DOING THINGS: 0

## 2023-06-28 ASSESSMENT — ENCOUNTER SYMPTOMS
BLOOD IN STOOL: 0
SHORTNESS OF BREATH: 0
COUGH: 0

## 2023-06-29 LAB
ALBUMIN SERPL-MCNC: 3.8 G/DL (ref 3.5–5)
ALBUMIN/GLOB SERPL: 1.1 (ref 1.1–2.2)
ALP SERPL-CCNC: 88 U/L (ref 45–117)
ALT SERPL-CCNC: 26 U/L (ref 12–78)
ANION GAP SERPL CALC-SCNC: 6 MMOL/L (ref 5–15)
AST SERPL-CCNC: 21 U/L (ref 15–37)
BASOPHILS # BLD: 0 K/UL (ref 0–0.1)
BASOPHILS NFR BLD: 1 % (ref 0–1)
BILIRUB SERPL-MCNC: 0.2 MG/DL (ref 0.2–1)
BUN SERPL-MCNC: 24 MG/DL (ref 6–20)
BUN/CREAT SERPL: 27 (ref 12–20)
CALCIUM SERPL-MCNC: 9.8 MG/DL (ref 8.5–10.1)
CHLORIDE SERPL-SCNC: 106 MMOL/L (ref 97–108)
CO2 SERPL-SCNC: 29 MMOL/L (ref 21–32)
CREAT SERPL-MCNC: 0.88 MG/DL (ref 0.55–1.02)
DIFFERENTIAL METHOD BLD: ABNORMAL
EOSINOPHIL # BLD: 0.2 K/UL (ref 0–0.4)
EOSINOPHIL NFR BLD: 6 % (ref 0–7)
ERYTHROCYTE [DISTWIDTH] IN BLOOD BY AUTOMATED COUNT: 15.1 % (ref 11.5–14.5)
GLOBULIN SER CALC-MCNC: 3.5 G/DL (ref 2–4)
GLUCOSE SERPL-MCNC: 77 MG/DL (ref 65–100)
HCT VFR BLD AUTO: 35.8 % (ref 35–47)
HGB BLD-MCNC: 10.9 G/DL (ref 11.5–16)
IMM GRANULOCYTES # BLD AUTO: 0 K/UL (ref 0–0.04)
IMM GRANULOCYTES NFR BLD AUTO: 0 % (ref 0–0.5)
LYMPHOCYTES # BLD: 1 K/UL (ref 0.8–3.5)
LYMPHOCYTES NFR BLD: 26 % (ref 12–49)
MCH RBC QN AUTO: 29.3 PG (ref 26–34)
MCHC RBC AUTO-ENTMCNC: 30.4 G/DL (ref 30–36.5)
MCV RBC AUTO: 96.2 FL (ref 80–99)
MONOCYTES # BLD: 0.5 K/UL (ref 0–1)
MONOCYTES NFR BLD: 12 % (ref 5–13)
NEUTS SEG # BLD: 2.2 K/UL (ref 1.8–8)
NEUTS SEG NFR BLD: 55 % (ref 32–75)
NRBC # BLD: 0 K/UL (ref 0–0.01)
NRBC BLD-RTO: 0 PER 100 WBC
PLATELET # BLD AUTO: 253 K/UL (ref 150–400)
PMV BLD AUTO: 10.6 FL (ref 8.9–12.9)
POTASSIUM SERPL-SCNC: 4.4 MMOL/L (ref 3.5–5.1)
PROT SERPL-MCNC: 7.3 G/DL (ref 6.4–8.2)
RBC # BLD AUTO: 3.72 M/UL (ref 3.8–5.2)
SODIUM SERPL-SCNC: 141 MMOL/L (ref 136–145)
WBC # BLD AUTO: 3.9 K/UL (ref 3.6–11)

## 2023-06-30 LAB
EST. AVERAGE GLUCOSE BLD GHB EST-MCNC: 114 MG/DL
HBA1C MFR BLD: 5.6 % (ref 4–5.6)

## 2023-08-31 RX ORDER — LISINOPRIL 10 MG/1
10 TABLET ORAL DAILY
Qty: 90 TABLET | Refills: 1 | Status: SHIPPED | OUTPATIENT
Start: 2023-08-31

## 2023-08-31 NOTE — TELEPHONE ENCOUNTER
Pt requesting refill on Lisinopril. The pharmacy advised pt that the medication request keep being denied.  Pt uses [facility]

## 2023-09-11 ENCOUNTER — NURSE TRIAGE (OUTPATIENT)
Dept: OTHER | Facility: CLINIC | Age: 73
End: 2023-09-11

## 2023-09-11 NOTE — TELEPHONE ENCOUNTER
[facility]    Received call from Kelsie Lopez at [facility] with [employer]. Subjective: Caller states weakness, headache and cough    Current Symptoms: Nagging cough, headache, fatigue and cannot taste. Denies unilateral weakness. Denies syncope. Denies vomiting and diarrhea    Onset: 1 day ago; sudden    Associated Symptoms:  Denies further symptoms    Pain Severity: 5/10; heavy; constant    Temperature: denies     What has been tried: sinus medication    LMP: NA Pregnant: NA    Recommended disposition: See in Office Today    Care advice provided, patient verbalizes understanding; denies any other questions or concerns; instructed to call back for any new or worsening symptoms. Patient/Caller agrees with recommended disposition; writer provided warm transfer to [employer] at [facility] for appointment scheduling    Attention Provider: Thank you for allowing me to participate in the care of your patient. The patient was connected to triage in response to information provided to the ECC/PSC. Please do not respond through this encounter as the response is not directed to a shared pool.       Reason for Disposition   Patient wants to be seen    Protocols used: Weakness (Generalized) and Fatigue-ADULT-OH

## 2023-09-11 NOTE — TELEPHONE ENCOUNTER
Attempted to call. No answer. Message left. Please advise patient to take an at home covid test and call us with results.

## 2023-09-11 NOTE — TELEPHONE ENCOUNTER
Patient returned call. She states she can not do a home covid test and does not have a way get one. States she does not have a thermometer but does not feel feverish. C/o cough and headache. Appt on 9/13/23 for sick visit.

## 2023-09-13 ENCOUNTER — OFFICE VISIT (OUTPATIENT)
Facility: CLINIC | Age: 73
End: 2023-09-13
Payer: MEDICARE

## 2023-09-13 VITALS
OXYGEN SATURATION: 96 % | DIASTOLIC BLOOD PRESSURE: 71 MMHG | TEMPERATURE: 100.4 F | SYSTOLIC BLOOD PRESSURE: 143 MMHG | WEIGHT: 124 LBS | HEART RATE: 72 BPM | BODY MASS INDEX: 21.97 KG/M2 | RESPIRATION RATE: 18 BRPM | HEIGHT: 63 IN

## 2023-09-13 DIAGNOSIS — U07.1 CLINICAL DIAGNOSIS OF COVID-19: Primary | ICD-10-CM

## 2023-09-13 PROCEDURE — 3017F COLORECTAL CA SCREEN DOC REV: CPT | Performed by: FAMILY MEDICINE

## 2023-09-13 PROCEDURE — 1123F ACP DISCUSS/DSCN MKR DOCD: CPT | Performed by: FAMILY MEDICINE

## 2023-09-13 PROCEDURE — 3077F SYST BP >= 140 MM HG: CPT | Performed by: FAMILY MEDICINE

## 2023-09-13 PROCEDURE — 99212 OFFICE O/P EST SF 10 MIN: CPT | Performed by: FAMILY MEDICINE

## 2023-09-13 PROCEDURE — 1036F TOBACCO NON-USER: CPT | Performed by: FAMILY MEDICINE

## 2023-09-13 PROCEDURE — 1090F PRES/ABSN URINE INCON ASSESS: CPT | Performed by: FAMILY MEDICINE

## 2023-09-13 PROCEDURE — G8427 DOCREV CUR MEDS BY ELIG CLIN: HCPCS | Performed by: FAMILY MEDICINE

## 2023-09-13 PROCEDURE — G8420 CALC BMI NORM PARAMETERS: HCPCS | Performed by: FAMILY MEDICINE

## 2023-09-13 PROCEDURE — 3078F DIAST BP <80 MM HG: CPT | Performed by: FAMILY MEDICINE

## 2023-09-13 PROCEDURE — G8400 PT W/DXA NO RESULTS DOC: HCPCS | Performed by: FAMILY MEDICINE

## 2023-09-13 NOTE — PROGRESS NOTES
1. \"Have you been to the ER, urgent care clinic since your last visit? Hospitalized since your last visit? \" NO    2. \"Have you seen or consulted any other health care providers outside of the [address] [place] [address] since your last visit? \" no    3. For patients aged 43-73: Has the patient had a colonoscopy / FIT/ Cologuard? N/A      If the patient is female:    4. For patients aged 43-66: Has the patient had a mammogram within the past 2 years? N/A      5. For patients aged 21-65: Has the patient had a pap smear?  N/A    Health Maintenance Due   Topic Date Due    Annual Wellness Visit (AWV)  05/06/2023    Flu vaccine (1) Never done

## 2023-09-13 NOTE — PATIENT INSTRUCTIONS
Patient instructed in supportive measures- extra fluids, OTC antipyretics, OTC cough and cold medications. Patient instructed to f/u for any worsening symptoms or any concerns of shortness of breath. Advised patient to follow current CDC guidelines. Stay home for 5 days and isolate from others in your home that are healthy! End isolation after 5 full days if you are fever-free for 24 hours (without the use of fever-reducing medication) and your symptoms are improving. Wear a well-fitting mask for 10 full days any time you are around others inside your home or in public. Do not go to places where you are unable to wear a mask.

## 2023-09-13 NOTE — PROGRESS NOTES
Chief Complaint   Patient presents with    URI     Sore throat, cough worse at night  Loss of appetite   Started 9/10 evening          Kalin Vega is a 74 y.o. female who presents for cough, sore throat, congestion, decreased appetite/taste. Symptoms started 9/10/23. Tested positive here for cov19 with rapid test.     Past Medical History:   Diagnosis Date    Essential hypertension 11/2/2016    HLD (hyperlipidemia)     Seizure ([address])     Seizures (HCC)        Patient Active Problem List   Diagnosis    Anxiety    Essential (primary) hypertension    Seizure disorder ([address])    Primary open angle glaucoma of both eyes, severe stage    Primary open angle glaucoma    Age-related nuclear cataract of both eyes    Mixed hyperlipidemia       Meds:   Current Outpatient Medications   Medication Sig Dispense Refill    lisinopril (PRINIVIL;ZESTRIL) 10 MG tablet Take 1 tablet by mouth daily 90 tablet 1    atorvastatin (LIPITOR) 20 MG tablet Take 1 tablet by mouth daily 90 tablet 1    hydroCHLOROthiazide (HYDRODIURIL) 25 MG tablet Take 1 tablet by mouth daily 90 tablet 1    aspirin 81 MG EC tablet Take 1 tablet by mouth daily      hydrOXYzine HCl (ATARAX) 25 MG tablet Take 1 tablet by mouth 3 times daily as needed      latanoprost (XALATAN) 0.005 % ophthalmic solution INSTILL 1 DROP INTO EACH EYE AT BEDTIME      phenytoin (DILANTIN) 100 MG ER capsule 2 capsules nightly      timolol (TIMOPTIC) 0.5 % ophthalmic solution 1 drop 2 times daily       No current facility-administered medications for this visit.        Allergies:   No Known Allergies    Smoker:  Social History     Tobacco Use   Smoking Status Never    Passive exposure: Past   Smokeless Tobacco Never       ETOH:   Social History     Substance and Sexual Activity   Alcohol Use Yes       FH:   Family History   Problem Relation Age of Onset    Heart Disease Sister     Stroke Father        ROS:  Nose: Nasal congestion and rhinorrea  Neck:   No swelling, masses, stiffness,

## 2024-01-17 ENCOUNTER — OFFICE VISIT (OUTPATIENT)
Facility: CLINIC | Age: 74
End: 2024-01-17
Payer: MEDICARE

## 2024-01-17 VITALS
HEIGHT: 63 IN | RESPIRATION RATE: 14 BRPM | DIASTOLIC BLOOD PRESSURE: 51 MMHG | SYSTOLIC BLOOD PRESSURE: 126 MMHG | TEMPERATURE: 97.4 F | HEART RATE: 66 BPM | OXYGEN SATURATION: 97 % | BODY MASS INDEX: 22.68 KG/M2 | WEIGHT: 128 LBS

## 2024-01-17 DIAGNOSIS — M25.512 CHRONIC LEFT SHOULDER PAIN: Primary | ICD-10-CM

## 2024-01-17 DIAGNOSIS — G40.909 SEIZURE DISORDER (HCC): ICD-10-CM

## 2024-01-17 DIAGNOSIS — G89.29 CHRONIC LEFT SHOULDER PAIN: Primary | ICD-10-CM

## 2024-01-17 PROCEDURE — 3078F DIAST BP <80 MM HG: CPT | Performed by: FAMILY MEDICINE

## 2024-01-17 PROCEDURE — G8427 DOCREV CUR MEDS BY ELIG CLIN: HCPCS | Performed by: FAMILY MEDICINE

## 2024-01-17 PROCEDURE — 20610 DRAIN/INJ JOINT/BURSA W/O US: CPT | Performed by: FAMILY MEDICINE

## 2024-01-17 PROCEDURE — 1123F ACP DISCUSS/DSCN MKR DOCD: CPT | Performed by: FAMILY MEDICINE

## 2024-01-17 PROCEDURE — 1090F PRES/ABSN URINE INCON ASSESS: CPT | Performed by: FAMILY MEDICINE

## 2024-01-17 PROCEDURE — 3074F SYST BP LT 130 MM HG: CPT | Performed by: FAMILY MEDICINE

## 2024-01-17 PROCEDURE — G8484 FLU IMMUNIZE NO ADMIN: HCPCS | Performed by: FAMILY MEDICINE

## 2024-01-17 PROCEDURE — 3017F COLORECTAL CA SCREEN DOC REV: CPT | Performed by: FAMILY MEDICINE

## 2024-01-17 PROCEDURE — G8400 PT W/DXA NO RESULTS DOC: HCPCS | Performed by: FAMILY MEDICINE

## 2024-01-17 PROCEDURE — 99213 OFFICE O/P EST LOW 20 MIN: CPT | Performed by: FAMILY MEDICINE

## 2024-01-17 PROCEDURE — G8420 CALC BMI NORM PARAMETERS: HCPCS | Performed by: FAMILY MEDICINE

## 2024-01-17 PROCEDURE — 1036F TOBACCO NON-USER: CPT | Performed by: FAMILY MEDICINE

## 2024-01-17 RX ORDER — TRIAMCINOLONE ACETONIDE 40 MG/ML
40 INJECTION, SUSPENSION INTRA-ARTICULAR; INTRAMUSCULAR ONCE
Status: COMPLETED | OUTPATIENT
Start: 2024-01-17 | End: 2024-01-17

## 2024-01-17 RX ADMIN — TRIAMCINOLONE ACETONIDE 40 MG: 40 INJECTION, SUSPENSION INTRA-ARTICULAR; INTRAMUSCULAR at 12:33

## 2024-01-17 ASSESSMENT — ENCOUNTER SYMPTOMS
COUGH: 0
SHORTNESS OF BREATH: 0

## 2024-01-17 NOTE — PATIENT INSTRUCTIONS
material, such as surgical tubing or Thera-Band.  Put the band around a solid object at about waist level. (A bedpost will work well.) Each hand should hold an end of the band.  With your elbows at your sides and bent to 90 degrees, pull the band back. Your shoulder blades should move toward each other. Return to the starting position.  Repeat 8 to 12 times.  External rotator strengthening exercise    Start by tying a piece of elastic exercise material to a doorknob. You can use surgical tubing or Thera-Band. (You may also hold one end of the band in each hand.)  Stand or sit with your shoulder relaxed and your elbow bent 90 degrees. Your upper arm should rest comfortably against your side. Squeeze a rolled towel between your elbow and your body for comfort. This will help keep your arm at your side.  Hold one end of the elastic band with the hand of the painful arm.  Start with your forearm across your belly. Slowly rotate the forearm out away from your body. Keep your elbow and upper arm tucked against the towel roll or the side of your body until you begin to feel tightness in your shoulder. Slowly move your arm back to where you started.  Repeat 8 to 12 times.  Internal rotator strengthening exercise    Start by tying a piece of elastic exercise material to a doorknob. You can use surgical tubing or Thera-Band.  Stand or sit with your shoulder relaxed and your elbow bent 90 degrees. Your upper arm should rest comfortably against your side. Squeeze a rolled towel between your elbow and your body for comfort. This will help keep your arm at your side.  Hold one end of the elastic band in the hand of the painful arm.  Slowly rotate your forearm toward your body until it touches your belly. Slowly move it back to where you started.  Keep your elbow and upper arm firmly tucked against the towel roll or at your side.  Repeat 8 to 12 times.  Pendulum swing    If you have pain in your back, do not do this

## 2024-01-17 NOTE — PROGRESS NOTES
1. \"Have you been to the ER, urgent care clinic since your last visit?  Hospitalized since your last visit?\" NO    2. \"Have you seen or consulted any other health care providers outside of the [facility] since your last visit?\" no        Health Maintenance Due   Topic Date Due    Respiratory Syncytial Virus (RSV) Pregnant or age 60 yrs+ (1 - 1-dose 60+ series) Never done    COVID-19 Vaccine (2 - 2023-24 season) 09/01/2023    Annual Wellness Visit (Medicare)  11/27/2023    Lipids  01/12/2024

## 2024-01-17 NOTE — PROGRESS NOTES
[facility]  Chief Complaint   Patient presents with    Shoulder Pain     Left x1 month but getting worse        History of Present Illness:   Juliana Forbes is a 73 y.o. female       HPI:  Here for L shoulder pain x 1 month. No trauma, falls, no heavy lifting. Pain getting worse. Cannot lift arm on her own. Taking asa 325 mg + 81 mg. Requests shot.     Health Maintenance  Health Maintenance Due   Topic Date Due    Respiratory Syncytial Virus (RSV) Pregnant or age 60 yrs+ (1 - 1-dose 60+ series) Never done    COVID-19 Vaccine (2 - 2023-24 season) 09/01/2023    Annual Wellness Visit (Medicare)  11/27/2023    Lipids  01/12/2024       Past Medical, Family, and Social History:     Past Medical History:   Diagnosis Date    Essential hypertension 11/2/2016    HLD (hyperlipidemia)     Seizure (HCC)     Seizures (HCC)       History reviewed. No pertinent surgical history.    Current Outpatient Medications on File Prior to Visit   Medication Sig Dispense Refill    lisinopril (PRINIVIL;ZESTRIL) 10 MG tablet Take 1 tablet by mouth daily 90 tablet 1    atorvastatin (LIPITOR) 20 MG tablet Take 1 tablet by mouth daily 90 tablet 1    hydroCHLOROthiazide (HYDRODIURIL) 25 MG tablet Take 1 tablet by mouth daily 90 tablet 1    aspirin 81 MG EC tablet Take 1 tablet by mouth daily      hydrOXYzine HCl (ATARAX) 25 MG tablet Take 1 tablet by mouth 3 times daily as needed      latanoprost (XALATAN) 0.005 % ophthalmic solution INSTILL 1 DROP INTO EACH EYE AT BEDTIME      phenytoin (DILANTIN) 100 MG ER capsule 2 capsules nightly      timolol (TIMOPTIC) 0.5 % ophthalmic solution 1 drop 2 times daily       No current facility-administered medications on file prior to visit.       Patient Active Problem List   Diagnosis    Anxiety    Essential (primary) hypertension    Seizure disorder (HCC)    Primary open angle glaucoma of both eyes, severe stage    Primary open angle glaucoma    Age-related nuclear cataract of both

## 2024-02-11 DIAGNOSIS — I10 ESSENTIAL (PRIMARY) HYPERTENSION: ICD-10-CM

## 2024-02-12 RX ORDER — HYDROCHLOROTHIAZIDE 25 MG/1
25 TABLET ORAL DAILY
Qty: 90 TABLET | Refills: 0 | Status: SHIPPED | OUTPATIENT
Start: 2024-02-12 | End: 2024-02-14 | Stop reason: SDUPTHER

## 2024-02-14 ENCOUNTER — OFFICE VISIT (OUTPATIENT)
Facility: CLINIC | Age: 74
End: 2024-02-14
Payer: MEDICARE

## 2024-02-14 VITALS
OXYGEN SATURATION: 97 % | BODY MASS INDEX: 21.97 KG/M2 | WEIGHT: 124 LBS | HEART RATE: 66 BPM | RESPIRATION RATE: 14 BRPM | SYSTOLIC BLOOD PRESSURE: 117 MMHG | DIASTOLIC BLOOD PRESSURE: 63 MMHG | HEIGHT: 63 IN | TEMPERATURE: 98.3 F

## 2024-02-14 DIAGNOSIS — Z79.899 LONG TERM USE OF DRUG: ICD-10-CM

## 2024-02-14 DIAGNOSIS — E78.2 MIXED HYPERLIPIDEMIA: ICD-10-CM

## 2024-02-14 DIAGNOSIS — Z00.00 MEDICARE ANNUAL WELLNESS VISIT, SUBSEQUENT: ICD-10-CM

## 2024-02-14 DIAGNOSIS — D64.9 ANEMIA, UNSPECIFIED TYPE: ICD-10-CM

## 2024-02-14 DIAGNOSIS — I10 ESSENTIAL (PRIMARY) HYPERTENSION: Primary | ICD-10-CM

## 2024-02-14 PROCEDURE — 3017F COLORECTAL CA SCREEN DOC REV: CPT | Performed by: FAMILY MEDICINE

## 2024-02-14 PROCEDURE — 1036F TOBACCO NON-USER: CPT | Performed by: FAMILY MEDICINE

## 2024-02-14 PROCEDURE — 3078F DIAST BP <80 MM HG: CPT | Performed by: FAMILY MEDICINE

## 2024-02-14 PROCEDURE — 3074F SYST BP LT 130 MM HG: CPT | Performed by: FAMILY MEDICINE

## 2024-02-14 PROCEDURE — G0439 PPPS, SUBSEQ VISIT: HCPCS | Performed by: FAMILY MEDICINE

## 2024-02-14 PROCEDURE — G8427 DOCREV CUR MEDS BY ELIG CLIN: HCPCS | Performed by: FAMILY MEDICINE

## 2024-02-14 PROCEDURE — G8420 CALC BMI NORM PARAMETERS: HCPCS | Performed by: FAMILY MEDICINE

## 2024-02-14 PROCEDURE — 99214 OFFICE O/P EST MOD 30 MIN: CPT | Performed by: FAMILY MEDICINE

## 2024-02-14 PROCEDURE — G8484 FLU IMMUNIZE NO ADMIN: HCPCS | Performed by: FAMILY MEDICINE

## 2024-02-14 PROCEDURE — 1123F ACP DISCUSS/DSCN MKR DOCD: CPT | Performed by: FAMILY MEDICINE

## 2024-02-14 PROCEDURE — 1090F PRES/ABSN URINE INCON ASSESS: CPT | Performed by: FAMILY MEDICINE

## 2024-02-14 PROCEDURE — G8400 PT W/DXA NO RESULTS DOC: HCPCS | Performed by: FAMILY MEDICINE

## 2024-02-14 RX ORDER — ATORVASTATIN CALCIUM 20 MG/1
20 TABLET, FILM COATED ORAL DAILY
Qty: 90 TABLET | Refills: 1 | Status: SHIPPED | OUTPATIENT
Start: 2024-02-14

## 2024-02-14 RX ORDER — HYDROCHLOROTHIAZIDE 25 MG/1
25 TABLET ORAL DAILY
Qty: 90 TABLET | Refills: 1 | Status: SHIPPED | OUTPATIENT
Start: 2024-02-14

## 2024-02-14 RX ORDER — LISINOPRIL 10 MG/1
10 TABLET ORAL DAILY
Qty: 90 TABLET | Refills: 1 | Status: SHIPPED | OUTPATIENT
Start: 2024-02-14

## 2024-02-14 NOTE — PATIENT INSTRUCTIONS
recommended every 6 months.  Try to get at least 150 minutes of exercise per week or 10,000 steps per day on a pedometer .  Order or download the FREE \"Exercise & Physical Activity: Your Everyday Guide\" from [employer]. Call 1-630.737.5773 or search [employer] [employer] online.  You need 6196-7278 mg of calcium and 2246-6186 IU of vitamin D per day. It is possible to meet your calcium requirement with diet alone, but a vitamin D supplement is usually necessary to meet this goal.  When exposed to the sun, use a sunscreen that protects against both UVA and UVB radiation with an SPF of 30 or greater. Reapply every 2 to 3 hours or after sweating, drying off with a towel, or swimming.  Always wear a seat belt when traveling in a car. Always wear a helmet when riding a bicycle or motorcycle.

## 2024-02-14 NOTE — PROGRESS NOTES
1. \"Have you been to the ER, urgent care clinic since your last visit?  Hospitalized since your last visit?\" NO    2. \"Have you seen or consulted any other health care providers outside of the [facility] since your last visit?\" no        Health Maintenance Due   Topic Date Due    Respiratory Syncytial Virus (RSV) Pregnant or age 60 yrs+ (1 - 1-dose 60+ series) Never done    COVID-19 Vaccine (2 - 2023-24 season) 09/01/2023    Annual Wellness Visit (Medicare)  11/27/2023    Lipids  01/12/2024

## 2024-02-14 NOTE — PROGRESS NOTES
Medicare Annual Wellness Visit    Juliana Forbes is here for Medicare AWV    Assessment & Plan   Medicare annual wellness visit, subsequent  Essential (primary) hypertension  -     hydroCHLOROthiazide (HYDRODIURIL) 25 MG tablet; Take 1 tablet by mouth daily, Disp-90 tablet, R-1Normal  Seizure disorder (HCC)  Mixed hyperlipidemia  -     Lipid Panel; Future  -     atorvastatin (LIPITOR) 20 MG tablet; Take 1 tablet by mouth daily, Disp-90 tablet, R-1Normal  Long term use of drug  -     Comprehensive Metabolic Panel; Future  Anemia, unspecified type  -     CBC with Auto Differential; Future  She refused RSV.   Recommendations for Preventive Services Due: see orders and patient instructions/AVS.  Recommended screening schedule for the next 5-10 years is provided to the patient in written form: see Patient Instructions/AVS.     Return in about 6 months (around 8/14/2024) for htn, HLD.     Subjective       Patient's complete Health Risk Assessment and screening values have been reviewed and are found in Flowsheets. The following problems were reviewed today and where indicated follow up appointments were made and/or referrals ordered.    No Positive Risk Factors identified today.                                  Objective   Vitals:    02/14/24 1334 02/14/24 1357   BP: (!) 141/63 117/63   Site: Left Upper Arm    Position: Sitting    Cuff Size: Medium Adult    Pulse: 66    Resp: 14    Temp: 98.3 °F (36.8 °C)    TempSrc: Oral    SpO2: 97%    Weight: 56.2 kg (124 lb)    Height: 1.6 m (5' 3\")       Body mass index is 21.97 kg/m².             No Known Allergies  Prior to Visit Medications    Medication Sig Taking? Authorizing Provider   lisinopril (PRINIVIL;ZESTRIL) 10 MG tablet Take 1 tablet by mouth daily Yes Alva Oswald MD   hydroCHLOROthiazide (HYDRODIURIL) 25 MG tablet Take 1 tablet by mouth daily Yes Alva Oswald MD   atorvastatin (LIPITOR) 20 MG tablet Take 1 tablet by mouth daily Yes Alva Oswald MD   diclofenac

## 2024-02-14 NOTE — PROGRESS NOTES
[facility]  Chief Complaint   Patient presents with    Medicare AWV       History of Present Illness:   Juliana Forbes is a 73 y.o. female       HPI:  Here for f/u HTN and shoulder pain. Shoulder much better after injection last month. She is doing her exercises as directed.  Taking meds as directed. Due for labs.    Health Maintenance  Health Maintenance Due   Topic Date Due    COVID-19 Vaccine (2 - 2023-24 season) 09/01/2023    Lipids  01/12/2024       Past Medical, Family, and Social History:     Past Medical History:   Diagnosis Date    Essential hypertension 11/2/2016    HLD (hyperlipidemia)     Seizure (HCC)     Seizures (HCC)       History reviewed. No pertinent surgical history.    Current Outpatient Medications on File Prior to Visit   Medication Sig Dispense Refill    diclofenac sodium (VOLTAREN) 1 % GEL Apply 2 g topically 4 times daily 100 g 0    aspirin 81 MG EC tablet Take 1 tablet by mouth daily      hydrOXYzine HCl (ATARAX) 25 MG tablet Take 1 tablet by mouth 3 times daily as needed      latanoprost (XALATAN) 0.005 % ophthalmic solution INSTILL 1 DROP INTO EACH EYE AT BEDTIME      phenytoin (DILANTIN) 100 MG ER capsule 2 capsules nightly      timolol (TIMOPTIC) 0.5 % ophthalmic solution 1 drop 2 times daily       No current facility-administered medications on file prior to visit.       Patient Active Problem List   Diagnosis    Anxiety    Essential (primary) hypertension    Seizure disorder (HCC)    Primary open angle glaucoma of both eyes, severe stage    Primary open angle glaucoma    Age-related nuclear cataract of both eyes    Mixed hyperlipidemia       Social History     Socioeconomic History    Marital status:      Spouse name: None    Number of children: None    Years of education: None    Highest education level: None   Tobacco Use    Smoking status: Never     Passive exposure: Past    Smokeless tobacco: Never   Substance and Sexual Activity    Alcohol use: Yes

## 2024-02-14 NOTE — ASSESSMENT & PLAN NOTE
Unclear control, continue current medications  Current treatment plan is effective, no change in therapy , lab results and schedule of future lab studies reviewed with patient, reviewed diet, exercise and weight control, and reviewed medications and side effects in detail

## 2024-02-15 LAB
ALBUMIN SERPL-MCNC: 3.8 G/DL (ref 3.5–5)
ALBUMIN/GLOB SERPL: 1.2 (ref 1.1–2.2)
ALP SERPL-CCNC: 72 U/L (ref 45–117)
ALT SERPL-CCNC: 26 U/L (ref 12–78)
ANION GAP SERPL CALC-SCNC: 1 MMOL/L (ref 5–15)
AST SERPL-CCNC: 20 U/L (ref 15–37)
BASOPHILS # BLD: 0 K/UL (ref 0–0.1)
BASOPHILS NFR BLD: 1 % (ref 0–1)
BILIRUB SERPL-MCNC: 0.2 MG/DL (ref 0.2–1)
BUN SERPL-MCNC: 26 MG/DL (ref 6–20)
BUN/CREAT SERPL: 26 (ref 12–20)
CALCIUM SERPL-MCNC: 9.5 MG/DL (ref 8.5–10.1)
CHLORIDE SERPL-SCNC: 106 MMOL/L (ref 97–108)
CHOLEST SERPL-MCNC: 230 MG/DL
CO2 SERPL-SCNC: 31 MMOL/L (ref 21–32)
CREAT SERPL-MCNC: 1 MG/DL (ref 0.55–1.02)
DIFFERENTIAL METHOD BLD: ABNORMAL
EOSINOPHIL # BLD: 0.2 K/UL (ref 0–0.4)
EOSINOPHIL NFR BLD: 4 % (ref 0–7)
ERYTHROCYTE [DISTWIDTH] IN BLOOD BY AUTOMATED COUNT: 15.3 % (ref 11.5–14.5)
GLOBULIN SER CALC-MCNC: 3.3 G/DL (ref 2–4)
GLUCOSE SERPL-MCNC: 90 MG/DL (ref 65–100)
HCT VFR BLD AUTO: 35.2 % (ref 35–47)
HDLC SERPL-MCNC: 106 MG/DL
HDLC SERPL: 2.2 (ref 0–5)
HGB BLD-MCNC: 11.3 G/DL (ref 11.5–16)
IMM GRANULOCYTES # BLD AUTO: 0 K/UL (ref 0–0.04)
IMM GRANULOCYTES NFR BLD AUTO: 0 % (ref 0–0.5)
LDLC SERPL CALC-MCNC: 98 MG/DL (ref 0–100)
LYMPHOCYTES # BLD: 1 K/UL (ref 0.8–3.5)
LYMPHOCYTES NFR BLD: 24 % (ref 12–49)
MCH RBC QN AUTO: 30.3 PG (ref 26–34)
MCHC RBC AUTO-ENTMCNC: 32.1 G/DL (ref 30–36.5)
MCV RBC AUTO: 94.4 FL (ref 80–99)
MONOCYTES # BLD: 0.5 K/UL (ref 0–1)
MONOCYTES NFR BLD: 12 % (ref 5–13)
NEUTS SEG # BLD: 2.5 K/UL (ref 1.8–8)
NEUTS SEG NFR BLD: 59 % (ref 32–75)
NRBC # BLD: 0 K/UL (ref 0–0.01)
NRBC BLD-RTO: 0 PER 100 WBC
PLATELET # BLD AUTO: 252 K/UL (ref 150–400)
PMV BLD AUTO: 10 FL (ref 8.9–12.9)
POTASSIUM SERPL-SCNC: 4.5 MMOL/L (ref 3.5–5.1)
PROT SERPL-MCNC: 7.1 G/DL (ref 6.4–8.2)
RBC # BLD AUTO: 3.73 M/UL (ref 3.8–5.2)
SODIUM SERPL-SCNC: 138 MMOL/L (ref 136–145)
TRIGL SERPL-MCNC: 130 MG/DL
VLDLC SERPL CALC-MCNC: 26 MG/DL
WBC # BLD AUTO: 4.3 K/UL (ref 3.6–11)

## 2024-08-26 RX ORDER — LISINOPRIL 10 MG/1
10 TABLET ORAL DAILY
Qty: 90 TABLET | Refills: 0 | Status: SHIPPED | OUTPATIENT
Start: 2024-08-26

## 2024-09-05 ENCOUNTER — OFFICE VISIT (OUTPATIENT)
Facility: CLINIC | Age: 74
End: 2024-09-05

## 2024-09-05 VITALS
WEIGHT: 121 LBS | OXYGEN SATURATION: 100 % | BODY MASS INDEX: 21.44 KG/M2 | TEMPERATURE: 97.3 F | RESPIRATION RATE: 14 BRPM | DIASTOLIC BLOOD PRESSURE: 75 MMHG | HEART RATE: 61 BPM | SYSTOLIC BLOOD PRESSURE: 158 MMHG | HEIGHT: 63 IN

## 2024-09-05 DIAGNOSIS — Z79.899 LONG TERM USE OF DRUG: ICD-10-CM

## 2024-09-05 DIAGNOSIS — G40.909 SEIZURE DISORDER (HCC): ICD-10-CM

## 2024-09-05 DIAGNOSIS — E78.2 MIXED HYPERLIPIDEMIA: ICD-10-CM

## 2024-09-05 DIAGNOSIS — I10 ESSENTIAL (PRIMARY) HYPERTENSION: Primary | ICD-10-CM

## 2024-09-05 RX ORDER — LISINOPRIL 10 MG/1
10 TABLET ORAL DAILY
Qty: 90 TABLET | Refills: 1 | Status: SHIPPED | OUTPATIENT
Start: 2024-09-05

## 2024-09-05 RX ORDER — ATORVASTATIN CALCIUM 20 MG/1
20 TABLET, FILM COATED ORAL DAILY
Qty: 90 TABLET | Refills: 1 | Status: SHIPPED | OUTPATIENT
Start: 2024-09-05

## 2024-09-05 RX ORDER — HYDROCHLOROTHIAZIDE 25 MG/1
25 TABLET ORAL DAILY
Qty: 90 TABLET | Refills: 1 | Status: SHIPPED | OUTPATIENT
Start: 2024-09-05

## 2024-09-05 SDOH — ECONOMIC STABILITY: FOOD INSECURITY: WITHIN THE PAST 12 MONTHS, THE FOOD YOU BOUGHT JUST DIDN'T LAST AND YOU DIDN'T HAVE MONEY TO GET MORE.: NEVER TRUE

## 2024-09-05 SDOH — ECONOMIC STABILITY: FOOD INSECURITY: WITHIN THE PAST 12 MONTHS, YOU WORRIED THAT YOUR FOOD WOULD RUN OUT BEFORE YOU GOT MONEY TO BUY MORE.: NEVER TRUE

## 2024-09-05 SDOH — ECONOMIC STABILITY: INCOME INSECURITY: HOW HARD IS IT FOR YOU TO PAY FOR THE VERY BASICS LIKE FOOD, HOUSING, MEDICAL CARE, AND HEATING?: NOT HARD AT ALL

## 2024-09-05 ASSESSMENT — ENCOUNTER SYMPTOMS
COUGH: 0
SHORTNESS OF BREATH: 0

## 2024-09-05 NOTE — ASSESSMENT & PLAN NOTE
Monitored by specialist- no acute findings meriting change in the plan       lab results and schedule of future lab studies reviewed with patient, and reviewed medications and side effects in detail  Refused all vaccines today.

## 2024-09-05 NOTE — ASSESSMENT & PLAN NOTE
Chronic, not at goal (unstable), patient refuses higher dose of medication and refuses to come back for recheck    Orders:    lisinopril (PRINIVIL;ZESTRIL) 10 MG tablet; Take 1 tablet by mouth daily    hydroCHLOROthiazide (HYDRODIURIL) 25 MG tablet; Take 1 tablet by mouth daily

## 2024-09-05 NOTE — ASSESSMENT & PLAN NOTE
Chronic, at goal (stable), continue current treatment plan  Lab Results   Component Value Date    LDL 98 02/14/2024       Orders:    atorvastatin (LIPITOR) 20 MG tablet; Take 1 tablet by mouth daily

## 2024-09-05 NOTE — PROGRESS NOTES
No chief complaint on file.       \"Have you been to the ER, urgent care clinic since your last visit?  Hospitalized since your last visit?\"    NO    “Have you seen or consulted any other health care providers outside of [facility] since your last visit?”    [facility]             Click Here for Release of Records Request     Health Maintenance Due   Topic Date Due    DTaP/Tdap/Td vaccine (1 - Tdap) Never done    Shingles vaccine (1 of 2) Never done    DEXA (modify frequency per FRAX score)  Never done    Pneumococcal 65+ years Vaccine (1 of 1 - PCV) Never done    Flu vaccine (1) Never done    COVID-19 Vaccine (2 - 2023-24 season) 09/01/2024       
Panel; Future    Comprehensive Metabolic Panel    CBC with Auto Differential    Essential (primary) hypertension   Chronic, not at goal (unstable), patient refuses higher dose of medication and refuses to come back for recheck    Orders:    lisinopril (PRINIVIL;ZESTRIL) 10 MG tablet; Take 1 tablet by mouth daily    hydroCHLOROthiazide (HYDRODIURIL) 25 MG tablet; Take 1 tablet by mouth daily    Mixed hyperlipidemia   Chronic, at goal (stable), continue current treatment plan  Lab Results   Component Value Date    LDL 98 02/14/2024       Orders:    atorvastatin (LIPITOR) 20 MG tablet; Take 1 tablet by mouth daily    Seizure disorder (HCC)   Monitored by specialist- no acute findings meriting change in the plan       lab results and schedule of future lab studies reviewed with patient, and reviewed medications and side effects in detail  Refused all vaccines today.  Return in about 6 months (around 3/5/2025) for htn, wellness, 40 min.   I have discussed the diagnosis with the patient and the intended plan as seen in the above orders.  Social history, medical history, and labs were reviewed.  The patient has received an after-visit summary and questions were answered concerning future plans.  I have discussed medication side effects and warnings with the patient as well. Patient verbalized understanding and accepts plan & risks.        Alva Oswald MD  [facility]  09/05/24

## 2024-09-06 LAB
ALBUMIN SERPL-MCNC: 3.4 G/DL (ref 3.5–5)
ALBUMIN/GLOB SERPL: 0.9 (ref 1.1–2.2)
ALP SERPL-CCNC: 95 U/L (ref 45–117)
ALT SERPL-CCNC: 26 U/L (ref 12–78)
ANION GAP SERPL CALC-SCNC: 4 MMOL/L (ref 2–12)
AST SERPL-CCNC: 23 U/L (ref 15–37)
BASOPHILS # BLD: 0.1 K/UL (ref 0–0.1)
BASOPHILS NFR BLD: 1 % (ref 0–1)
BILIRUB SERPL-MCNC: 0.3 MG/DL (ref 0.2–1)
BUN SERPL-MCNC: 24 MG/DL (ref 6–20)
BUN/CREAT SERPL: 26 (ref 12–20)
CALCIUM SERPL-MCNC: 9.7 MG/DL (ref 8.5–10.1)
CHLORIDE SERPL-SCNC: 105 MMOL/L (ref 97–108)
CO2 SERPL-SCNC: 30 MMOL/L (ref 21–32)
CREAT SERPL-MCNC: 0.93 MG/DL (ref 0.55–1.02)
DIFFERENTIAL METHOD BLD: ABNORMAL
EOSINOPHIL # BLD: 0.2 K/UL (ref 0–0.4)
EOSINOPHIL NFR BLD: 4 % (ref 0–7)
ERYTHROCYTE [DISTWIDTH] IN BLOOD BY AUTOMATED COUNT: 15.6 % (ref 11.5–14.5)
GLOBULIN SER CALC-MCNC: 3.9 G/DL (ref 2–4)
GLUCOSE SERPL-MCNC: 91 MG/DL (ref 65–100)
HCT VFR BLD AUTO: 33.2 % (ref 35–47)
HGB BLD-MCNC: 10.3 G/DL (ref 11.5–16)
IMM GRANULOCYTES # BLD AUTO: 0 K/UL (ref 0–0.04)
IMM GRANULOCYTES NFR BLD AUTO: 0 % (ref 0–0.5)
LYMPHOCYTES # BLD: 1.2 K/UL (ref 0.8–3.5)
LYMPHOCYTES NFR BLD: 21 % (ref 12–49)
MCH RBC QN AUTO: 30.1 PG (ref 26–34)
MCHC RBC AUTO-ENTMCNC: 31 G/DL (ref 30–36.5)
MCV RBC AUTO: 97.1 FL (ref 80–99)
MONOCYTES # BLD: 0.6 K/UL (ref 0–1)
MONOCYTES NFR BLD: 12 % (ref 5–13)
NEUTS SEG # BLD: 3.3 K/UL (ref 1.8–8)
NEUTS SEG NFR BLD: 62 % (ref 32–75)
NRBC # BLD: 0 K/UL (ref 0–0.01)
NRBC BLD-RTO: 0 PER 100 WBC
PLATELET # BLD AUTO: 297 K/UL (ref 150–400)
PMV BLD AUTO: 10.3 FL (ref 8.9–12.9)
POTASSIUM SERPL-SCNC: 4.3 MMOL/L (ref 3.5–5.1)
PROT SERPL-MCNC: 7.3 G/DL (ref 6.4–8.2)
RBC # BLD AUTO: 3.42 M/UL (ref 3.8–5.2)
SODIUM SERPL-SCNC: 139 MMOL/L (ref 136–145)
WBC # BLD AUTO: 5.4 K/UL (ref 3.6–11)

## 2025-05-09 DIAGNOSIS — I10 ESSENTIAL (PRIMARY) HYPERTENSION: ICD-10-CM

## 2025-05-09 RX ORDER — HYDROCHLOROTHIAZIDE 25 MG/1
25 TABLET ORAL DAILY
Qty: 90 TABLET | Refills: 0 | Status: SHIPPED | OUTPATIENT
Start: 2025-05-09

## 2025-05-19 ENCOUNTER — OFFICE VISIT (OUTPATIENT)
Facility: CLINIC | Age: 75
End: 2025-05-19
Payer: MEDICARE

## 2025-05-19 VITALS
HEIGHT: 63 IN | HEART RATE: 58 BPM | TEMPERATURE: 99 F | DIASTOLIC BLOOD PRESSURE: 54 MMHG | RESPIRATION RATE: 16 BRPM | OXYGEN SATURATION: 99 % | BODY MASS INDEX: 20.38 KG/M2 | SYSTOLIC BLOOD PRESSURE: 136 MMHG | WEIGHT: 115 LBS

## 2025-05-19 DIAGNOSIS — I10 ESSENTIAL (PRIMARY) HYPERTENSION: ICD-10-CM

## 2025-05-19 DIAGNOSIS — R63.4 WEIGHT LOSS: ICD-10-CM

## 2025-05-19 DIAGNOSIS — Z00.00 MEDICARE ANNUAL WELLNESS VISIT, SUBSEQUENT: ICD-10-CM

## 2025-05-19 DIAGNOSIS — E78.2 MIXED HYPERLIPIDEMIA: ICD-10-CM

## 2025-05-19 DIAGNOSIS — Z79.899 LONG TERM USE OF DRUG: ICD-10-CM

## 2025-05-19 DIAGNOSIS — R07.9 CHEST PAIN, UNSPECIFIED TYPE: Primary | ICD-10-CM

## 2025-05-19 DIAGNOSIS — G40.909 SEIZURE DISORDER (HCC): ICD-10-CM

## 2025-05-19 PROCEDURE — 1090F PRES/ABSN URINE INCON ASSESS: CPT | Performed by: FAMILY MEDICINE

## 2025-05-19 PROCEDURE — 3078F DIAST BP <80 MM HG: CPT | Performed by: FAMILY MEDICINE

## 2025-05-19 PROCEDURE — 1036F TOBACCO NON-USER: CPT | Performed by: FAMILY MEDICINE

## 2025-05-19 PROCEDURE — 1123F ACP DISCUSS/DSCN MKR DOCD: CPT | Performed by: FAMILY MEDICINE

## 2025-05-19 PROCEDURE — 3075F SYST BP GE 130 - 139MM HG: CPT | Performed by: FAMILY MEDICINE

## 2025-05-19 PROCEDURE — 93000 ELECTROCARDIOGRAM COMPLETE: CPT | Performed by: FAMILY MEDICINE

## 2025-05-19 PROCEDURE — 1160F RVW MEDS BY RX/DR IN RCRD: CPT | Performed by: FAMILY MEDICINE

## 2025-05-19 PROCEDURE — G0439 PPPS, SUBSEQ VISIT: HCPCS | Performed by: FAMILY MEDICINE

## 2025-05-19 PROCEDURE — 99214 OFFICE O/P EST MOD 30 MIN: CPT | Performed by: FAMILY MEDICINE

## 2025-05-19 PROCEDURE — G2211 COMPLEX E/M VISIT ADD ON: HCPCS | Performed by: FAMILY MEDICINE

## 2025-05-19 PROCEDURE — G8427 DOCREV CUR MEDS BY ELIG CLIN: HCPCS | Performed by: FAMILY MEDICINE

## 2025-05-19 PROCEDURE — G8420 CALC BMI NORM PARAMETERS: HCPCS | Performed by: FAMILY MEDICINE

## 2025-05-19 PROCEDURE — 1159F MED LIST DOCD IN RCRD: CPT | Performed by: FAMILY MEDICINE

## 2025-05-19 PROCEDURE — G8400 PT W/DXA NO RESULTS DOC: HCPCS | Performed by: FAMILY MEDICINE

## 2025-05-19 RX ORDER — ATORVASTATIN CALCIUM 20 MG/1
20 TABLET, FILM COATED ORAL DAILY
Qty: 90 TABLET | Refills: 1 | Status: SHIPPED | OUTPATIENT
Start: 2025-05-19

## 2025-05-19 RX ORDER — LISINOPRIL 10 MG/1
10 TABLET ORAL DAILY
Qty: 90 TABLET | Refills: 1 | Status: SHIPPED | OUTPATIENT
Start: 2025-05-19

## 2025-05-19 RX ORDER — HYDROCHLOROTHIAZIDE 25 MG/1
25 TABLET ORAL DAILY
Qty: 90 TABLET | Refills: 1 | Status: SHIPPED | OUTPATIENT
Start: 2025-05-19

## 2025-05-19 SDOH — ECONOMIC STABILITY: FOOD INSECURITY: WITHIN THE PAST 12 MONTHS, YOU WORRIED THAT YOUR FOOD WOULD RUN OUT BEFORE YOU GOT MONEY TO BUY MORE.: NEVER TRUE

## 2025-05-19 SDOH — ECONOMIC STABILITY: FOOD INSECURITY: WITHIN THE PAST 12 MONTHS, THE FOOD YOU BOUGHT JUST DIDN'T LAST AND YOU DIDN'T HAVE MONEY TO GET MORE.: NEVER TRUE

## 2025-05-19 ASSESSMENT — PATIENT HEALTH QUESTIONNAIRE - PHQ9
6. FEELING BAD ABOUT YOURSELF - OR THAT YOU ARE A FAILURE OR HAVE LET YOURSELF OR YOUR FAMILY DOWN: NOT AT ALL
3. TROUBLE FALLING OR STAYING ASLEEP: SEVERAL DAYS
9. THOUGHTS THAT YOU WOULD BE BETTER OFF DEAD, OR OF HURTING YOURSELF: NOT AT ALL
2. FEELING DOWN, DEPRESSED OR HOPELESS: NOT AT ALL
SUM OF ALL RESPONSES TO PHQ QUESTIONS 1-9: 1
7. TROUBLE CONCENTRATING ON THINGS, SUCH AS READING THE NEWSPAPER OR WATCHING TELEVISION: NOT AT ALL
SUM OF ALL RESPONSES TO PHQ QUESTIONS 1-9: 1
4. FEELING TIRED OR HAVING LITTLE ENERGY: NOT AT ALL
SUM OF ALL RESPONSES TO PHQ QUESTIONS 1-9: 1
1. LITTLE INTEREST OR PLEASURE IN DOING THINGS: NOT AT ALL
10. IF YOU CHECKED OFF ANY PROBLEMS, HOW DIFFICULT HAVE THESE PROBLEMS MADE IT FOR YOU TO DO YOUR WORK, TAKE CARE OF THINGS AT HOME, OR GET ALONG WITH OTHER PEOPLE: NOT DIFFICULT AT ALL
SUM OF ALL RESPONSES TO PHQ QUESTIONS 1-9: 1
8. MOVING OR SPEAKING SO SLOWLY THAT OTHER PEOPLE COULD HAVE NOTICED. OR THE OPPOSITE, BEING SO FIGETY OR RESTLESS THAT YOU HAVE BEEN MOVING AROUND A LOT MORE THAN USUAL: NOT AT ALL
5. POOR APPETITE OR OVEREATING: NOT AT ALL

## 2025-05-19 ASSESSMENT — LIFESTYLE VARIABLES
HOW MANY STANDARD DRINKS CONTAINING ALCOHOL DO YOU HAVE ON A TYPICAL DAY: 1 OR 2
HOW OFTEN DO YOU HAVE A DRINK CONTAINING ALCOHOL: 2-4 TIMES A MONTH

## 2025-05-19 ASSESSMENT — ENCOUNTER SYMPTOMS
COUGH: 0
SHORTNESS OF BREATH: 0

## 2025-05-19 NOTE — ASSESSMENT & PLAN NOTE
Chronic, at goal (stable), continue current treatment plan    Orders:    hydroCHLOROthiazide (HYDRODIURIL) 25 MG tablet; Take 1 tablet by mouth daily    lisinopril (PRINIVIL;ZESTRIL) 10 MG tablet; Take 1 tablet by mouth daily

## 2025-05-19 NOTE — ASSESSMENT & PLAN NOTE
Chronic, at goal (stable), continue current treatment plan  Lab Results   Component Value Date    LDL 98 02/14/2024       Orders:    Lipid Panel; Future    atorvastatin (LIPITOR) 20 MG tablet; Take 1 tablet by mouth daily

## 2025-05-19 NOTE — PATIENT INSTRUCTIONS
Learning About Being Active as an Older Adult  Why is being active important as you get older?     Being active is one of the best things you can do for your health. And it's never too late to start. Being active--or getting active, if you aren't already--has definite benefits. It can:  Give you more energy,  Keep your mind sharp.  Improve balance to reduce your risk of falls.  Help you manage chronic illness with fewer medicines.  No matter how old you are, how fit you are, or what health problems you have, there is a form of activity that will work for you. And the more physical activity you can do, the better your overall health will be.  What kinds of activity can help you stay healthy?  Being more active will make your daily activities easier. Physical activity includes planned exercise and things you do in daily life. There are four types of activity:  Aerobic.  Doing aerobic activity makes your heart and lungs strong.  Includes walking, dancing, and gardening.  Aim for at least 2½ hours spread throughout the week.  It improves your energy and can help you sleep better.  Muscle-strengthening.  This type of activity can help maintain muscle and strengthen bones.  Includes climbing stairs, using resistance bands, and lifting or carrying heavy loads.  Aim for at least twice a week.  It can help protect the knees and other joints.  Stretching.  Stretching gives you better range of motion in joints and muscles.  Includes upper arm stretches, calf stretches, and gentle yoga.  Aim for at least twice a week, preferably after your muscles are warmed up from other activities.  It can help you function better in daily life.  Balancing.  This helps you stay coordinated and have good posture.  Includes heel-to-toe walking, lorena chi, and certain types of yoga.  Aim for at least 3 days a week.  It can reduce your risk of falling.  Even if you have a hard time meeting the recommendations, it's better to be more active

## 2025-05-19 NOTE — PROGRESS NOTES
\"Have you been to the ER, urgent care clinic since your last visit?  Hospitalized since your last visit?\"    NO    “Have you seen or consulted any other health care providers outside of [facility] since your last visit?”    NO            Click Here for Release of Records Request     Health Maintenance Due   Topic Date Due    Respiratory Syncytial Virus (RSV) Pregnant or age 60 yrs+ (1 - 1-dose 75+ series) Never done    Lipids  02/14/2025    Depression Screen  02/14/2025    Annual Wellness Visit (Medicare)  02/14/2025     
name: None    Number of children: None    Years of education: None    Highest education level: None   Tobacco Use    Smoking status: Never     Passive exposure: Past    Smokeless tobacco: Never   Substance and Sexual Activity    Alcohol use: Yes    Drug use: No    Sexual activity: Not Currently     Partners: Male     Social Drivers of Health     Financial Resource Strain: Low Risk  (9/5/2024)    Overall Financial Resource Strain (CARDIA)     Difficulty of Paying Living Expenses: Not hard at all   Food Insecurity: No Food Insecurity (5/19/2025)    Hunger Vital Sign     Worried About Running Out of Food in the Last Year: Never true     Ran Out of Food in the Last Year: Never true   Transportation Needs: No Transportation Needs (5/19/2025)    PRAPARE - Transportation     Lack of Transportation (Medical): No     Lack of Transportation (Non-Medical): No   Physical Activity: Inactive (5/19/2025)    Exercise Vital Sign     Days of Exercise per Week: 0 days     Minutes of Exercise per Session: 0 min   Housing Stability: Low Risk  (5/19/2025)    Housing Stability Vital Sign     Unable to Pay for Housing in the Last Year: No     Number of Times Moved in the Last Year: 0     Homeless in the Last Year: No        Review of Systems   Review of Systems   Constitutional:  Positive for unexpected weight change.   Respiratory:  Negative for cough and shortness of breath.    Cardiovascular:  Negative for chest pain.        No chest pain now   She has lost 6 lbs since June      Objective:   BP (!) 136/54   Pulse 58   Temp 99 °F (37.2 °C) (Oral)   Resp 16   Ht 1.6 m (5' 3\")   Wt 52.2 kg (115 lb)   SpO2 99%   BMI 20.37 kg/m²    Physical Exam   PHYSICAL EXAM:  Gen: Pt sitting in chair, in NAD  Head: Normocephalic, atraumatic  Eyes: Sclera anicteric, EOM grossly intact,  Neck: no LAD, no thyromegaly  CVS: bradycardic, Normal S1, S2, no m/r/g  Resp: CTAB, no wheezes or rales or rhonchi  Abd: Soft, non-tender, non-distended, 
ophthalmic solution 1 drop 2 times daily Yes Automatic Reconciliation, Ar       CareTeam (Including outside providers/suppliers regularly involved in providing care):   Patient Care Team:  Alva Oswald MD as PCP - General (Family Medicine)  Alva Oswald MD as PCP - Empaneled Provider     Recommendations for Preventive Services Due: see orders and patient instructions/AVS.  Recommended screening schedule for the next 5-10 years is provided to the patient in written form: see Patient Instructions/AVS.     Reviewed and updated this visit:  Tobacco  Allergies  Sexual Hx

## 2025-05-20 ENCOUNTER — RESULTS FOLLOW-UP (OUTPATIENT)
Facility: CLINIC | Age: 75
End: 2025-05-20

## 2025-05-20 LAB
ALBUMIN SERPL-MCNC: 3.9 G/DL (ref 3.5–5)
ALBUMIN/GLOB SERPL: 1.1 (ref 1.1–2.2)
ALP SERPL-CCNC: 84 U/L (ref 45–117)
ALT SERPL-CCNC: 25 U/L (ref 12–78)
ANION GAP SERPL CALC-SCNC: 6 MMOL/L (ref 2–12)
AST SERPL-CCNC: 22 U/L (ref 15–37)
BASOPHILS # BLD: 0.04 K/UL (ref 0–0.1)
BASOPHILS NFR BLD: 0.9 % (ref 0–1)
BILIRUB SERPL-MCNC: 0.4 MG/DL (ref 0.2–1)
BUN SERPL-MCNC: 22 MG/DL (ref 6–20)
BUN/CREAT SERPL: 24 (ref 12–20)
CALCIUM SERPL-MCNC: 9.6 MG/DL (ref 8.5–10.1)
CHLORIDE SERPL-SCNC: 104 MMOL/L (ref 97–108)
CHOLEST SERPL-MCNC: 212 MG/DL
CO2 SERPL-SCNC: 31 MMOL/L (ref 21–32)
CREAT SERPL-MCNC: 0.93 MG/DL (ref 0.55–1.02)
DIFFERENTIAL METHOD BLD: ABNORMAL
EOSINOPHIL # BLD: 0.16 K/UL (ref 0–0.4)
EOSINOPHIL NFR BLD: 3.8 % (ref 0–7)
ERYTHROCYTE [DISTWIDTH] IN BLOOD BY AUTOMATED COUNT: 15.5 % (ref 11.5–14.5)
GLOBULIN SER CALC-MCNC: 3.4 G/DL (ref 2–4)
GLUCOSE SERPL-MCNC: 93 MG/DL (ref 65–100)
HCT VFR BLD AUTO: 34.1 % (ref 35–47)
HDLC SERPL-MCNC: 129 MG/DL
HDLC SERPL: 1.6 (ref 0–5)
HGB BLD-MCNC: 10.6 G/DL (ref 11.5–16)
IMM GRANULOCYTES # BLD AUTO: 0.01 K/UL (ref 0–0.04)
IMM GRANULOCYTES NFR BLD AUTO: 0.2 % (ref 0–0.5)
LDLC SERPL CALC-MCNC: 71.4 MG/DL (ref 0–100)
LYMPHOCYTES # BLD: 0.92 K/UL (ref 0.8–3.5)
LYMPHOCYTES NFR BLD: 21.6 % (ref 12–49)
MCH RBC QN AUTO: 30 PG (ref 26–34)
MCHC RBC AUTO-ENTMCNC: 31.1 G/DL (ref 30–36.5)
MCV RBC AUTO: 96.6 FL (ref 80–99)
MONOCYTES # BLD: 0.48 K/UL (ref 0–1)
MONOCYTES NFR BLD: 11.3 % (ref 5–13)
NEUTS SEG # BLD: 2.64 K/UL (ref 1.8–8)
NEUTS SEG NFR BLD: 62.2 % (ref 32–75)
NRBC # BLD: 0 K/UL (ref 0–0.01)
NRBC BLD-RTO: 0 PER 100 WBC
PLATELET # BLD AUTO: 242 K/UL (ref 150–400)
PMV BLD AUTO: 10.7 FL (ref 8.9–12.9)
POTASSIUM SERPL-SCNC: 4.1 MMOL/L (ref 3.5–5.1)
PROT SERPL-MCNC: 7.3 G/DL (ref 6.4–8.2)
RBC # BLD AUTO: 3.53 M/UL (ref 3.8–5.2)
SODIUM SERPL-SCNC: 141 MMOL/L (ref 136–145)
T4 FREE SERPL-MCNC: 1.1 NG/DL (ref 0.8–1.5)
TRIGL SERPL-MCNC: 58 MG/DL
TSH SERPL DL<=0.05 MIU/L-ACNC: 0.88 UIU/ML (ref 0.36–3.74)
VLDLC SERPL CALC-MCNC: 11.6 MG/DL
WBC # BLD AUTO: 4.3 K/UL (ref 3.6–11)

## 2025-08-20 ENCOUNTER — TELEPHONE (OUTPATIENT)
Facility: CLINIC | Age: 75
End: 2025-08-20

## 2025-08-20 ENCOUNTER — OFFICE VISIT (OUTPATIENT)
Facility: CLINIC | Age: 75
End: 2025-08-20

## 2025-08-20 VITALS
SYSTOLIC BLOOD PRESSURE: 128 MMHG | WEIGHT: 114 LBS | BODY MASS INDEX: 20.2 KG/M2 | HEART RATE: 66 BPM | TEMPERATURE: 98.3 F | RESPIRATION RATE: 14 BRPM | DIASTOLIC BLOOD PRESSURE: 70 MMHG | HEIGHT: 63 IN | OXYGEN SATURATION: 99 %

## 2025-08-20 DIAGNOSIS — T63.461A WASP STING, ACCIDENTAL OR UNINTENTIONAL, INITIAL ENCOUNTER: Primary | ICD-10-CM

## 2025-08-20 RX ORDER — TRIAMCINOLONE ACETONIDE 1 MG/G
CREAM TOPICAL
Qty: 80 G | Refills: 0 | Status: SHIPPED | OUTPATIENT
Start: 2025-08-20